# Patient Record
Sex: FEMALE | Race: OTHER | HISPANIC OR LATINO | Employment: STUDENT | URBAN - METROPOLITAN AREA
[De-identification: names, ages, dates, MRNs, and addresses within clinical notes are randomized per-mention and may not be internally consistent; named-entity substitution may affect disease eponyms.]

---
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
## 2020-01-02 ENCOUNTER — TELEPHONE (OUTPATIENT)
Dept: PSYCHIATRY | Facility: CLINIC | Age: 20
End: 2020-01-02

## 2020-01-02 NOTE — TELEPHONE ENCOUNTER
Behavorial Health Outpatient Intake Questions    Referred by: PCP    Please advised interviewee that they need to answer all questions truthfully to allow for best care and any misrepresentations of information may affect their ability to be seen at this clinic   => Was this discussed? Yes     Behavorial Health Outpatient Intake History -     Presenting Problem (in patient's words): Depression/anixety, med management    Has the patient ever seen or is currently seeing a psychiatrist? No   If yes who/when? If seen as outpatient, have they been seen here (and by whom)? If not seen here, which provider(s) did the patient see and for how long? Has the patient ever seen or currently see a therapist? Yes If yes who/when? At school  Has a member of the patient's family been in therapy here? No  If yes, with whom? Has the patient been hospitalized for mental health? No   If yes, how long ago was last hospitalization and where was it? Substance Abuse:No concerns of substance abuse are reported  Does the patient have ICM or CTT? No    Is the patient taking injectable psychiatric medications? No    => If yes, patient cannot be seen here  Communications  Are there any developmental disabilities? No    Does the patient have hearing impairment? No       History-    Has the patient served in the [place] [address]? No    If yes, have you had combat services? No    Was the patient activated into federal active duty as a member of the [employer] or reserve? No    Legal History-     Does the patient have any history of arrests, FPC/half-way time, or DUIs? No  If Yes-  1) What types of charges? 2) When were they last incarcerated? 3) Are they currently on parole or probation? Minor Child-    Who has custody of the child? Is there a custody agreement? If there is a custody agreement remind parent that they must bring a copy to the first appt or they will not be seen       Intake Team, please check with provider before scheduling if flags come up such as:  - complex case  - legal history (other than DUI)  - communication barrier concerns are present  - if, in your judgment, this needs further review    ACCEPTED as a patient Yes  => Appointment Date: Tuesday, 2/18/20 @ 3pm w/ Tim Sit    Referred Elsewhere? No    Name of Insurance Co: [address] [place] [address] Yu Rong ID#: [id number]  Insurance Phone #  If ins is primary or secondary  If patient is a minor, parents information such as Name, D  O B of guarantor

## 2020-01-07 ENCOUNTER — TELEPHONE (OUTPATIENT)
Dept: OBGYN CLINIC | Facility: CLINIC | Age: 20
End: 2020-01-07

## 2020-01-07 ENCOUNTER — OFFICE VISIT (OUTPATIENT)
Dept: OBGYN CLINIC | Facility: CLINIC | Age: 20
End: 2020-01-07
Payer: COMMERCIAL

## 2020-01-07 ENCOUNTER — APPOINTMENT (OUTPATIENT)
Dept: LAB | Facility: HOSPITAL | Age: 20
End: 2020-01-07
Payer: COMMERCIAL

## 2020-01-07 VITALS
DIASTOLIC BLOOD PRESSURE: 80 MMHG | SYSTOLIC BLOOD PRESSURE: 126 MMHG | BODY MASS INDEX: 51.91 KG/M2 | HEIGHT: 63 IN | WEIGHT: 293 LBS

## 2020-01-07 DIAGNOSIS — D50.0 IRON DEFICIENCY ANEMIA DUE TO CHRONIC BLOOD LOSS: ICD-10-CM

## 2020-01-07 DIAGNOSIS — N93.9 ABNORMAL UTERINE BLEEDING: Primary | ICD-10-CM

## 2020-01-07 DIAGNOSIS — N88.2 CERVICAL STENOSIS (UTERINE CERVIX): ICD-10-CM

## 2020-01-07 LAB
BASOPHILS # BLD AUTO: 0.02 THOUSANDS/ΜL (ref 0–0.1)
BASOPHILS NFR BLD AUTO: 0 % (ref 0–1)
EOSINOPHIL # BLD AUTO: 0.16 THOUSAND/ΜL (ref 0–0.61)
EOSINOPHIL NFR BLD AUTO: 2 % (ref 0–6)
ERYTHROCYTE [DISTWIDTH] IN BLOOD BY AUTOMATED COUNT: 17.7 % (ref 11.6–15.1)
HCT VFR BLD AUTO: 32.6 % (ref 34.8–46.1)
HGB BLD-MCNC: 10.2 G/DL (ref 11.5–15.4)
IMM GRANULOCYTES # BLD AUTO: 0.04 THOUSAND/UL (ref 0–0.2)
IMM GRANULOCYTES NFR BLD AUTO: 1 % (ref 0–2)
LYMPHOCYTES # BLD AUTO: 3.12 THOUSANDS/ΜL (ref 0.6–4.47)
LYMPHOCYTES NFR BLD AUTO: 37 % (ref 14–44)
MCH RBC QN AUTO: 25.8 PG (ref 26.8–34.3)
MCHC RBC AUTO-ENTMCNC: 31.3 G/DL (ref 31.4–37.4)
MCV RBC AUTO: 82 FL (ref 82–98)
MONOCYTES # BLD AUTO: 0.37 THOUSAND/ΜL (ref 0.17–1.22)
MONOCYTES NFR BLD AUTO: 4 % (ref 4–12)
NEUTROPHILS # BLD AUTO: 4.64 THOUSANDS/ΜL (ref 1.85–7.62)
NEUTS SEG NFR BLD AUTO: 56 % (ref 43–75)
NRBC BLD AUTO-RTO: 0 /100 WBCS
PLATELET # BLD AUTO: 293 THOUSANDS/UL (ref 149–390)
PMV BLD AUTO: 12 FL (ref 8.9–12.7)
RBC # BLD AUTO: 3.96 MILLION/UL (ref 3.81–5.12)
WBC # BLD AUTO: 8.35 THOUSAND/UL (ref 4.31–10.16)

## 2020-01-07 PROCEDURE — 85025 COMPLETE CBC W/AUTO DIFF WBC: CPT | Performed by: NURSE PRACTITIONER

## 2020-01-07 PROCEDURE — 99213 OFFICE O/P EST LOW 20 MIN: CPT | Performed by: NURSE PRACTITIONER

## 2020-01-07 PROCEDURE — 36415 COLL VENOUS BLD VENIPUNCTURE: CPT | Performed by: NURSE PRACTITIONER

## 2020-01-07 RX ORDER — MISOPROSTOL 200 UG/1
600 TABLET ORAL ONCE
Qty: 3 TABLET | Refills: 0 | Status: SHIPPED | OUTPATIENT
Start: 2020-01-07 | End: 2020-02-04 | Stop reason: ALTCHOICE

## 2020-01-07 RX ORDER — CHOLECALCIFEROL (VITAMIN D3) 50 MCG
4000 TABLET ORAL DAILY
COMMUNITY
Start: 2019-12-11 | End: 2020-02-18 | Stop reason: SDUPTHER

## 2020-01-07 NOTE — PROGRESS NOTES
Marcy Avitia 23year-old here for follow-up of her irregular bleeding  She was last seen in August 2019 at which time she was started on a progestin only pill  She was to follow up in 2 months and did not return  She has history of anemia due to blood loss  Last CBC was on 11/5/19 which was 12 5  She has been receiving iron transfusions, last infusion was in October  She discontinued the pill in September, states it was making her bleeding worse  Once she stopped the pill the bleeding stopped 2-3 days after  She started bleeding again on November 30th and has been ongoing since then  Heavy daily, changing every 2 hours  States for the past 4 days the bleeding has lightened, she is changing every 3 hours  She does complain of SOB  Denies dizziness  Patient has not been compliant with plan of care or follow-up visits  ROS:  As indicated in HPI  All other ROS negative  Physical Exam   Constitutional: She is oriented to person, place, and time  Vital signs are normal  She appears well-developed and well-nourished  HENT:   Head: Normocephalic and atraumatic  Neck: Neck supple  Cardiovascular: Normal rate and regular rhythm  Pulmonary/Chest: Effort normal and breath sounds normal    Neurological: She is alert and oriented to person, place, and time  Skin: Skin is warm  Nursing note and vitals reviewed  Iraida Villasenor was seen today for follow-up and menstrual problem  Diagnoses and all orders for this visit:    Abnormal uterine bleeding    Iron deficiency anemia due to chronic blood loss  -     CBC and differential    Cervical stenosis (uterine cervix)  -     misoprostol (CYTOTEC) 200 mcg tablet; Take 3 tablets (600 mcg total) by mouth once for 1 dose Take night before procedure      I once again reviewed options with patient for control of her menorrhagia to include Depo-provera or Mirena IUD  She has opted to try the Mirena IUD  Benefits and risk reviewed  Procedure reviewed   She will need Cytotec for cervical ripening  Cytotec prescribed for cervical ripening, to be take the night before insertion  She was also instructed to take 600 mg of Ibuprofen the night before and morning of insertion  As well as to ensure that she eats prior to her procedure  Will obtain a repeat CBC  She will return on Thursday for IUD insertion

## 2020-01-09 ENCOUNTER — PROCEDURE VISIT (OUTPATIENT)
Dept: OBGYN CLINIC | Facility: CLINIC | Age: 20
End: 2020-01-09
Payer: COMMERCIAL

## 2020-01-09 VITALS
WEIGHT: 293 LBS | HEIGHT: 63 IN | SYSTOLIC BLOOD PRESSURE: 126 MMHG | BODY MASS INDEX: 51.91 KG/M2 | DIASTOLIC BLOOD PRESSURE: 82 MMHG

## 2020-01-09 DIAGNOSIS — Z30.430 ENCOUNTER FOR INSERTION OF MIRENA IUD: Primary | ICD-10-CM

## 2020-01-09 DIAGNOSIS — N93.9 ABNORMAL UTERINE BLEEDING: ICD-10-CM

## 2020-01-09 PROCEDURE — 3008F BODY MASS INDEX DOCD: CPT | Performed by: OBSTETRICS & GYNECOLOGY

## 2020-01-09 PROCEDURE — 58300 INSERT INTRAUTERINE DEVICE: CPT | Performed by: NURSE PRACTITIONER

## 2020-01-09 NOTE — PROGRESS NOTES
Katelin Ocampo 23year-old here today for Mirena IUD insertion for abnormal uterine bleeding  Risk and benefits reviewed  Iud insertions  Date/Time: 1/9/2020 1:48 PM  Performed by: KELLY Lombardo  Authorized by: KELLY Lombardo     Consent:     Consent obtained:  Verbal and written    Consent given by:  Patient    Procedure risks and benefits discussed: yes      Patient questions answered: yes      Patient agrees, verbalizes understanding, and wants to proceed: yes      Educational handouts given: yes      Instructions and paperwork completed: yes    Procedure:     Pelvic exam performed: yes      Negative GC/chlamydia test: yes      Negative urine pregnancy test: currently on menses  Cervix cleaned and prepped: yes      Speculum placed in vagina: yes      Tenaculum applied to cervix: yes      Uterus sounded: yes      Uterus sound depth (cm):  7    IUD inserted with no complications: yes      IUD type:  Mirena    Strings trimmed: yes    Post-procedure:     Patient tolerated procedure well: yes      Patient will follow up after next period: yes    Comments:      IUD inserted without difficulty  Transabdominal US shows proper placement and no evidence of perforation  Patient given instruction booklet  She is to return in 5 weeks for follow up  Brenden Lopez was seen today for procedure      Diagnoses and all orders for this visit:    Encounter for insertion of mirena IUD  -     levonorgestrel (MIRENA) IUD 20 mcg/day  -     Iud insertions    Abnormal uterine bleeding  -     Iud insertions

## 2020-01-20 ENCOUNTER — TELEPHONE (OUTPATIENT)
Dept: HEMATOLOGY ONCOLOGY | Facility: CLINIC | Age: 20
End: 2020-01-20

## 2020-01-20 DIAGNOSIS — D50.0 IRON DEFICIENCY ANEMIA DUE TO CHRONIC BLOOD LOSS: Primary | ICD-10-CM

## 2020-01-20 NOTE — TELEPHONE ENCOUNTER
JONGM informing patient that her iron panel lab test needs to be completed prior to her appt  Instructed patient call the office if she has any questions

## 2020-01-21 ENCOUNTER — TELEPHONE (OUTPATIENT)
Dept: HEMATOLOGY ONCOLOGY | Facility: CLINIC | Age: 20
End: 2020-01-21

## 2020-01-27 ENCOUNTER — TELEPHONE (OUTPATIENT)
Dept: ENDOCRINOLOGY | Facility: CLINIC | Age: 20
End: 2020-01-27

## 2020-01-28 NOTE — TELEPHONE ENCOUNTER
Received fax from [phone] with referral for patients upcoming visit  Referral # [id number]  Referral dates: 2-6-2020 to 5-6-2020  Good for 3 visits    Scanned/attached to chart

## 2020-01-31 ENCOUNTER — TELEPHONE (OUTPATIENT)
Dept: HEMATOLOGY ONCOLOGY | Facility: CLINIC | Age: 20
End: 2020-01-31

## 2020-01-31 NOTE — TELEPHONE ENCOUNTER
Spoke to patient and informed her that her labs need to be completed prior to her appt  Patient voiced understanding

## 2020-02-02 ENCOUNTER — APPOINTMENT (OUTPATIENT)
Dept: LAB | Facility: HOSPITAL | Age: 20
End: 2020-02-02
Payer: COMMERCIAL

## 2020-02-02 DIAGNOSIS — R79.89 ELEVATED LFTS: ICD-10-CM

## 2020-02-02 DIAGNOSIS — E78.1 HYPERTRIGLYCERIDEMIA: ICD-10-CM

## 2020-02-02 DIAGNOSIS — E55.9 VITAMIN D DEFICIENCY: ICD-10-CM

## 2020-02-02 DIAGNOSIS — D50.0 IRON DEFICIENCY ANEMIA DUE TO CHRONIC BLOOD LOSS: ICD-10-CM

## 2020-02-02 DIAGNOSIS — R73.03 PRE-DIABETES: ICD-10-CM

## 2020-02-02 LAB
25(OH)D3 SERPL-MCNC: 26.5 NG/ML (ref 30–100)
ALBUMIN SERPL BCP-MCNC: 3.7 G/DL (ref 3.5–5)
ALP SERPL-CCNC: 84 U/L (ref 46–116)
ALT SERPL W P-5'-P-CCNC: 154 U/L (ref 12–78)
ANION GAP SERPL CALCULATED.3IONS-SCNC: 8 MMOL/L (ref 4–13)
AST SERPL W P-5'-P-CCNC: 101 U/L (ref 5–45)
BILIRUB SERPL-MCNC: 0.4 MG/DL (ref 0.2–1)
BUN SERPL-MCNC: 8 MG/DL (ref 5–25)
CALCIUM SERPL-MCNC: 9.1 MG/DL (ref 8.3–10.1)
CHLORIDE SERPL-SCNC: 109 MMOL/L (ref 100–108)
CHOLEST SERPL-MCNC: 174 MG/DL (ref 50–200)
CO2 SERPL-SCNC: 24 MMOL/L (ref 21–32)
CREAT SERPL-MCNC: 0.6 MG/DL (ref 0.6–1.3)
ERYTHROCYTE [DISTWIDTH] IN BLOOD BY AUTOMATED COUNT: 15.7 % (ref 11.6–15.1)
EST. AVERAGE GLUCOSE BLD GHB EST-MCNC: 148 MG/DL
FERRITIN SERPL-MCNC: 24 NG/ML (ref 8–388)
GFR SERPL CREATININE-BSD FRML MDRD: 132 ML/MIN/1.73SQ M
GLUCOSE P FAST SERPL-MCNC: 114 MG/DL (ref 65–99)
HAV IGM SER QL: NORMAL
HBA1C MFR BLD: 6.8 % (ref 4.2–6.3)
HBV CORE IGM SER QL: NORMAL
HBV SURFACE AG SER QL: NORMAL
HCT VFR BLD AUTO: 32.9 % (ref 34.8–46.1)
HCV AB SER QL: NORMAL
HDLC SERPL-MCNC: 28 MG/DL
HGB BLD-MCNC: 10 G/DL (ref 11.5–15.4)
IRON SATN MFR SERPL: 5 %
IRON SERPL-MCNC: 27 UG/DL (ref 50–170)
LDLC SERPL CALC-MCNC: 103 MG/DL (ref 0–100)
MCH RBC QN AUTO: 25.4 PG (ref 26.8–34.3)
MCHC RBC AUTO-ENTMCNC: 30.4 G/DL (ref 31.4–37.4)
MCV RBC AUTO: 84 FL (ref 82–98)
PLATELET # BLD AUTO: 349 THOUSANDS/UL (ref 149–390)
PMV BLD AUTO: 11.7 FL (ref 8.9–12.7)
POTASSIUM SERPL-SCNC: 3.7 MMOL/L (ref 3.5–5.3)
PROT SERPL-MCNC: 7.9 G/DL (ref 6.4–8.2)
RBC # BLD AUTO: 3.93 MILLION/UL (ref 3.81–5.12)
SODIUM SERPL-SCNC: 141 MMOL/L (ref 136–145)
TIBC SERPL-MCNC: 509 UG/DL (ref 250–450)
TRIGL SERPL-MCNC: 217 MG/DL
WBC # BLD AUTO: 7.71 THOUSAND/UL (ref 4.31–10.16)

## 2020-02-02 PROCEDURE — 3044F HG A1C LEVEL LT 7.0%: CPT | Performed by: OBSTETRICS & GYNECOLOGY

## 2020-02-02 PROCEDURE — 80074 ACUTE HEPATITIS PANEL: CPT

## 2020-02-02 PROCEDURE — 82306 VITAMIN D 25 HYDROXY: CPT

## 2020-02-02 PROCEDURE — 82728 ASSAY OF FERRITIN: CPT

## 2020-02-02 PROCEDURE — 80061 LIPID PANEL: CPT

## 2020-02-02 PROCEDURE — 83036 HEMOGLOBIN GLYCOSYLATED A1C: CPT

## 2020-02-02 PROCEDURE — 85027 COMPLETE CBC AUTOMATED: CPT

## 2020-02-02 PROCEDURE — 80053 COMPREHEN METABOLIC PANEL: CPT

## 2020-02-02 PROCEDURE — 83550 IRON BINDING TEST: CPT

## 2020-02-02 PROCEDURE — 36415 COLL VENOUS BLD VENIPUNCTURE: CPT

## 2020-02-02 PROCEDURE — 83540 ASSAY OF IRON: CPT

## 2020-02-03 ENCOUNTER — OFFICE VISIT (OUTPATIENT)
Dept: OBGYN CLINIC | Facility: CLINIC | Age: 20
End: 2020-02-03
Payer: COMMERCIAL

## 2020-02-03 ENCOUNTER — OFFICE VISIT (OUTPATIENT)
Dept: HEMATOLOGY ONCOLOGY | Facility: CLINIC | Age: 20
End: 2020-02-03
Payer: COMMERCIAL

## 2020-02-03 VITALS
DIASTOLIC BLOOD PRESSURE: 76 MMHG | WEIGHT: 293 LBS | BODY MASS INDEX: 51.91 KG/M2 | SYSTOLIC BLOOD PRESSURE: 124 MMHG | HEIGHT: 63 IN

## 2020-02-03 VITALS
HEIGHT: 63 IN | OXYGEN SATURATION: 99 % | SYSTOLIC BLOOD PRESSURE: 128 MMHG | RESPIRATION RATE: 16 BRPM | TEMPERATURE: 98.7 F | HEART RATE: 90 BPM | WEIGHT: 293 LBS | DIASTOLIC BLOOD PRESSURE: 72 MMHG | BODY MASS INDEX: 51.91 KG/M2

## 2020-02-03 DIAGNOSIS — D50.0 IRON DEFICIENCY ANEMIA DUE TO CHRONIC BLOOD LOSS: Primary | ICD-10-CM

## 2020-02-03 DIAGNOSIS — N93.9 ABNORMAL UTERINE BLEEDING: ICD-10-CM

## 2020-02-03 DIAGNOSIS — N92.1 MENORRHAGIA WITH IRREGULAR CYCLE: Primary | ICD-10-CM

## 2020-02-03 PROCEDURE — 1036F TOBACCO NON-USER: CPT | Performed by: NURSE PRACTITIONER

## 2020-02-03 PROCEDURE — 3008F BODY MASS INDEX DOCD: CPT | Performed by: NURSE PRACTITIONER

## 2020-02-03 PROCEDURE — 3008F BODY MASS INDEX DOCD: CPT | Performed by: PHYSICIAN ASSISTANT

## 2020-02-03 PROCEDURE — 1036F TOBACCO NON-USER: CPT | Performed by: PHYSICIAN ASSISTANT

## 2020-02-03 PROCEDURE — 99213 OFFICE O/P EST LOW 20 MIN: CPT | Performed by: NURSE PRACTITIONER

## 2020-02-03 PROCEDURE — 99213 OFFICE O/P EST LOW 20 MIN: CPT | Performed by: PHYSICIAN ASSISTANT

## 2020-02-03 RX ORDER — SODIUM CHLORIDE 9 MG/ML
20 INJECTION, SOLUTION INTRAVENOUS ONCE
Status: CANCELLED | OUTPATIENT
Start: 2020-02-13

## 2020-02-03 RX ORDER — MEDROXYPROGESTERONE ACETATE 10 MG/1
10 TABLET ORAL DAILY
Qty: 10 TABLET | Refills: 0 | Status: SHIPPED | OUTPATIENT
Start: 2020-02-03 | End: 2021-11-12 | Stop reason: ALTCHOICE

## 2020-02-03 NOTE — PROGRESS NOTES
Re Pierre 21year-old history of AUB  On 1/9/2020 a mirena IUD was inserted for bleeding control  She presents today with IUD in hand and states it fell out a few days ago  She is still bleeding, but states it is lighter than it has been  She has been bleeding since 11/30/19  She was seen by hematology today and will be having iron infusions starting next week  She denies SOB/Dizziness  We discussed replacement of the IUD after we stop the bleeding with provera  She will think about it  She was also offered to start on OCP or Depo-provera  She is working on loosing weight and is not interested in the Depo-provera and states the OCP does not help with her bleeding  We did speak about how weight loss will be beneficial in helping with her cycles  She will call the office when she decides which hormone she decides on  For now she will take the Provera daily for 10 days and update us on her bleeding  I spent approximately 20 minutes of face-to-face time with the  patient, greater than 50% of which was spent in counseling and the coordination of care for this patient  Maggie Hinkel was seen today for medication management  Diagnoses and all orders for this visit:    Menorrhagia with irregular cycle  -     medroxyPROGESTERone (PROVERA) 10 mg tablet; Take 1 tablet (10 mg total) by mouth daily for 10 days    Abnormal uterine bleeding  -     medroxyPROGESTERone (PROVERA) 10 mg tablet;  Take 1 tablet (10 mg total) by mouth daily for 10 days

## 2020-02-03 NOTE — LETTER
February 3, 2020     Jason Narvaez, [address] [place] [address]    Patient: Naya Blair   YOB: 2000   Date of Visit: 2/3/2020       Dear Dr Óscar Delcid: Thank you for referring Naya Blair to me for evaluation  Below are my notes for this consultation  If you have questions, please do not hesitate to call me  I look forward to following your patient along with you  Sincerely,        Sisto Siemens, PA-C        CC: No Recipients  Sisto Siemens, PA-C  2/3/2020 12:52 PM  Sign at close encounter  Hematology/Oncology Outpatient Follow-up  Naya Blair 21 y o  female 2000 [id number]    Date:  2/3/2020      Assessment and Plan:  1  Iron deficiency anemia due to chronic blood loss  55-year-old female presents for follow-up regarding iron-deficiency anemia related to chronic blood loss secondary to menorrhagia  She had an IUD placed by gyn recently and states that this fell out a few days ago  She is advised to call her gyn to discuss this  She states that she has continuous bleeding still  She is taking 2 oral iron tablets daily  Her hemoglobin and iron have decreased  She is now bleeding daily from menstrual period still  She would like to proceed with IV iron, Feraheme 510 IV weekly x 2  Follow up in 3 months  Call us if have progressive symptoms in the meantime  Advise to follow up with GYN asap due to IUD coming out  - Ambulatory referral to Gynecology; Future  - CBC and differential; Future  - Iron Panel (Includes Ferritin, Iron Sat%, Iron, and TIBC); Future    HPI:  55-year-old female presents for follow-up regarding iron deficiency related to menorrhagia  She did have workup for bleeding disorder which was negative  She received IV iron, Feraheme  Hemoglobin improved to 12 5  She has now been in observation  She continues to follow closely with GYN   She states she had IUD placed recently and this fell out a few days ago  Interval history:    ROS: Review of Systems   Constitutional: Positive for fatigue  Negative for activity change, appetite change, chills, fever and unexpected weight change  HENT: Negative for nosebleeds  Respiratory: Negative for cough and shortness of breath  Cardiovascular: Negative for chest pain, palpitations and leg swelling  Gastrointestinal: Negative for abdominal pain, constipation, diarrhea, nausea and vomiting  Genitourinary: Positive for menstrual problem (heavy periods)  Negative for difficulty urinating and dysuria  Musculoskeletal: Negative for arthralgias  Skin: Negative  Neurological: Positive for headaches (she had headache with IUD every day)  Negative for dizziness, weakness, light-headedness and numbness  Hematological: Negative  Psychiatric/Behavioral: Negative        Past Medical History:   Diagnosis Date    Anemia     Anxiety     Asthma     childhood    Chronic headache     Depression     High cholesterol     Hyperlipidemia     Pre-diabetes     Scoliosis     Sleep apnea        Past Surgical History:   Procedure Laterality Date    BREAST CYST INCISION AND DRAINAGE Right 7/25/2017    Procedure: INCISION AND DRAINAGE (I&D) BREAST;  Surgeon: Antony Aldana DO;  Location: [facility];  Service: General    BREAST SURGERY  2017    TONSILLECTOMY      TONSILLECTOMY  2016       Social History     Socioeconomic History    Marital status: Single     Spouse name: None    Number of children: None    Years of education: None    Highest education level: None   Occupational History    Occupation: unemployed   Social Needs    Financial resource strain: None    Food insecurity:     Worry: Never true     Inability: Never true    Transportation needs:     Medical: No     Non-medical: No   Tobacco Use    Smoking status: Never Smoker    Smokeless tobacco: Never Used   Substance and Sexual Activity    Alcohol use: No    Drug use: No    Sexual activity: Yes     Partners: Male   Lifestyle    Physical activity:     Days per week: 0 days     Minutes per session: 0 min    Stress: Not at all   Relationships    Social connections:     Talks on phone: Patient refused     Gets together: Patient refused     Attends [employer] service: Patient refused     Active member of club or organization: Patient refused     Attends meetings of clubs or organizations: Patient refused     Relationship status: Patient refused    Intimate partner violence:     Fear of current or ex partner: Patient refused     Emotionally abused: Patient refused     Physically abused: Patient refused     Forced sexual activity: Patient refused   Other Topics Concern    None   Social History Narrative    LIVES AT HOME WITH MOM AND DAD       Family History   Problem Relation Age of Onset    Hypertension Mother     Hyperlipidemia Mother     Diabetes Mother     Thyroid disease Sister     Anemia Sister     Obesity Family     Allergies Family     Diabetes Family     Appendicitis Maternal Grandmother     Aneurysm Paternal Grandfather     Anemia Sister     Anemia Sister     Prostate cancer Paternal Uncle     Substance Abuse Neg Hx     Mental illness Neg Hx     Stroke Neg Hx        No Known Allergies      Current Outpatient Medications:     albuterol (VENTOLIN HFA) 90 mcg/act inhaler, Inhale 2 puffs every 6 (six) hours as needed for wheezing, Disp: 18 g, Rfl: 0    Cholecalciferol (VITAMIN D) 50 MCG (2000 UT) tablet, Take 4,000 Units by mouth daily, Disp: , Rfl:     cyanocobalamin 1000 MCG tablet, Take 1 tablet (1,000 mcg total) by mouth daily, Disp: 90 tablet, Rfl: 0    Ergocalciferol (VITAMIN D2) 50 MCG (2000 UT) TABS, Take 2 tablets (4,000 Units total) by mouth daily, Disp: 90 tablet, Rfl: 1    ergocalciferol (VITAMIN D2) 50,000 units, Take 1 capsule (50,000 Units total) by mouth once a week, Disp: 12 capsule, Rfl: 0    fenofibrate (TRICOR) 145 mg tablet, Take 1 tablet (145 mg total) by mouth daily, Disp: 90 tablet, Rfl: 1    ferrous sulfate 325 (65 Fe) mg tablet, Take 1 tablet (325 mg total) by mouth 2 (two) times a day with meals, Disp: 180 tablet, Rfl: 1    guaiFENesin (MUCINEX) 600 mg 12 hr tablet, Take 1,200 mg by mouth every 12 (twelve) hours, Disp: , Rfl:     medroxyPROGESTERone (PROVERA) 10 mg tablet, Take 1 tablet (10 mg total) by mouth daily for 10 days, Disp: 10 tablet, Rfl: 0    metFORMIN (GLUCOPHAGE-XR) 500 mg 24 hr tablet, Take 1 tablet (500 mg total) by mouth 2 (two) times a day with meals, Disp: 180 tablet, Rfl: 1    misoprostol (CYTOTEC) 200 mcg tablet, Take 3 tablets (600 mcg total) by mouth once for 1 dose Take night before procedure, Disp: 3 tablet, Rfl: 0    norethindrone (MICRONOR) 0 35 MG tablet, Take 1 tablet (0 35 mg total) by mouth daily for 28 days, Disp: 28 tablet, Rfl: 1    nystatin (MYCOSTATIN) powder, Apply topically 3 (three) times a day, Disp: 60 g, Rfl: 1    omega-3-acid ethyl esters (LOVAZA) 1 g capsule, Take 2 capsules (2 g total) by mouth 2 (two) times a day, Disp: 360 capsule, Rfl: 1    pramipexole (MIRAPEX) 0 25 mg tablet, 1/2 to 3 tablets by mouth 1 hour before bedtime, Disp: 90 tablet, Rfl: 2      Physical Exam:  /72 (BP Location: Left arm, Patient Position: Sitting, Cuff Size: Adult)   Pulse 90   Temp 98 7 °F (37 1 °C)   Resp 16   Ht 5' 3" (1 6 m)   Wt (!) 155 kg (341 lb)   SpO2 99%   BMI 60 41 kg/m²      Physical Exam   Constitutional: She is oriented to person, place, and time  She appears well-developed and well-nourished  No distress  HENT:   Head: Normocephalic and atraumatic  Eyes: Conjunctivae are normal  No scleral icterus  Neck: Normal range of motion  Neck supple  Cardiovascular: Normal rate, regular rhythm and normal heart sounds  No murmur heard  Pulmonary/Chest: Effort normal and breath sounds normal  No respiratory distress  Abdominal: Soft  There is no tenderness     Musculoskeletal: Normal range of motion  She exhibits no edema or tenderness  Lymphadenopathy:     She has no cervical adenopathy  Neurological: She is alert and oriented to person, place, and time  No cranial nerve deficit  Skin: Skin is warm and dry  Psychiatric: She has a normal mood and affect  Vitals reviewed  Labs:  Lab Results   Component Value Date    WBC 7 71 02/02/2020    HGB 10 0 (L) 02/02/2020    HCT 32 9 (L) 02/02/2020    MCV 84 02/02/2020     02/02/2020     Lab Results   Component Value Date     05/02/2016    K 3 7 02/02/2020     (H) 02/02/2020    CO2 24 02/02/2020    ANIONGAP 17 09/18/2014    BUN 8 02/02/2020    CREATININE 0 60 02/02/2020    GLUF 114 (H) 02/02/2020    CALCIUM 9 1 02/02/2020     (H) 02/02/2020     (H) 02/02/2020    ALKPHOS 84 02/02/2020    PROT 7 1 05/02/2016    BILITOT 0 2 05/02/2016    EGFR 132 02/02/2020       Patient voiced understanding and agreement in the above discussion  Aware to contact our office with questions/symptoms in the interim  This note has been generated by voice recognition software system  Therefore, there may be spelling, grammar, and or syntax errors  Please contact if questions arise

## 2020-02-03 NOTE — PROGRESS NOTES
Hematology/Oncology Outpatient Follow-up  Katelin Ocampo 21 y o  female 2000    Date:  2/3/2020      Assessment and Plan:  1  Iron deficiency anemia due to chronic blood loss  28-year-old female presents for follow-up regarding iron-deficiency anemia related to chronic blood loss secondary to menorrhagia  She had an IUD placed by gyn recently and states that this fell out a few days ago  She is advised to call her gyn to discuss this  She states that she has continuous bleeding still  She is taking 2 oral iron tablets daily  Her hemoglobin and iron have decreased  She is now bleeding daily from menstrual period still  She would like to proceed with IV iron, Feraheme 510 IV weekly x 2  Follow up in 3 months  Call us if have progressive symptoms in the meantime  Advise to follow up with GYN asap due to IUD coming out  - Ambulatory referral to Gynecology; Future  - CBC and differential; Future  - Iron Panel (Includes Ferritin, Iron Sat%, Iron, and TIBC); Future    HPI:  28-year-old female presents for follow-up regarding iron deficiency related to menorrhagia  She did have workup for bleeding disorder which was negative  She received IV iron, Feraheme  Hemoglobin improved to 12 5  She has now been in observation  She continues to follow closely with GYN  She states she had IUD placed recently and this fell out a few days ago  Interval history:    ROS: Review of Systems   Constitutional: Positive for fatigue  Negative for activity change, appetite change, chills, fever and unexpected weight change  HENT: Negative for nosebleeds  Respiratory: Negative for cough and shortness of breath  Cardiovascular: Negative for chest pain, palpitations and leg swelling  Gastrointestinal: Negative for abdominal pain, constipation, diarrhea, nausea and vomiting  Genitourinary: Positive for menstrual problem (heavy periods)  Negative for difficulty urinating and dysuria  Musculoskeletal: Negative for arthralgias  Skin: Negative  Neurological: Positive for headaches (she had headache with IUD every day)  Negative for dizziness, weakness, light-headedness and numbness  Hematological: Negative  Psychiatric/Behavioral: Negative        Past Medical History:   Diagnosis Date    Anemia     Anxiety     Asthma     childhood    Chronic headache     Depression     High cholesterol     Hyperlipidemia     Pre-diabetes     Scoliosis     Sleep apnea        Past Surgical History:   Procedure Laterality Date    BREAST CYST INCISION AND DRAINAGE Right 7/25/2017    Procedure: INCISION AND DRAINAGE (I&D) BREAST;  Surgeon: Laila Quintana DO;  Location: [facility];  Service: General    BREAST SURGERY  2017    TONSILLECTOMY      TONSILLECTOMY  2016       Social History     Socioeconomic History    Marital status: Single     Spouse name: None    Number of children: None    Years of education: None    Highest education level: None   Occupational History    Occupation: unemployed   Social Needs    Financial resource strain: None    Food insecurity:     Worry: Never true     Inability: Never true    Transportation needs:     Medical: No     Non-medical: No   Tobacco Use    Smoking status: Never Smoker    Smokeless tobacco: Never Used   Substance and Sexual Activity    Alcohol use: No    Drug use: No    Sexual activity: Yes     Partners: Male   Lifestyle    Physical activity:     Days per week: 0 days     Minutes per session: 0 min    Stress: Not at all   Relationships    Social connections:     Talks on phone: Patient refused     Gets together: Patient refused     Attends Church service: Patient refused     Active member of club or organization: Patient refused     Attends meetings of clubs or organizations: Patient refused     Relationship status: Patient refused    Intimate partner violence:     Fear of current or ex partner: Patient refused     Emotionally abused: Patient refused     Physically abused: Patient refused     Forced sexual activity: Patient refused   Other Topics Concern    None   Social History Narrative    LIVES AT HOME WITH MOM AND DAD       Family History   Problem Relation Age of Onset    Hypertension Mother     Hyperlipidemia Mother     Diabetes Mother     Thyroid disease Sister     Anemia Sister     Obesity Family     Allergies Family     Diabetes Family     Appendicitis Maternal Grandmother     Aneurysm Paternal Grandfather     Anemia Sister     Anemia Sister     Prostate cancer Paternal Uncle     Substance Abuse Neg Hx     Mental illness Neg Hx     Stroke Neg Hx        No Known Allergies      Current Outpatient Medications:     albuterol (VENTOLIN HFA) 90 mcg/act inhaler, Inhale 2 puffs every 6 (six) hours as needed for wheezing, Disp: 18 g, Rfl: 0    Cholecalciferol (VITAMIN D) 50 MCG (2000 UT) tablet, Take 4,000 Units by mouth daily, Disp: , Rfl:     cyanocobalamin 1000 MCG tablet, Take 1 tablet (1,000 mcg total) by mouth daily, Disp: 90 tablet, Rfl: 0    Ergocalciferol (VITAMIN D2) 50 MCG (2000 UT) TABS, Take 2 tablets (4,000 Units total) by mouth daily, Disp: 90 tablet, Rfl: 1    ergocalciferol (VITAMIN D2) 50,000 units, Take 1 capsule (50,000 Units total) by mouth once a week, Disp: 12 capsule, Rfl: 0    fenofibrate (TRICOR) 145 mg tablet, Take 1 tablet (145 mg total) by mouth daily, Disp: 90 tablet, Rfl: 1    ferrous sulfate 325 (65 Fe) mg tablet, Take 1 tablet (325 mg total) by mouth 2 (two) times a day with meals, Disp: 180 tablet, Rfl: 1    guaiFENesin (MUCINEX) 600 mg 12 hr tablet, Take 1,200 mg by mouth every 12 (twelve) hours, Disp: , Rfl:     medroxyPROGESTERone (PROVERA) 10 mg tablet, Take 1 tablet (10 mg total) by mouth daily for 10 days, Disp: 10 tablet, Rfl: 0    metFORMIN (GLUCOPHAGE-XR) 500 mg 24 hr tablet, Take 1 tablet (500 mg total) by mouth 2 (two) times a day with meals, Disp: 180 tablet, Rfl: 1    misoprostol (CYTOTEC) 200 mcg tablet, Take 3 tablets (600 mcg total) by mouth once for 1 dose Take night before procedure, Disp: 3 tablet, Rfl: 0    norethindrone (MICRONOR) 0 35 MG tablet, Take 1 tablet (0 35 mg total) by mouth daily for 28 days, Disp: 28 tablet, Rfl: 1    nystatin (MYCOSTATIN) powder, Apply topically 3 (three) times a day, Disp: 60 g, Rfl: 1    omega-3-acid ethyl esters (LOVAZA) 1 g capsule, Take 2 capsules (2 g total) by mouth 2 (two) times a day, Disp: 360 capsule, Rfl: 1    pramipexole (MIRAPEX) 0 25 mg tablet, 1/2 to 3 tablets by mouth 1 hour before bedtime, Disp: 90 tablet, Rfl: 2      Physical Exam:  /72 (BP Location: Left arm, Patient Position: Sitting, Cuff Size: Adult)   Pulse 90   Temp 98 7 °F (37 1 °C)   Resp 16   Ht 5' 3" (1 6 m)   Wt (!) 155 kg (341 lb)   SpO2 99%   BMI 60 41 kg/m²     Physical Exam   Constitutional: She is oriented to person, place, and time  She appears well-developed and well-nourished  No distress  HENT:   Head: Normocephalic and atraumatic  Eyes: Conjunctivae are normal  No scleral icterus  Neck: Normal range of motion  Neck supple  Cardiovascular: Normal rate, regular rhythm and normal heart sounds  No murmur heard  Pulmonary/Chest: Effort normal and breath sounds normal  No respiratory distress  Abdominal: Soft  There is no tenderness  Musculoskeletal: Normal range of motion  She exhibits no edema or tenderness  Lymphadenopathy:     She has no cervical adenopathy  Neurological: She is alert and oriented to person, place, and time  No cranial nerve deficit  Skin: Skin is warm and dry  Psychiatric: She has a normal mood and affect  Vitals reviewed      Labs:  Lab Results   Component Value Date    WBC 7 71 02/02/2020    HGB 10 0 (L) 02/02/2020    HCT 32 9 (L) 02/02/2020    MCV 84 02/02/2020     02/02/2020     Lab Results   Component Value Date     05/02/2016    K 3 7 02/02/2020     (H) 02/02/2020 CO2 24 02/02/2020    ANIONGAP 17 09/18/2014    BUN 8 02/02/2020    CREATININE 0 60 02/02/2020    GLUF 114 (H) 02/02/2020    CALCIUM 9 1 02/02/2020     (H) 02/02/2020     (H) 02/02/2020    ALKPHOS 84 02/02/2020    PROT 7 1 05/02/2016    BILITOT 0 2 05/02/2016    EGFR 132 02/02/2020       Patient voiced understanding and agreement in the above discussion  Aware to contact our office with questions/symptoms in the interim  This note has been generated by voice recognition software system  Therefore, there may be spelling, grammar, and or syntax errors  Please contact if questions arise

## 2020-02-04 ENCOUNTER — OFFICE VISIT (OUTPATIENT)
Dept: FAMILY MEDICINE CLINIC | Facility: CLINIC | Age: 20
End: 2020-02-04
Payer: COMMERCIAL

## 2020-02-04 VITALS
HEIGHT: 63 IN | RESPIRATION RATE: 17 BRPM | WEIGHT: 293 LBS | SYSTOLIC BLOOD PRESSURE: 130 MMHG | DIASTOLIC BLOOD PRESSURE: 76 MMHG | TEMPERATURE: 97.5 F | HEART RATE: 87 BPM | BODY MASS INDEX: 51.91 KG/M2 | OXYGEN SATURATION: 99 %

## 2020-02-04 DIAGNOSIS — E11.9 TYPE 2 DIABETES MELLITUS WITHOUT COMPLICATION, WITHOUT LONG-TERM CURRENT USE OF INSULIN (HCC): ICD-10-CM

## 2020-02-04 DIAGNOSIS — Z23 NEED FOR STREPTOCOCCUS PNEUMONIAE VACCINATION: ICD-10-CM

## 2020-02-04 DIAGNOSIS — D50.0 IRON DEFICIENCY ANEMIA DUE TO CHRONIC BLOOD LOSS: ICD-10-CM

## 2020-02-04 DIAGNOSIS — E78.5 HYPERLIPIDEMIA LDL GOAL <70: ICD-10-CM

## 2020-02-04 DIAGNOSIS — F32.A DEPRESSION, UNSPECIFIED DEPRESSION TYPE: ICD-10-CM

## 2020-02-04 DIAGNOSIS — E78.1 HYPERTRIGLYCERIDEMIA: ICD-10-CM

## 2020-02-04 DIAGNOSIS — E53.8 LOW VITAMIN B12 LEVEL: ICD-10-CM

## 2020-02-04 DIAGNOSIS — E55.9 VITAMIN D DEFICIENCY: Primary | ICD-10-CM

## 2020-02-04 DIAGNOSIS — E66.01 MORBID OBESITY (HCC): ICD-10-CM

## 2020-02-04 PROCEDURE — 3008F BODY MASS INDEX DOCD: CPT | Performed by: FAMILY MEDICINE

## 2020-02-04 PROCEDURE — 90471 IMMUNIZATION ADMIN: CPT

## 2020-02-04 PROCEDURE — 3044F HG A1C LEVEL LT 7.0%: CPT | Performed by: FAMILY MEDICINE

## 2020-02-04 PROCEDURE — 90732 PPSV23 VACC 2 YRS+ SUBQ/IM: CPT

## 2020-02-04 PROCEDURE — 99214 OFFICE O/P EST MOD 30 MIN: CPT | Performed by: FAMILY MEDICINE

## 2020-02-04 PROCEDURE — 1036F TOBACCO NON-USER: CPT | Performed by: FAMILY MEDICINE

## 2020-02-04 RX ORDER — ERGOCALCIFEROL 1.25 MG/1
50000 CAPSULE ORAL WEEKLY
Qty: 12 CAPSULE | Refills: 0 | Status: SHIPPED | OUTPATIENT
Start: 2020-02-04 | End: 2020-06-18 | Stop reason: ALTCHOICE

## 2020-02-04 RX ORDER — ATORVASTATIN CALCIUM 20 MG/1
20 TABLET, FILM COATED ORAL DAILY
Qty: 90 TABLET | Refills: 1 | Status: SHIPPED | OUTPATIENT
Start: 2020-02-04 | End: 2020-02-05 | Stop reason: CLARIF

## 2020-02-04 RX ORDER — METFORMIN HYDROCHLORIDE EXTENDED-RELEASE TABLETS 1000 MG/1
1000 TABLET, FILM COATED, EXTENDED RELEASE ORAL
Qty: 90 TABLET | Refills: 1 | Status: SHIPPED | OUTPATIENT
Start: 2020-02-04 | End: 2020-06-18 | Stop reason: ALTCHOICE

## 2020-02-04 RX ORDER — FENOFIBRATE 145 MG/1
145 TABLET, COATED ORAL DAILY
Qty: 90 TABLET | Refills: 1 | Status: SHIPPED | OUTPATIENT
Start: 2020-02-04 | End: 2021-03-30 | Stop reason: SDUPTHER

## 2020-02-04 RX ORDER — FERROUS SULFATE 325(65) MG
325 TABLET ORAL 2 TIMES DAILY WITH MEALS
Qty: 180 TABLET | Refills: 1 | Status: SHIPPED | OUTPATIENT
Start: 2020-02-04 | End: 2021-12-01

## 2020-02-04 RX ORDER — ROSUVASTATIN CALCIUM 10 MG/1
TABLET, COATED ORAL
Refills: 0 | OUTPATIENT
Start: 2020-02-04

## 2020-02-04 RX ORDER — ATORVASTATIN CALCIUM 20 MG/1
20 TABLET, FILM COATED ORAL DAILY
Qty: 90 TABLET | Refills: 1 | Status: SHIPPED | OUTPATIENT
Start: 2020-02-04 | End: 2021-04-20

## 2020-02-04 RX ORDER — METFORMIN HYDROCHLORIDE 1000 MG/1
1000 TABLET, FILM COATED, EXTENDED RELEASE ORAL
Qty: 180 TABLET | Refills: 1 | Status: SHIPPED | OUTPATIENT
Start: 2020-02-04 | End: 2020-02-04

## 2020-02-04 RX ORDER — CHOLECALCIFEROL (VITAMIN D3) 125 MCG
4000 TABLET ORAL DAILY
Qty: 90 TABLET | Refills: 1 | Status: SHIPPED | OUTPATIENT
Start: 2020-02-04 | End: 2020-06-18 | Stop reason: ALTCHOICE

## 2020-02-04 RX ORDER — OMEGA-3-ACID ETHYL ESTERS 1 G/1
2 CAPSULE, LIQUID FILLED ORAL 2 TIMES DAILY
Qty: 360 CAPSULE | Refills: 1 | Status: SHIPPED | OUTPATIENT
Start: 2020-02-04 | End: 2021-03-30 | Stop reason: SDUPTHER

## 2020-02-04 RX ORDER — MULTIVIT WITH MINERALS/LUTEIN
250 TABLET ORAL DAILY
Qty: 180 TABLET | Refills: 1 | Status: SHIPPED | OUTPATIENT
Start: 2020-02-04 | End: 2021-11-12 | Stop reason: ALTCHOICE

## 2020-02-04 NOTE — ASSESSMENT & PLAN NOTE
Vitamin D improved from 16 5 to 26 5  Will continue ergocalciferol 50,000 IU weekly and ergocalciferol 4,000 IU daily  Will reassess with lab and follow-up in 3 months

## 2020-02-04 NOTE — ASSESSMENT & PLAN NOTE
With diagnosis of diabetes, patient's LDL goal is now < 70  Will start atorvastatin 20 mg daily  Will order labs at the next visit for follow-up

## 2020-02-04 NOTE — PROGRESS NOTES
Assessment/Plan:    Type 2 diabetes mellitus without complication, without long-term current use of insulin (HCC)    Lab Results   Component Value Date    HGBA1C 6 8 (H) 02/02/2020     Patient's HgbA1c increased from 6 5% to 6 8%  Now diagnosed with diabetes  Will treat with metformin ER 1,000 mg daily  Continue to work on diet modification  Will order follow-up lab at the next visit in 3 months  Vitamin D deficiency  Vitamin D improved from 16 5 to 26 5  Will continue ergocalciferol 50,000 IU weekly and ergocalciferol 4,000 IU daily  Will reassess with lab and follow-up in 3 months  Morbid obesity ([address] )  Referred to Weight Management for further evaluation and management of morbid obesity  Low vitamin B12 level  Continue cyanocobalamin 1,000 mcg daily  Iron deficiency anemia due to chronic blood loss  Continue to follow with Hematology for iron infusions  Continue ferrous sulfate 325 mg BID with vitamin C  Anemia stable on current labs  Hypertriglyceridemia  Improving  Continue Tricor 145 mg daily and Lovaza 2 gm BID  Hyperlipidemia LDL goal <70  With diagnosis of diabetes, patient's LDL goal is now < 70  Will start atorvastatin 20 mg daily  Will order labs at the next visit for follow-up  Depression  Having difficulty getting an appointment with psychologist referred previously  Will order a new referral to psychology for this issue  Diagnoses and all orders for this visit:    Vitamin D deficiency  -     ergocalciferol (VITAMIN D2) 50,000 units; Take 1 capsule (50,000 Units total) by mouth once a week  -     Ergocalciferol (VITAMIN D2) 50 MCG (2000 UT) TABS; Take 2 tablets (4,000 Units total) by mouth daily  -     Vitamin D 25 hydroxy; Future    Hypertriglyceridemia  -     fenofibrate (TRICOR) 145 mg tablet; Take 1 tablet (145 mg total) by mouth daily  -     omega-3-acid ethyl esters (LOVAZA) 1 g capsule;  Take 2 capsules (2 g total) by mouth 2 (two) times a day    Type 2 diabetes mellitus without complication, without long-term current use of insulin (HCC)  -     Discontinue: metFORMIN (GLUMETZA) 1000 MG (MOD) 24 hr tablet; Take 1 tablet (1,000 mg total) by mouth daily with breakfast  -     metFORMIN (FORTAMET) 1000 MG (OSM) 24 hr tablet; Take 1 tablet (1,000 mg total) by mouth daily with breakfast    Iron deficiency anemia due to chronic blood loss  -     ferrous sulfate 325 (65 Fe) mg tablet; Take 1 tablet (325 mg total) by mouth 2 (two) times a day with meals  -     ascorbic acid (VITAMIN C) 250 mg tablet; Take 1 tablet (250 mg total) by mouth daily    Low vitamin B12 level  -     cyanocobalamin (VITAMIN B-12) 1000 MCG tablet; Take 1 tablet (1,000 mcg total) by mouth daily    Morbid obesity ([address] )  -     Ambulatory referral to Weight Management; Future    Need for Streptococcus pneumoniae vaccination  -     PNEUMOCOCCAL POLYSACCHARIDE VACCINE 23-VALENT =>1YO SQ IM    Hyperlipidemia LDL goal <70  -     atorvastatin (LIPITOR) 20 mg tablet; Take 1 tablet (20 mg total) by mouth daily    Depression, unspecified depression type  -     Ambulatory referral to Psychology; Future          Subjective:      Patient ID: Mervin Fournier is a 21 y o  female  Patient presents to clinic today for a follow-up visit  She complete the labs that were ordered, and is here today to review results  All results are reviewed with her today, and all questions answered  She has no acute complaints today  The following portions of the patient's history were reviewed and updated as appropriate: allergies, current medications, past family history, past medical history, past social history, past surgical history and problem list     Review of Systems   Constitutional: Positive for fatigue  Cardiovascular: Negative for chest pain  Psychiatric/Behavioral: Positive for dysphoric mood (having trouble making an appointment to establish with psychology)           Objective:      /76 (BP Location: Left arm, Patient Position: Sitting, Cuff Size: Large)   Pulse 87   Temp 97 5 °F (36 4 °C) (Tympanic)   Resp 17   Ht 5' 3" (1 6 m)   Wt (!) 153 kg (337 lb)   SpO2 99%   BMI 59 70 kg/m²          Physical Exam   Constitutional: She is oriented to person, place, and time  She appears well-developed and well-nourished  She is cooperative  No distress  HENT:   Head: Normocephalic and atraumatic  Right Ear: Hearing and external ear normal    Left Ear: Hearing and external ear normal    Eyes: Conjunctivae and lids are normal    Cardiovascular: Normal rate  Pulmonary/Chest: Effort normal  No respiratory distress  Musculoskeletal: Normal range of motion  Neurological: She is alert and oriented to person, place, and time  She exhibits normal muscle tone  Gait normal    Skin: Skin is warm, dry and intact  Psychiatric: She has a normal mood and affect  Her speech is normal and behavior is normal  Thought content normal    Nursing note and vitals reviewed  Lab Results   Component Value Date    WBC 7 71 02/02/2020    HGB 10 0 (L) 02/02/2020    HCT 32 9 (L) 02/02/2020     02/02/2020    CHOL 141 10/31/2016    TRIG 217 (H) 02/02/2020    HDL 28 (L) 02/02/2020    LDLDIRECT 157 09/03/2019     (H) 02/02/2020     (H) 02/02/2020     05/02/2016    K 3 7 02/02/2020     (H) 02/02/2020    CREATININE 0 60 02/02/2020    BUN 8 02/02/2020    CO2 24 02/02/2020    INR 1 13 09/03/2019    GLUF 114 (H) 02/02/2020    HGBA1C 6 8 (H) 02/02/2020     BMI Counseling: Body mass index is 59 7 kg/m²  The BMI is above normal  Nutrition recommendations include encouraging healthy choices of fruits and vegetables  Exercise recommendations include exercising 3-5 times per week

## 2020-02-04 NOTE — ASSESSMENT & PLAN NOTE
Having difficulty getting an appointment with psychologist referred previously  Will order a new referral to psychology for this issue

## 2020-02-04 NOTE — ASSESSMENT & PLAN NOTE
Lab Results   Component Value Date    HGBA1C 6 8 (H) 02/02/2020     Patient's HgbA1c increased from 6 5% to 6 8%  Now diagnosed with diabetes  Will treat with metformin ER 1,000 mg daily  Continue to work on diet modification  Will order follow-up lab at the next visit in 3 months

## 2020-02-04 NOTE — ASSESSMENT & PLAN NOTE
Continue to follow with Hematology for iron infusions  Continue ferrous sulfate 325 mg BID with vitamin C  Anemia stable on current labs

## 2020-02-04 NOTE — PATIENT INSTRUCTIONS
Vitamin D Deficiency   AMBULATORY CARE:   Vitamin D deficiency  is a low level of vitamin D in your body  Vitamin D helps your body absorb calcium from foods  Your body makes vitamin D when your skin is exposed to sunlight  You can also get vitamin D from certain foods such as fish, eggs, and meat  Most of the vitamin D in your body comes from sunlight exposure  Common symptoms include the following:  Low levels of vitamin D can lead to weak and brittle bones that are more likely to fracture  You may not have any signs and symptoms, or you may have any of the following:  · Bone pain or discomfort in your lower back, pelvis, or legs    · Muscle aches and weakness    · Low back pain in women    · Poor growth, irritability, and frequent respiratory tract infections in infants    · Deformed bones and slow growth in children  Contact your healthcare provider for the following symptoms:   · Continued or worsening symptoms    · Nausea, vomiting, or a headache after possibly taking too much of a vitamin D supplement    · Questions or concerns about your condition or care  Treatment for vitamin D deficiency  includes high doses of vitamin D for 8 to 12 weeks to increase your levels  Your levels will then be rechecked  If your levels are still low, you will need to take vitamin D supplements for another 8 weeks  After your levels have gone back to normal, you may need to continue to take a vitamin D supplement  Amount of vitamin D do you need each day:  The amount of vitamin D you need depends on your age  You may need more than the recommended amounts below if you take certain medicines or you are obese  Ask your healthcare provider how much vitamin D you need  · Infants up to 1 year of age: 0 international units (IU)    · Children 1 year and older: 600 IU    · Adults aged 23to 79years old: 600 IU    · Adults older than 70 years: 800 IU  Prevent vitamin D deficiency:   · Eat foods that are high in vitamin D    Fatty fish such as mackerel, canned tuna and sardines, and salmon are good sources of vitamin D  Certain foods such as milk, juice, and cereal are fortified with vitamin D      · Give your  infant a vitamin D supplement  of 400 IU each day  · Take vitamin D supplements as directed  High doses of vitamin D can be toxic  Your healthcare provider will tell you how much vitamin D you should take each day  Vitamin D is best absorbed when taken with food  · Expose your skin to sunlight as directed  Ask your healthcare provider how you can safely expose your skin to sunlight and for how long  Too much exposure to sunlight can cause skin cancer  Follow up with your healthcare provider as directed:  Write down your questions so you remember to ask them during your visits  © 2017 [employer] Information is for End User's use only and may not be sold, redistributed or otherwise used for commercial purposes  All illustrations and images included in CareNotes® are the copyrighted property of A D A M , Inc  or [employer]  The above information is an  only  It is not intended as medical advice for individual conditions or treatments  Talk to your doctor, nurse or pharmacist before following any medical regimen to see if it is safe and effective for you

## 2020-02-05 DIAGNOSIS — E78.5 HYPERLIPIDEMIA LDL GOAL <70: ICD-10-CM

## 2020-02-05 RX ORDER — ROSUVASTATIN CALCIUM 10 MG/1
TABLET, COATED ORAL
Refills: 0 | OUTPATIENT
Start: 2020-02-05

## 2020-02-05 NOTE — TELEPHONE ENCOUNTER
Received notification from the pharmacy that rosuvastatin and atorvastatin are not covered by patient's insurance plan  Will prescribe lovastatin 40 mg daily  Please advise patient that the rosuvastatin discussed was changed to lovastatin due to patient's insurance plan

## 2020-02-07 ENCOUNTER — OFFICE VISIT (OUTPATIENT)
Dept: BARIATRICS | Facility: CLINIC | Age: 20
End: 2020-02-07
Payer: COMMERCIAL

## 2020-02-07 VITALS
WEIGHT: 293 LBS | HEIGHT: 63 IN | SYSTOLIC BLOOD PRESSURE: 122 MMHG | BODY MASS INDEX: 51.91 KG/M2 | HEART RATE: 98 BPM | DIASTOLIC BLOOD PRESSURE: 82 MMHG | TEMPERATURE: 99.1 F

## 2020-02-07 DIAGNOSIS — E78.5 HYPERLIPIDEMIA LDL GOAL <70: ICD-10-CM

## 2020-02-07 DIAGNOSIS — E11.9 TYPE 2 DIABETES MELLITUS WITHOUT COMPLICATION, WITHOUT LONG-TERM CURRENT USE OF INSULIN (HCC): ICD-10-CM

## 2020-02-07 DIAGNOSIS — E66.01 MORBID OBESITY WITH BMI OF 50.0-59.9, ADULT (HCC): Primary | ICD-10-CM

## 2020-02-07 PROCEDURE — 99214 OFFICE O/P EST MOD 30 MIN: CPT | Performed by: PHYSICIAN ASSISTANT

## 2020-02-07 PROCEDURE — 1036F TOBACCO NON-USER: CPT | Performed by: PHYSICIAN ASSISTANT

## 2020-02-07 PROCEDURE — 3008F BODY MASS INDEX DOCD: CPT | Performed by: PHYSICIAN ASSISTANT

## 2020-02-07 PROCEDURE — 3044F HG A1C LEVEL LT 7.0%: CPT | Performed by: PHYSICIAN ASSISTANT

## 2020-02-07 NOTE — ASSESSMENT & PLAN NOTE
Lab Results   Component Value Date    HGBA1C 6 8 (H) 02/02/2020     -on metformin  -Can consider victoza

## 2020-02-07 NOTE — ASSESSMENT & PLAN NOTE
-Patient is pursuing Conservative Program  -Initial weight loss goal of 5-10% weight loss for improved health  -Screening labs- reviewed  -not interested in bariatric surgery  -hx of migraines- can consider Topamax  -Can consider Victoza  -Calorie goals, sample menu, portion size guidelines, and food logging reviewed with the patient  Consider protein shake + fruit instead of skipping meals  Protein bar handout provided  Kitchen scale and portion control plate provided   Reviewed importance of food logging   -Referred for nutritional services and DM edu    Initial: 325 3  Current: 333 8  Change: +8 5 lbs since 8/16/19  Goal:

## 2020-02-07 NOTE — PATIENT INSTRUCTIONS
Goals: Food log (ie ) www myfitnesspal com,sparkpeople  com,loseit com,calorieking  com,etc  baritastic  No sugary beverages  At least 64oz of water daily  Pick zero calorie vitamin water  Increase physical activity by 10 minutes daily   Gradually increase physical activity to a goal of 5 days per week for 30 minutes of MODERATE intensity PLUS 2 days per week of FULL BODY resistance training  5-10 servings of fruits and vegetables per day and 25-35 grams of dietary fiber per day, gradually increasing  0166-8347 calories per day   Switch to brown rice

## 2020-02-07 NOTE — PROGRESS NOTES
Assessment/Plan: Morbid obesity with BMI of 50 0-59 9, adult [facility])  -Patient is pursuing Conservative Program  -Initial weight loss goal of 5-10% weight loss for improved health  -Screening labs- reviewed  -not interested in bariatric surgery  -hx of migraines- can consider Topamax  -Can consider Victoza  -Calorie goals, sample menu, portion size guidelines, and food logging reviewed with the patient  Consider protein shake + fruit instead of skipping meals  Protein bar handout provided  Kitchen scale and portion control plate provided  Reviewed importance of food logging   -Referred for nutritional services and DM edu    Initial: 325 3  Current: 333 8  Change: +8 5 lbs since 8/16/19  Goal:    Type 2 diabetes mellitus without complication, without long-term current use of insulin ([facility])    Lab Results   Component Value Date    HGBA1C 6 8 (H) 02/02/2020     -on metformin  -Can consider victoza    Hyperlipidemia LDL goal <70  -on statin, fenofibrate, and lovaza    Follow up in approximately 6-8 weeks with Non-Surgical Physician/Advanced Practitioner  Continue to monitor for fever or s/sx of infection  Recommend evaluation if either occur  Goals:  Food log (ie ) www myfitnesspal com,sparkpeople  com,loseit com,calorieking  com,etc  baritastic  No sugary beverages  At least 64oz of water daily  Pick zero calorie vitamin water  Increase physical activity by 10 minutes daily  Gradually increase physical activity to a goal of 5 days per week for 30 minutes of MODERATE intensity PLUS 2 days per week of FULL BODY resistance training  5-10 servings of fruits and vegetables per day and 25-35 grams of dietary fiber per day, gradually increasing  2215-2395 calories per day   Switch to brown rice     Diagnoses and all orders for this visit:    Morbid obesity with BMI of 50 0-59 9, adult ([facility] [address] )  -     Ambulatory referral to Weight Management  -     Ambulatory referral to Nutrition Services;  Future  -     Ambulatory referral to Diabetic Education; Future    Type 2 diabetes mellitus without complication, without long-term current use of insulin ([facility] [address] )  -     Ambulatory referral to Nutrition Services; Future  -     Ambulatory referral to Diabetic Education; Future    Hyperlipidemia LDL goal <70  -     Ambulatory referral to Nutrition Services; Future  -     Ambulatory referral to Diabetic Education; Future    Subjective:   Chief Complaint   Patient presents with    Follow-up     mwm follow up     Patient ID: Yumiko Sommers  is a 21 y o  female with excess weight/obesity here to pursue weight management  Patient is pursuing Conservative Program      HPI  The patient presents for MWM follow up  Has not f/u since her consult in 08/2019  Has since been trying to measure her food and had been adding exercise  She exercises on the elliptical for 45 minutes and treadmill for 30 minutes + exercise videos 3-4x/week    Food logging: no  Increased appetite/cravings: feels hungry, but trying to make healthy snack options  Hydration: fills her water bottle 3-4x/week  Has been trying to cut back on vitamin water  Encouraged her to choose the calorie free version  B: chicken sandwich on wheat bread  S: none  L: skips  S: recently trying to take fruit, but sometimes granola bar  D: protein + rice +/- vegetable  S: denies    The following portions of the patient's history were reviewed and updated as appropriate: allergies, current medications, past family history, past medical history, past social history, past surgical history and problem list     Review of Systems   HENT: Negative for congestion  Respiratory: Negative for cough and shortness of breath  Cardiovascular: Negative for chest pain and palpitations  Gastrointestinal: Negative for constipation, diarrhea and vomiting  Genitourinary: Negative for dysuria     Psychiatric/Behavioral:        States she gets "rangel" and sees a psychologist  Denies SI/HI     Objective:    /82 (BP Location: Right arm, Patient Position: Sitting, Cuff Size: Large)   Pulse 98   Temp 99 1 °F (37 3 °C)   Ht 5' 3" (1 6 m)   Wt (!) 151 kg (333 lb 12 8 oz)   BMI 59 13 kg/m²      Physical Exam   Nursing note and vitals reviewed  Constitutional   General appearance: Abnormal   well developed and morbidly obese  Eyes No conjunctival pallor  Ears, Nose, Mouth, and Throat Oral mucosa moist    Pulmonary   Respiratory effort: No increased work of breathing or signs of respiratory distress  Auscultation of lungs: Clear to auscultation, equal breath sounds bilaterally, no wheezes, no rales, no rhonci  Cardiovascular   Auscultation of heart: Normal rate and rhythm, normal S1 and S2, without murmurs  Examination of extremities for edema and/or varicosities: Normal   no edema  Abdomen   Abdomen: Abnormal   The abdomen was obese  Bowel sounds were normal  The abdomen was soft and nontender     Musculoskeletal   Gait and station: Normal     Psychiatric   Orientation to person, place and time: Normal     Affect: appropriate    Jared Closs, PA-S also present for the visit

## 2020-02-11 RX ORDER — SODIUM CHLORIDE 9 MG/ML
20 INJECTION, SOLUTION INTRAVENOUS ONCE
Status: CANCELLED | OUTPATIENT
Start: 2020-02-13

## 2020-02-11 NOTE — PROGRESS NOTES
Per [employer], patient's insurance company prefers Venofer over Jaffrey  Reviewed with MAN Kapadia and she is okay with Venofer 200 mg IV weekly x 3  Discontinued feraheme, and placed venofer orders  Called [facility] infusion and reviewed changed with Carol for scheduling adjustments  Terrilyn Gosselin will call patient to review change in product

## 2020-02-13 ENCOUNTER — HOSPITAL ENCOUNTER (OUTPATIENT)
Dept: INFUSION CENTER | Facility: HOSPITAL | Age: 20
Discharge: HOME | End: 2020-02-13
Attending: INTERNAL MEDICINE
Payer: COMMERCIAL

## 2020-02-13 DIAGNOSIS — D50.0 IRON DEFICIENCY ANEMIA DUE TO CHRONIC BLOOD LOSS: Primary | ICD-10-CM

## 2020-02-13 PROCEDURE — 96365 THER/PROPH/DIAG IV INF INIT: CPT

## 2020-02-13 RX ORDER — SODIUM CHLORIDE 9 MG/ML
20 INJECTION, SOLUTION INTRAVENOUS ONCE
Status: CANCELLED | OUTPATIENT
Start: 2020-02-20

## 2020-02-13 RX ORDER — SODIUM CHLORIDE 9 MG/ML
20 INJECTION, SOLUTION INTRAVENOUS ONCE
Status: COMPLETED | OUTPATIENT
Start: 2020-02-13 | End: 2020-02-13

## 2020-02-13 RX ADMIN — IRON SUCROSE 200 MG: 20 INJECTION, SOLUTION INTRAVENOUS at 10:40

## 2020-02-13 RX ADMIN — SODIUM CHLORIDE 20 ML/HR: 9 INJECTION, SOLUTION INTRAVENOUS at 10:39

## 2020-02-18 ENCOUNTER — OFFICE VISIT (OUTPATIENT)
Dept: PSYCHIATRY | Facility: CLINIC | Age: 20
End: 2020-02-18
Payer: COMMERCIAL

## 2020-02-18 DIAGNOSIS — F32.1 CURRENT MODERATE EPISODE OF MAJOR DEPRESSIVE DISORDER, UNSPECIFIED WHETHER RECURRENT (HCC): Primary | ICD-10-CM

## 2020-02-18 DIAGNOSIS — F41.9 ANXIETY: ICD-10-CM

## 2020-02-18 DIAGNOSIS — R79.89 ELEVATED LFTS: ICD-10-CM

## 2020-02-18 PROCEDURE — 90791 PSYCH DIAGNOSTIC EVALUATION: CPT | Performed by: PHYSICIAN ASSISTANT

## 2020-02-18 RX ORDER — ESCITALOPRAM OXALATE 5 MG/1
5 TABLET ORAL DAILY
Qty: 30 TABLET | Refills: 2 | Status: SHIPPED | OUTPATIENT
Start: 2020-02-18 | End: 2021-11-09

## 2020-02-18 NOTE — PSYCH
Outpatient Psychiatry Intake Exam       PCP: Gagan Mckeon MD    Referral source: Self Referred    Identifying information:  Candy Lin is a 21 y o  female with a history of depression here for evaluation and determination of mental health management needs  Sources of information:   Information for this evaluation was gathered through direct interview with the patient  Additionally Mood d/o Questionnaire, PHQ-9 and JOSE-7 scales were performed and some information was provided by initial intake packet  Confidentiality discussion: Limits of confidentiality in cases of safety concerns involving self and others as well as this physician's role as a mandatory  of abuse  They voiced understanding and a desire to continue with the evaluation  SUBJECTIVE     Chief Complaint / reason for visit: " I am having a hard time focusing and I have mood swings and cry more  "    History of Present Illness:    Patient presents today with PMH of depression complaining of mood swings with irritability and crying more, feeling more depressed, anxiety, decreased focus since 2018  When asked what brings her in after about 2 years she reports I finally have medical insurance and can address this    She reports triggers for her depression anxiety symptoms include her obesity as well as health issues associated with that  She also admits that a trigger in 2018 was not graduating high school on time  She also admits that she does not have a great relationship with both her parents, even though, she does currently still speak with both of them  She denies ever being on any psychiatric medications in the past reports my mom would never let me try any of them "    In terms of depression patient admits to most days with anhedonia and depressed mood, trouble falling asleep and having broken sleep, reports 5-6 hours each night    Reports some days having low energy, some days with change in appetite and it varies with the day if she has an increased appetite or decreased appetite  Admits to guilt most days, decreased concentration  Denies any psychomotor changes or SI/HI  She denies any history of suicide attempts but does admit when she was 15years old she did cut herself for self-harm for few weeks  Patient does admit to problem being decreased focus and concentration and feels her grades are slipping this semester  However, she reports this is new and she reports she did very well her 1st semester of college but feels with the increase in depression her focus is not there as much  She denies any history of hyperactivity, feeling fidgety or restless, not being able to stay in her seat for long time for such as meetings  She does admit to procrastination and waiting to last minute to finish tasks  Does admit having poor grades in her last year of high school but admits she had high depression at that time as well  In terms of anxiety patient admits to most days feeling anxious or on edge, some days with not being able to control worry about multiple things especially her school work and getting through school and living with multiple people in her boyfriend's home  She reports they are great to her and she does like them, but she reports she feels overwhelmed with a lot of people in the house  Also admits to feeling more irritable  Denies any trouble relaxing, restlessness, feeling afraid something awful might happen  Mood Disorder questionnaire was performed today as patient reports she has mood swings    During mood disorder questionnaire she admitted to more irritability, racing thoughts, distractibility  She denies any feeling of grandiosity or feeling hyper, poor sleep, changes in speech, high energy, thoughtless notice or indiscretions  Denies any history of bipolar disorder in the family  She also reports the distractibility and racing thoughts are when she is feeling more anxious or on edge  Denies any irritability lasting 4 more days  Stressors: weight issues, health issues, did not graduate HS on time    HPI ROS:  Medication Side Effects: denies  Depression: moderate /10 (10 worst)  Anxiety: moderate /10 (10 worst)  Safety (SI, HI, other): denies  Sleep: decreased, trouble falling asleep, 5-6 hours  Energy: low-normal  Appetite: varies, does admit she is eating healthier to lose weight and is working on healthier portions  Weight Change: denies    In terms of depression, the patient endorses change in appetite or weight, change in sleep, depressed mood, loss of energy, loss of interests/pleasure, thoughts of worthlessness or guilt, trouble concentrating     In terms of carlos, the patient endorses no, past manic episodes  Symptoms include Irritability, flight of ideas/racing thoughts  and distractibility nothing lasting longer than 4 or more days    JOSE symptoms: excessive worry more days than not for longer than 3 months, difficulty concentrating, insomnia and irritable  Panic Disorder symptoms: no symptoms suggestive of panic disorder  Social Anxiety symptoms: no symptoms suggestive of social anxiety  OCD Symptoms: No significant symptoms supportive of OCD  Eating Disorder symptoms: no historical or current eating disorder  In terms of PTSD, the patient endorses exposure to trauma involving: denies; In terms of psychotic symptoms, the patient reports no psychotic symptoms now or in the past     Past Psychiatric History  Previous diagnoses include depression  Prior outpatient psychiatric treatment: denies  Prior therapy: at [facility] with school counselor for 4 yrs    Prior inpatient psychiatric treatment: denies  Prior suicide attempts: denies  Prior self harm: cut self when 15 y/o   Prior violence or aggression: denies    Previous psychotropic medication trials:     Denies any hx of psychiatric medications as reports "my mom would never let me take anything when I was younger "    Social History:    The patient grew up in [place], [place]  Childhood was described as "pretty good  "  During childhood, parents were supportive, but patient reports they had a nam relationship she reports they have a better relationship now that they are not living together    They have 1 sister(s)  Abuse/neglect: none    As far as the patient (or present family member) is aware, overall childhood development: Patient does ascribe to normal developmental milestones such as walking, talking, potty training and making childhood friends  Education level: 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   Current occupation: student  Marital status: In long-term relationship with boyfriend Mine Moss  Children: denies   Current Living Situation: the patient currently lives with  and his family   Social support:  Great from boyfriend and his family, mom as well now that they are not living together, sister     Gnosticist Affiliation: denies   experience: denies  Legal history: denies  Access to Guns: denies    Substance use and treatment:  Tobacco use: denies  Caffeine Use: denies  ETOH use: denies  Other substance use:  Smokes marijuana once per week      Endorses previous experimentation with: denies    Traumatic History:     Abuse: none  Other Traumatic Events: none     Family Psychiatric History:     Family History   Problem Relation Age of Onset    Hypertension Mother     Hyperlipidemia Mother     Diabetes Mother     Depression Mother     Thyroid disease Sister     Anemia Sister     Depression Sister     Anxiety disorder Sister     Obesity Family     Allergies Family     Diabetes Family     Appendicitis Maternal Grandmother     Aneurysm Paternal Grandfather     Anemia Sister     Anemia Sister     Prostate cancer Paternal Uncle     Substance Abuse Neg Hx     Mental illness Neg Hx     Stroke Neg Hx             Past Medical / Surgical History:    Current PCP: Demetri Coker MD   Other providers include: OB/GYN, bariatrics, Hemotology    Patient Active Problem List   Diagnosis    Abscess of right breast    Current moderate episode of major depressive disorder ([facility] [address] )    Hypertriglyceridemia    Morbid obesity with BMI of 50 0-59 9, adult ([facility] [address] )    Obstructive sleep apnea    Vitamin D deficiency    Dextroscoliosis    Chronic headaches    Abnormal uterine bleeding    Iron deficiency anemia due to chronic blood loss    Low vitamin B12 level    Migraine without aura and without status migrainosus, not intractable    Elevated LFTs    Yeast dermatitis    Type 2 diabetes mellitus without complication, without long-term current use of insulin (HCC)    Hyperlipidemia LDL goal <70    Anxiety       Past Medical History-  Past Medical History:   Diagnosis Date    Anemia     Anxiety     Asthma     childhood    Chronic headache     Depression     Elevated LFTs     High cholesterol     Hyperlipidemia     Pre-diabetes     Scoliosis     Self-injurious behavior     when 15 y/o    Sleep apnea           History of Seizures: no  History of Head injury-LOC-Concussion: no    Past Surgical History-  Past Surgical History:   Procedure Laterality Date    BREAST CYST INCISION AND DRAINAGE Right 7/25/2017    Procedure: INCISION AND DRAINAGE (I&D) BREAST;  Surgeon: Lo Wood DO;  Location: [facility];  Service: General    BREAST SURGERY  2017    TONSILLECTOMY      TONSILLECTOMY  2016          Allergies: reviewed  No Known Allergies    Recent labs:  Appointment on 02/02/2020   Component Date Value    Cholesterol 02/02/2020 174     Triglycerides 02/02/2020 217*    HDL, Direct 02/02/2020 28*    LDL Calculated 02/02/2020 103*    Hemoglobin A1C 02/02/2020 6 8*    EAG 02/02/2020 148     Sodium 02/02/2020 141     Potassium 02/02/2020 3 7     Chloride 02/02/2020 109*    CO2 02/02/2020 24     ANION GAP 02/02/2020 8     BUN 02/02/2020 8     Creatinine 02/02/2020 0 60     Glucose, Fasting 02/02/2020 114*    Calcium 02/02/2020 9 1     AST 02/02/2020 101*    ALT 02/02/2020 154*    Alkaline Phosphatase 02/02/2020 84     Total Protein 02/02/2020 7 9     Albumin 02/02/2020 3 7     Total Bilirubin 02/02/2020 0 40     eGFR 02/02/2020 132     Hepatitis B Surface Ag 02/02/2020 Non-reactive     Hep A IgM 02/02/2020 Non-reactive     Hepatitis C Ab 02/02/2020 Non-reactive     Hep B C IgM 02/02/2020 Non-reactive     Vit D, 25-Hydroxy 02/02/2020 26 5*    WBC 02/02/2020 7 71     RBC 02/02/2020 3 93     Hemoglobin 02/02/2020 10 0*    Hematocrit 02/02/2020 32 9*    MCV 02/02/2020 84     MCH 02/02/2020 25 4*    MCHC 02/02/2020 30 4*    RDW 02/02/2020 15 7*    Platelets 74/49/0235 349     MPV 02/02/2020 11 7     Iron Saturation 02/02/2020 5     TIBC 02/02/2020 509*    Iron 02/02/2020 27*    Ferritin 02/02/2020 24      Labs were reviewed and discussed with patient    Medical Review Of Systems:     Pertinent items are noted in HPI        Medications:  Current Outpatient Medications on File Prior to Visit   Medication Sig Dispense Refill    albuterol (VENTOLIN HFA) 90 mcg/act inhaler Inhale 2 puffs every 6 (six) hours as needed for wheezing 18 g 0    ascorbic acid (VITAMIN C) 250 mg tablet Take 1 tablet (250 mg total) by mouth daily 180 tablet 1    atorvastatin (LIPITOR) 20 mg tablet Take 1 tablet (20 mg total) by mouth daily 90 tablet 1    cyanocobalamin (VITAMIN B-12) 1000 MCG tablet Take 1 tablet (1,000 mcg total) by mouth daily 90 tablet 0    Ergocalciferol (VITAMIN D2) 50 MCG (2000 UT) TABS Take 2 tablets (4,000 Units total) by mouth daily 90 tablet 1    ergocalciferol (VITAMIN D2) 50,000 units Take 1 capsule (50,000 Units total) by mouth once a week 12 capsule 0    fenofibrate (TRICOR) 145 mg tablet Take 1 tablet (145 mg total) by mouth daily 90 tablet 1    ferrous sulfate 325 (65 Fe) mg tablet Take 1 tablet (325 mg total) by mouth 2 (two) times a day with meals 180 tablet 1    lovastatin (ALTOPREV) 40 MG 24 hr tablet Take 1 tablet (40 mg total) by mouth daily at bedtime 90 tablet 1    metFORMIN (FORTAMET) 1000 MG (OSM) 24 hr tablet Take 1 tablet (1,000 mg total) by mouth daily with breakfast 90 tablet 1    omega-3-acid ethyl esters (LOVAZA) 1 g capsule Take 2 capsules (2 g total) by mouth 2 (two) times a day 360 capsule 1    medroxyPROGESTERone (PROVERA) 10 mg tablet Take 1 tablet (10 mg total) by mouth daily for 10 days (Patient not taking: Reported on 2/18/2020) 10 tablet 0    [DISCONTINUED] Cholecalciferol (VITAMIN D) 50 MCG (2000 UT) tablet Take 4,000 Units by mouth daily       No current facility-administered medications on file prior to visit  Medication Compliant? yes    All current active medications have been reviewed  Objective     OBJECTIVE     There were no vitals taken for this visit      MENTAL STATUS EXAM  Appearance:  age appropriate, dressed casually, overweight   Behavior:  pleasant, cooperative, with good eye contact   Speech:  Normal volume, regular rate and rhythm   Mood:  depressed and anxious   Affect:  mood congruent   Language: intact and appropriate for age and naming objects   Thought Process:  Linear and goal directed   Associations: intact associations   Thought Content:  normal and appropriate   Perceptual Disturbances: no auditory or visual hallcunations, does not appear responding to internal stimuli   Risk Potential / Abnormal Thoughts: Suicidal ideation - None  Homicidal ideation - None  Potential for aggression - No       Consciousness:  Alert & Awake   Sensorium:  Fully oriented to person, place, time/date   Attention: attention span and concentration are age appropriate   Intellect: within normal limits   Fund of Knowledge:  Memory: awareness of current events: yes  past history: yes  vocabulary: yes  recent and remote memory grossly intact   Insight:  good   Judgment: fair   Gait/Station: normal gait/station with good balance   Motor Activity: no abnormal movements     Pain none   Pain Scale 0     IMPRESSIONS/FORMULATION          Diagnoses and all orders for this visit:    Current moderate episode of major depressive disorder, unspecified whether recurrent (HCC)  -     escitalopram (LEXAPRO) 5 mg tablet; Take 1 tablet (5 mg total) by mouth daily    Anxiety  -     escitalopram (LEXAPRO) 5 mg tablet; Take 1 tablet (5 mg total) by mouth daily    Elevated LFTs        1  Current moderate episode of major depressive disorder, unspecified whether recurrent ([address] )    2  Anxiety    3  Elevated LFTs        Confidential Assessment: At this time I believe patient endorses MDD and anxiety  I do not believe patient endorses bipolar disorder as distractibility racing thoughts are related to anxiety and irritability can related to anxiety and depression as well  At this time I do not believe patient endorses ADD or ADHD even though she does admit to poor focus and concentration  However, poor focus and concentration can be related to depression as well and discussed with patient about monitoring further for rule out ADHD or ADD after focusing on reducing depression and anxiety symptoms  Scales:  PHQ-9:14  JOSE-7: 7    Damian Urena is a 21 y o  female who presents with symptoms supporting the aforementioned  Differential includes MDD, anxiety  Suicide / Homicide / Safety risk assessment: Safety risk low overall upon consideration of protective and risk factors  Plan:       Education about diagnosis and treatment modalities, patient voiced understanding and agreement with the following plan:    Discussed medication risks, beneftis, alternatives including suicidal thinking, increased depression or anxiety, serotonin syndrome, sedation, GI intolerance, hyponatremia, dizziness, headaches, sexual dysfunction, SIADH, possible increased risk of bleeding with antidepressants  Patient was informed and had time to ask questions   They agreed to treatment below, Risks, benefits, and possible side effects of medications explained to patient and patient verbalizes understanding, Risks of medications explained if female patient  Patient verbalizes understanding and agrees to notify her doctor if she becomes pregnant  Patient currently denies being pregnant or breast-feeding  She reports she has her menstrual cycle recently and Patient understands risks related to pregnancy and breastfeeding  PARQ regarding medications and treatment discussed and patient agreed to treatment below  Controlled Medication Discussion:     No recent records found for controlled prescriptions according to [facility] Prescription Drug Monitoring Program      Initial treatment plan:   1) MEDS:    - start Lexapro 5 mg p o  q d  for depression and anxiety symptoms as this medication may be safer with patient's elevated cholesterol and being on 2 different statins  Also discussed with patient about monitoring dose and remaining at lower dose at this time due to elevated LFTs which she is following with PCP for  2) Labs: aware to f/u with PCP and Hematology for recheck of labs  Labs from 2/2/20 reviewed in chart today  Elevated LFTs noted, kidney function WNL    3) Therapy:    - discussed with patient today about starting therapy for coping skills for depression anxiety and she is in agreement with this  Message sent to intake to set up new therapy appointment with patient    4) Medical:    - Pt will f/u with other providers as needed, including Hematology for low hemoglobin, bariatrics for morbid obesity, PCP for elevated LFTs      5) Follow up:   - Follow up in 6 wks    - Patient will call if issues or concerns     6) Treatment Plan:    - Enacted today    Discussed self monitoring of symptoms, and symptom monitoring tools       Patient has been informed of 24 hours and weekend coverage for urgent situations accessed by calling the main clinic phone number or calling 911 or getting to ED or calling the national suicide hotline for immediate medical attention or suicidality

## 2020-02-20 ENCOUNTER — HOSPITAL ENCOUNTER (OUTPATIENT)
Dept: INFUSION CENTER | Facility: HOSPITAL | Age: 20
Discharge: HOME | End: 2020-02-20
Attending: INTERNAL MEDICINE
Payer: COMMERCIAL

## 2020-02-20 DIAGNOSIS — D50.0 IRON DEFICIENCY ANEMIA DUE TO CHRONIC BLOOD LOSS: Primary | ICD-10-CM

## 2020-02-20 PROCEDURE — 96365 THER/PROPH/DIAG IV INF INIT: CPT

## 2020-02-20 RX ORDER — SODIUM CHLORIDE 9 MG/ML
20 INJECTION, SOLUTION INTRAVENOUS ONCE
Status: CANCELLED | OUTPATIENT
Start: 2020-02-27

## 2020-02-20 RX ORDER — SODIUM CHLORIDE 9 MG/ML
20 INJECTION, SOLUTION INTRAVENOUS ONCE
Status: COMPLETED | OUTPATIENT
Start: 2020-02-20 | End: 2020-02-20

## 2020-02-20 RX ADMIN — IRON SUCROSE 200 MG: 20 INJECTION, SOLUTION INTRAVENOUS at 13:02

## 2020-02-20 RX ADMIN — SODIUM CHLORIDE 20 ML/HR: 0.9 INJECTION, SOLUTION INTRAVENOUS at 13:01

## 2020-02-27 ENCOUNTER — HOSPITAL ENCOUNTER (OUTPATIENT)
Dept: INFUSION CENTER | Facility: HOSPITAL | Age: 20
Discharge: HOME | End: 2020-02-27
Attending: INTERNAL MEDICINE
Payer: COMMERCIAL

## 2020-02-27 VITALS
SYSTOLIC BLOOD PRESSURE: 136 MMHG | HEART RATE: 60 BPM | DIASTOLIC BLOOD PRESSURE: 96 MMHG | TEMPERATURE: 96.9 F | RESPIRATION RATE: 18 BRPM

## 2020-02-27 DIAGNOSIS — D50.0 IRON DEFICIENCY ANEMIA DUE TO CHRONIC BLOOD LOSS: Primary | ICD-10-CM

## 2020-02-27 PROCEDURE — 96365 THER/PROPH/DIAG IV INF INIT: CPT

## 2020-02-27 RX ORDER — SODIUM CHLORIDE 9 MG/ML
20 INJECTION, SOLUTION INTRAVENOUS ONCE
Status: CANCELLED | OUTPATIENT
Start: 2020-02-27

## 2020-02-27 RX ORDER — SODIUM CHLORIDE 9 MG/ML
20 INJECTION, SOLUTION INTRAVENOUS ONCE
Status: COMPLETED | OUTPATIENT
Start: 2020-02-27 | End: 2020-02-27

## 2020-02-27 RX ADMIN — IRON SUCROSE 200 MG: 20 INJECTION, SOLUTION INTRAVENOUS at 11:15

## 2020-02-27 RX ADMIN — SODIUM CHLORIDE 20 ML/HR: 0.9 INJECTION, SOLUTION INTRAVENOUS at 11:17

## 2020-02-27 NOTE — PLAN OF CARE
Problem: Potential for Falls  Goal: Patient will remain free of falls  Description  INTERVENTIONS:  - Assess patient frequently for physical needs  -  Identify cognitive and physical deficits and behaviors that affect risk of falls    -  [place] fall precautions as indicated by assessment   - Educate patient/family on patient safety including physical limitations  - Instruct patient to call for assistance with activity based on assessment  - Modify environment to reduce risk of injury  - Consider OT/PT consult to assist with strengthening/mobility  2/27/2020 1129 by Ana Bell RN  Outcome: Progressing  2/27/2020 1129 by Ana Bell, RN  Outcome: Progressing

## 2020-02-27 NOTE — PLAN OF CARE
Problem: Potential for Falls  Goal: Patient will remain free of falls  Description  INTERVENTIONS:  - Assess patient frequently for physical needs  -  Identify cognitive and physical deficits and behaviors that affect risk of falls    -  Forest fall precautions as indicated by assessment   - Educate patient/family on patient safety including physical limitations  - Instruct patient to call for assistance with activity based on assessment  - Modify environment to reduce risk of injury  - Consider OT/PT consult to assist with strengthening/mobility  Outcome: Progressing

## 2020-02-27 NOTE — PROGRESS NOTES
Patient tolerated treatment today without incident  Declined AVS  Ambulated off unit in no signs of distress

## 2020-04-16 ENCOUNTER — TELEPHONE (OUTPATIENT)
Dept: BEHAVIORAL/MENTAL HEALTH CLINIC | Facility: CLINIC | Age: 20
End: 2020-04-16

## 2020-06-02 ENCOUNTER — TELEPHONE (OUTPATIENT)
Dept: HEMATOLOGY ONCOLOGY | Facility: CLINIC | Age: 20
End: 2020-06-02

## 2020-06-08 DIAGNOSIS — D50.0 IRON DEFICIENCY ANEMIA DUE TO CHRONIC BLOOD LOSS: Primary | ICD-10-CM

## 2020-06-15 ENCOUNTER — APPOINTMENT (OUTPATIENT)
Dept: LAB | Facility: HOSPITAL | Age: 20
End: 2020-06-15
Payer: COMMERCIAL

## 2020-06-15 ENCOUNTER — TELEPHONE (OUTPATIENT)
Dept: HEMATOLOGY ONCOLOGY | Facility: CLINIC | Age: 20
End: 2020-06-15

## 2020-06-15 DIAGNOSIS — D50.0 IRON DEFICIENCY ANEMIA DUE TO CHRONIC BLOOD LOSS: ICD-10-CM

## 2020-06-15 DIAGNOSIS — E55.9 VITAMIN D DEFICIENCY: ICD-10-CM

## 2020-06-15 LAB
25(OH)D3 SERPL-MCNC: 15.4 NG/ML (ref 30–100)
BASOPHILS # BLD AUTO: 0.03 THOUSANDS/ΜL (ref 0–0.1)
BASOPHILS NFR BLD AUTO: 0 % (ref 0–1)
EOSINOPHIL # BLD AUTO: 0.14 THOUSAND/ΜL (ref 0–0.61)
EOSINOPHIL NFR BLD AUTO: 2 % (ref 0–6)
ERYTHROCYTE [DISTWIDTH] IN BLOOD BY AUTOMATED COUNT: 18 % (ref 11.6–15.1)
FERRITIN SERPL-MCNC: 4 NG/ML (ref 8–388)
HCT VFR BLD AUTO: 24 % (ref 34.8–46.1)
HGB BLD-MCNC: 6.8 G/DL (ref 11.5–15.4)
IMM GRANULOCYTES # BLD AUTO: 0.06 THOUSAND/UL (ref 0–0.2)
IMM GRANULOCYTES NFR BLD AUTO: 1 % (ref 0–2)
IRON SATN MFR SERPL: 3 %
IRON SERPL-MCNC: 16 UG/DL (ref 50–170)
LYMPHOCYTES # BLD AUTO: 3.17 THOUSANDS/ΜL (ref 0.6–4.47)
LYMPHOCYTES NFR BLD AUTO: 34 % (ref 14–44)
MCH RBC QN AUTO: 21.4 PG (ref 26.8–34.3)
MCHC RBC AUTO-ENTMCNC: 28.3 G/DL (ref 31.4–37.4)
MCV RBC AUTO: 76 FL (ref 82–98)
MONOCYTES # BLD AUTO: 0.62 THOUSAND/ΜL (ref 0.17–1.22)
MONOCYTES NFR BLD AUTO: 7 % (ref 4–12)
NEUTROPHILS # BLD AUTO: 5.19 THOUSANDS/ΜL (ref 1.85–7.62)
NEUTS SEG NFR BLD AUTO: 56 % (ref 43–75)
NRBC BLD AUTO-RTO: 0 /100 WBCS
PLATELET # BLD AUTO: 269 THOUSANDS/UL (ref 149–390)
PMV BLD AUTO: 11.6 FL (ref 8.9–12.7)
RBC # BLD AUTO: 3.18 MILLION/UL (ref 3.81–5.12)
TIBC SERPL-MCNC: 476 UG/DL (ref 250–450)
WBC # BLD AUTO: 9.21 THOUSAND/UL (ref 4.31–10.16)

## 2020-06-15 PROCEDURE — 85025 COMPLETE CBC W/AUTO DIFF WBC: CPT

## 2020-06-15 PROCEDURE — 36415 COLL VENOUS BLD VENIPUNCTURE: CPT

## 2020-06-15 PROCEDURE — 82728 ASSAY OF FERRITIN: CPT

## 2020-06-15 PROCEDURE — 83550 IRON BINDING TEST: CPT

## 2020-06-15 PROCEDURE — 83540 ASSAY OF IRON: CPT

## 2020-06-15 PROCEDURE — 82306 VITAMIN D 25 HYDROXY: CPT

## 2020-06-16 ENCOUNTER — TELEPHONE (OUTPATIENT)
Dept: HEMATOLOGY ONCOLOGY | Facility: CLINIC | Age: 20
End: 2020-06-16

## 2020-06-16 RX ORDER — SODIUM CHLORIDE 9 MG/ML
20 INJECTION, SOLUTION INTRAVENOUS ONCE
Status: CANCELLED | OUTPATIENT
Start: 2020-06-17

## 2020-06-16 RX ORDER — ACETAMINOPHEN 325 MG/1
650 TABLET ORAL ONCE
Status: CANCELLED | OUTPATIENT
Start: 2020-06-17

## 2020-06-17 ENCOUNTER — APPOINTMENT (OUTPATIENT)
Dept: LAB | Facility: CLINIC | Age: 20
End: 2020-06-17
Payer: COMMERCIAL

## 2020-06-17 ENCOUNTER — TELEPHONE (OUTPATIENT)
Dept: HEMATOLOGY ONCOLOGY | Facility: CLINIC | Age: 20
End: 2020-06-17

## 2020-06-17 ENCOUNTER — HOSPITAL ENCOUNTER (OUTPATIENT)
Dept: INFUSION CENTER | Facility: CLINIC | Age: 20
Discharge: HOME | End: 2020-06-17
Payer: COMMERCIAL

## 2020-06-17 ENCOUNTER — OFFICE VISIT (OUTPATIENT)
Dept: HEMATOLOGY ONCOLOGY | Facility: CLINIC | Age: 20
End: 2020-06-17
Payer: COMMERCIAL

## 2020-06-17 VITALS
TEMPERATURE: 98 F | RESPIRATION RATE: 16 BRPM | HEART RATE: 91 BPM | SYSTOLIC BLOOD PRESSURE: 141 MMHG | DIASTOLIC BLOOD PRESSURE: 80 MMHG

## 2020-06-17 VITALS
WEIGHT: 293 LBS | HEART RATE: 89 BPM | SYSTOLIC BLOOD PRESSURE: 138 MMHG | BODY MASS INDEX: 51.91 KG/M2 | OXYGEN SATURATION: 98 % | TEMPERATURE: 98.2 F | RESPIRATION RATE: 18 BRPM | HEIGHT: 63 IN | DIASTOLIC BLOOD PRESSURE: 88 MMHG

## 2020-06-17 DIAGNOSIS — D50.0 IRON DEFICIENCY ANEMIA DUE TO CHRONIC BLOOD LOSS: Primary | ICD-10-CM

## 2020-06-17 DIAGNOSIS — N93.9 ABNORMAL UTERINE BLEEDING: ICD-10-CM

## 2020-06-17 DIAGNOSIS — D50.0 IRON DEFICIENCY ANEMIA DUE TO CHRONIC BLOOD LOSS: ICD-10-CM

## 2020-06-17 LAB
ABO GROUP BLD: NORMAL
BLD GP AB SCN SERPL QL: NEGATIVE
RH BLD: NEGATIVE
SPECIMEN EXPIRATION DATE: NORMAL

## 2020-06-17 PROCEDURE — 36430 TRANSFUSION BLD/BLD COMPNT: CPT

## 2020-06-17 PROCEDURE — 86901 BLOOD TYPING SEROLOGIC RH(D): CPT

## 2020-06-17 PROCEDURE — 3008F BODY MASS INDEX DOCD: CPT | Performed by: PHYSICIAN ASSISTANT

## 2020-06-17 PROCEDURE — 86850 RBC ANTIBODY SCREEN: CPT

## 2020-06-17 PROCEDURE — P9016 RBC LEUKOCYTES REDUCED: HCPCS

## 2020-06-17 PROCEDURE — 99214 OFFICE O/P EST MOD 30 MIN: CPT | Performed by: PHYSICIAN ASSISTANT

## 2020-06-17 PROCEDURE — 86900 BLOOD TYPING SEROLOGIC ABO: CPT

## 2020-06-17 PROCEDURE — 86920 COMPATIBILITY TEST SPIN: CPT

## 2020-06-17 PROCEDURE — 3044F HG A1C LEVEL LT 7.0%: CPT | Performed by: PHYSICIAN ASSISTANT

## 2020-06-17 PROCEDURE — 1036F TOBACCO NON-USER: CPT | Performed by: PHYSICIAN ASSISTANT

## 2020-06-17 PROCEDURE — 96365 THER/PROPH/DIAG IV INF INIT: CPT

## 2020-06-17 PROCEDURE — 36415 COLL VENOUS BLD VENIPUNCTURE: CPT

## 2020-06-17 RX ORDER — SODIUM CHLORIDE 9 MG/ML
20 INJECTION, SOLUTION INTRAVENOUS ONCE
Status: CANCELLED | OUTPATIENT
Start: 2020-06-18

## 2020-06-17 RX ORDER — SODIUM CHLORIDE 9 MG/ML
20 INJECTION, SOLUTION INTRAVENOUS ONCE
Status: COMPLETED | OUTPATIENT
Start: 2020-06-17 | End: 2020-06-17

## 2020-06-17 RX ORDER — ACETAMINOPHEN 325 MG/1
650 TABLET ORAL ONCE
Status: COMPLETED | OUTPATIENT
Start: 2020-06-17 | End: 2020-06-17

## 2020-06-17 RX ORDER — ACETAMINOPHEN 325 MG/1
650 TABLET ORAL ONCE
Status: CANCELLED | OUTPATIENT
Start: 2020-06-18

## 2020-06-17 RX ADMIN — SODIUM CHLORIDE 20 ML/HR: 0.9 INJECTION, SOLUTION INTRAVENOUS at 13:11

## 2020-06-17 RX ADMIN — ACETAMINOPHEN 650 MG: 325 TABLET ORAL at 13:11

## 2020-06-17 RX ADMIN — DIPHENHYDRAMINE HYDROCHLORIDE 25 MG: 50 INJECTION, SOLUTION INTRAMUSCULAR; INTRAVENOUS at 13:20

## 2020-06-18 ENCOUNTER — OFFICE VISIT (OUTPATIENT)
Dept: FAMILY MEDICINE CLINIC | Facility: CLINIC | Age: 20
End: 2020-06-18
Payer: COMMERCIAL

## 2020-06-18 ENCOUNTER — HOSPITAL ENCOUNTER (OUTPATIENT)
Dept: INFUSION CENTER | Facility: HOSPITAL | Age: 20
Discharge: HOME | End: 2020-06-18

## 2020-06-18 VITALS
TEMPERATURE: 98.6 F | WEIGHT: 293 LBS | HEART RATE: 87 BPM | SYSTOLIC BLOOD PRESSURE: 140 MMHG | HEIGHT: 63 IN | DIASTOLIC BLOOD PRESSURE: 80 MMHG | RESPIRATION RATE: 18 BRPM | BODY MASS INDEX: 51.91 KG/M2 | OXYGEN SATURATION: 99 %

## 2020-06-18 DIAGNOSIS — E11.9 TYPE 2 DIABETES MELLITUS WITHOUT COMPLICATION, WITHOUT LONG-TERM CURRENT USE OF INSULIN (HCC): Primary | ICD-10-CM

## 2020-06-18 DIAGNOSIS — M72.2 PLANTAR FASCIITIS: ICD-10-CM

## 2020-06-18 DIAGNOSIS — E55.9 VITAMIN D DEFICIENCY: ICD-10-CM

## 2020-06-18 DIAGNOSIS — E78.5 HYPERLIPIDEMIA LDL GOAL <70: ICD-10-CM

## 2020-06-18 LAB
ABO GROUP BLD BPU: NORMAL
BPU ID: NORMAL
CREAT UR-MCNC: 35.5 MG/DL
CROSSMATCH: NORMAL
MICROALBUMIN UR-MCNC: 9 MG/L (ref 0–20)
MICROALBUMIN/CREAT 24H UR: 25 MG/G CREATININE (ref 0–30)
UNIT DISPENSE STATUS: NORMAL
UNIT PRODUCT CODE: NORMAL
UNIT RH: NORMAL

## 2020-06-18 PROCEDURE — 82043 UR ALBUMIN QUANTITATIVE: CPT | Performed by: FAMILY MEDICINE

## 2020-06-18 PROCEDURE — 1036F TOBACCO NON-USER: CPT | Performed by: FAMILY MEDICINE

## 2020-06-18 PROCEDURE — 99214 OFFICE O/P EST MOD 30 MIN: CPT | Performed by: FAMILY MEDICINE

## 2020-06-18 PROCEDURE — 82570 ASSAY OF URINE CREATININE: CPT | Performed by: FAMILY MEDICINE

## 2020-06-18 PROCEDURE — 3044F HG A1C LEVEL LT 7.0%: CPT | Performed by: FAMILY MEDICINE

## 2020-06-18 PROCEDURE — 4010F ACE/ARB THERAPY RXD/TAKEN: CPT | Performed by: FAMILY MEDICINE

## 2020-06-18 PROCEDURE — 3008F BODY MASS INDEX DOCD: CPT | Performed by: FAMILY MEDICINE

## 2020-06-18 PROCEDURE — 4010F ACE/ARB THERAPY RXD/TAKEN: CPT | Performed by: OBSTETRICS & GYNECOLOGY

## 2020-06-18 PROCEDURE — 3061F NEG MICROALBUMINURIA REV: CPT | Performed by: OBSTETRICS & GYNECOLOGY

## 2020-06-18 RX ORDER — LISINOPRIL 2.5 MG/1
2.5 TABLET ORAL DAILY
Qty: 90 TABLET | Refills: 1 | Status: SHIPPED | OUTPATIENT
Start: 2020-06-18 | End: 2020-10-06

## 2020-06-18 RX ORDER — SODIUM CHLORIDE 9 MG/ML
20 INJECTION, SOLUTION INTRAVENOUS ONCE
Status: CANCELLED | OUTPATIENT
Start: 2020-06-22

## 2020-06-19 ENCOUNTER — TELEPHONE (OUTPATIENT)
Dept: HEMATOLOGY ONCOLOGY | Facility: CLINIC | Age: 20
End: 2020-06-19

## 2020-06-19 ENCOUNTER — CONSULT (OUTPATIENT)
Dept: GYNECOLOGY | Facility: CLINIC | Age: 20
End: 2020-06-19
Payer: COMMERCIAL

## 2020-06-19 VITALS
DIASTOLIC BLOOD PRESSURE: 84 MMHG | WEIGHT: 293 LBS | HEIGHT: 64 IN | SYSTOLIC BLOOD PRESSURE: 124 MMHG | HEART RATE: 90 BPM | BODY MASS INDEX: 50.02 KG/M2

## 2020-06-19 DIAGNOSIS — D50.0 IRON DEFICIENCY ANEMIA DUE TO CHRONIC BLOOD LOSS: ICD-10-CM

## 2020-06-19 DIAGNOSIS — N92.1 MENORRHAGIA WITH IRREGULAR CYCLE: Primary | ICD-10-CM

## 2020-06-19 PROCEDURE — 99214 OFFICE O/P EST MOD 30 MIN: CPT | Performed by: OBSTETRICS & GYNECOLOGY

## 2020-06-19 PROCEDURE — 3008F BODY MASS INDEX DOCD: CPT | Performed by: OBSTETRICS & GYNECOLOGY

## 2020-06-19 PROCEDURE — 3044F HG A1C LEVEL LT 7.0%: CPT | Performed by: OBSTETRICS & GYNECOLOGY

## 2020-06-19 RX ORDER — ETONOGESTREL AND ETHINYL ESTRADIOL 11.7; 2.7 MG/1; MG/1
INSERT, EXTENDED RELEASE VAGINAL
Qty: 3 EACH | Refills: 3 | Status: SHIPPED | OUTPATIENT
Start: 2020-06-19 | End: 2021-04-07 | Stop reason: SDUPTHER

## 2020-06-20 ENCOUNTER — TELEPHONE (OUTPATIENT)
Dept: OTHER | Facility: OTHER | Age: 20
End: 2020-06-20

## 2020-06-22 ENCOUNTER — HOSPITAL ENCOUNTER (OUTPATIENT)
Dept: INFUSION CENTER | Facility: HOSPITAL | Age: 20
Discharge: HOME | End: 2020-06-22
Attending: INTERNAL MEDICINE
Payer: COMMERCIAL

## 2020-06-22 VITALS
SYSTOLIC BLOOD PRESSURE: 123 MMHG | DIASTOLIC BLOOD PRESSURE: 59 MMHG | HEART RATE: 93 BPM | TEMPERATURE: 97.9 F | RESPIRATION RATE: 18 BRPM

## 2020-06-22 DIAGNOSIS — D50.0 IRON DEFICIENCY ANEMIA DUE TO CHRONIC BLOOD LOSS: Primary | ICD-10-CM

## 2020-06-22 PROCEDURE — 96365 THER/PROPH/DIAG IV INF INIT: CPT

## 2020-06-22 PROCEDURE — 96366 THER/PROPH/DIAG IV INF ADDON: CPT

## 2020-06-22 RX ORDER — SODIUM CHLORIDE 9 MG/ML
20 INJECTION, SOLUTION INTRAVENOUS ONCE
Status: COMPLETED | OUTPATIENT
Start: 2020-06-22 | End: 2020-06-22

## 2020-06-22 RX ORDER — SODIUM CHLORIDE 9 MG/ML
20 INJECTION, SOLUTION INTRAVENOUS ONCE
Status: CANCELLED | OUTPATIENT
Start: 2020-06-29

## 2020-06-22 RX ADMIN — IRON SUCROSE 300 MG: 20 INJECTION, SOLUTION INTRAVENOUS at 09:45

## 2020-06-22 RX ADMIN — SODIUM CHLORIDE 20 ML/HR: 0.9 INJECTION, SOLUTION INTRAVENOUS at 09:45

## 2020-06-29 ENCOUNTER — HOSPITAL ENCOUNTER (OUTPATIENT)
Dept: INFUSION CENTER | Facility: HOSPITAL | Age: 20
Discharge: HOME | End: 2020-06-29
Attending: INTERNAL MEDICINE
Payer: COMMERCIAL

## 2020-06-29 VITALS
TEMPERATURE: 97.1 F | RESPIRATION RATE: 18 BRPM | SYSTOLIC BLOOD PRESSURE: 100 MMHG | DIASTOLIC BLOOD PRESSURE: 51 MMHG | HEART RATE: 101 BPM

## 2020-06-29 DIAGNOSIS — D50.0 IRON DEFICIENCY ANEMIA DUE TO CHRONIC BLOOD LOSS: Primary | ICD-10-CM

## 2020-06-29 PROCEDURE — 96365 THER/PROPH/DIAG IV INF INIT: CPT

## 2020-06-29 PROCEDURE — 96366 THER/PROPH/DIAG IV INF ADDON: CPT

## 2020-06-29 RX ORDER — SODIUM CHLORIDE 9 MG/ML
20 INJECTION, SOLUTION INTRAVENOUS ONCE
Status: CANCELLED | OUTPATIENT
Start: 2020-07-07

## 2020-06-29 RX ORDER — SODIUM CHLORIDE 9 MG/ML
20 INJECTION, SOLUTION INTRAVENOUS ONCE
Status: COMPLETED | OUTPATIENT
Start: 2020-06-29 | End: 2020-06-29

## 2020-06-29 RX ADMIN — IRON SUCROSE 300 MG: 20 INJECTION, SOLUTION INTRAVENOUS at 09:53

## 2020-06-29 RX ADMIN — SODIUM CHLORIDE 20 ML/HR: 0.9 INJECTION, SOLUTION INTRAVENOUS at 09:53

## 2020-07-09 RX ORDER — SODIUM CHLORIDE 9 MG/ML
20 INJECTION, SOLUTION INTRAVENOUS ONCE
Status: CANCELLED | OUTPATIENT
Start: 2020-07-13

## 2020-10-05 DIAGNOSIS — E11.9 TYPE 2 DIABETES MELLITUS WITHOUT COMPLICATION, WITHOUT LONG-TERM CURRENT USE OF INSULIN (HCC): ICD-10-CM

## 2020-10-05 DIAGNOSIS — E55.9 VITAMIN D DEFICIENCY: ICD-10-CM

## 2020-10-06 RX ORDER — LISINOPRIL 2.5 MG/1
TABLET ORAL
Qty: 90 TABLET | Refills: 1 | Status: SHIPPED | OUTPATIENT
Start: 2020-10-06 | End: 2021-03-30 | Stop reason: SDUPTHER

## 2020-10-06 RX ORDER — CHOLECALCIFEROL (VITAMIN D3) 125 MCG
CAPSULE ORAL
Qty: 90 CAPSULE | Refills: 1 | Status: SHIPPED | OUTPATIENT
Start: 2020-10-06 | End: 2021-11-12 | Stop reason: ALTCHOICE

## 2020-10-16 ENCOUNTER — TELEPHONE (OUTPATIENT)
Dept: FAMILY MEDICINE CLINIC | Facility: CLINIC | Age: 20
End: 2020-10-16

## 2020-12-01 DIAGNOSIS — D50.0 IRON DEFICIENCY ANEMIA DUE TO CHRONIC BLOOD LOSS: Primary | ICD-10-CM

## 2020-12-04 ENCOUNTER — TELEPHONE (OUTPATIENT)
Dept: HEMATOLOGY ONCOLOGY | Facility: CLINIC | Age: 20
End: 2020-12-04

## 2020-12-04 DIAGNOSIS — D50.0 IRON DEFICIENCY ANEMIA DUE TO CHRONIC BLOOD LOSS: Primary | ICD-10-CM

## 2020-12-15 DIAGNOSIS — E11.9 TYPE 2 DIABETES MELLITUS WITHOUT COMPLICATION, WITHOUT LONG-TERM CURRENT USE OF INSULIN (HCC): ICD-10-CM

## 2021-01-02 ENCOUNTER — HOSPITAL ENCOUNTER (EMERGENCY)
Facility: HOSPITAL | Age: 21
Discharge: HOME | End: 2021-01-02
Attending: EMERGENCY MEDICINE
Payer: COMMERCIAL

## 2021-01-02 VITALS — HEART RATE: 101 BPM | TEMPERATURE: 98.1 F | OXYGEN SATURATION: 98 % | HEIGHT: 63 IN | BODY MASS INDEX: 56.76 KG/M2

## 2021-01-02 DIAGNOSIS — M54.9 MUSCULOSKELETAL BACK PAIN: ICD-10-CM

## 2021-01-02 DIAGNOSIS — D50.0 IRON DEFICIENCY ANEMIA DUE TO CHRONIC BLOOD LOSS: Primary | ICD-10-CM

## 2021-01-02 DIAGNOSIS — M54.50 LOW BACK PAIN: Primary | ICD-10-CM

## 2021-01-02 PROCEDURE — 99284 EMERGENCY DEPT VISIT MOD MDM: CPT | Performed by: EMERGENCY MEDICINE

## 2021-01-02 PROCEDURE — 99283 EMERGENCY DEPT VISIT LOW MDM: CPT

## 2021-01-02 RX ORDER — CYCLOBENZAPRINE HCL 10 MG
10 TABLET ORAL 2 TIMES DAILY PRN
Qty: 20 TABLET | Refills: 0 | Status: SHIPPED | OUTPATIENT
Start: 2021-01-02 | End: 2021-11-12 | Stop reason: ALTCHOICE

## 2021-01-02 RX ORDER — LIDOCAINE 50 MG/G
1 PATCH TOPICAL ONCE
Status: DISCONTINUED | OUTPATIENT
Start: 2021-01-02 | End: 2021-01-02 | Stop reason: HOSPADM

## 2021-01-02 RX ORDER — CYCLOBENZAPRINE HCL 10 MG
10 TABLET ORAL ONCE
Status: COMPLETED | OUTPATIENT
Start: 2021-01-02 | End: 2021-01-02

## 2021-01-02 RX ADMIN — CYCLOBENZAPRINE HYDROCHLORIDE 10 MG: 10 TABLET, FILM COATED ORAL at 20:20

## 2021-01-02 RX ADMIN — LIDOCAINE 1 PATCH: 50 PATCH TOPICAL at 20:20

## 2021-01-02 NOTE — Clinical Note
Mervin Fournier was seen and treated in our emergency department on 1/2/2021  Diagnosis:     Jerome Starr  may return to work on return date  She may return on this date: 01/06/2021         If you have any questions or concerns, please don't hesitate to call        Venus Laurent MD    ______________________________           _______________          _______________  Hospital Representative                              Date                                Time

## 2021-01-03 NOTE — ED ATTENDING ATTESTATION
1/2/2021  IShanae DO, saw and evaluated the patient  I have discussed the patient with the resident/non-physician practitioner and agree with the resident's/non-physician practitioner's findings, Plan of Care, and MDM as documented in the resident's/non-physician practitioner's note, except where noted  All available labs and Radiology studies were reviewed  I was present for key portions of any procedure(s) performed by the resident/non-physician practitioner and I was immediately available to provide assistance  At this point I agree with the current assessment done in the Emergency Department  I have conducted an independent evaluation of this patient a history and physical is as follows:    26yo female presents with back pain  Pt has h/o back pain and gets back pain from time to time  Pt states pain in low back, radiates to right leg, no fevers, no bowel or bladder complaints  On exam - nad, heart reg, no resp distress, diffuse lumbar paraspinal tenderness, muscle strength 5/5 b/l LE with sensation intact  Plan - lidoderm patch, NSAIDS, muscle relaxer, comprehensive spine      ED Course         Critical Care Time  Procedures

## 2021-01-03 NOTE — ED PROVIDER NOTES
Final Diagnosis:  1  Low back pain    2  Musculoskeletal back pain        Chief Complaint   Patient presents with    Back Pain     pt c/o back pain for 2 days  pt has past hx of disc issues back in 2015  pt denies any recent rauma to back at this time  HPI  Patient presents for evaluation of low back pain  Patient states she has a history of a slipped disc secondary to a fall  Previously she went to physical therapy for resolution of her back pain  She states that 2 days ago she started to have similar back pain when she awoke from sleep  No known trauma to the area, heavy lifting, new movements  This pain is located midline in her low back and worsened with specific twisting of the torso  She states that it is intermittently worse with certain movements, feels like a spasming or clenching sensation  She denies any drug use, history of cancer, new trauma, bowel or bladder incontinence, saddle anesthesia, difficulty with ambulation  Patient has not followed up with a another provider for further evaluation of her slipped disc  Patient offers me no other medical complaints  Patient denies any dysuria, hematuria, fever chills, IV drug use  Patient denies the following concerning findings on history:  IV Drug Use  HIV/Immuncompromised  Fever or Chills  History of Cancer  Night Sweats  Unexplained Weight Loss  Sudden tearing chest pain  Weakness/Numbness  Saddle anesthesia  Bowel/bladder incontinence or retention  Recent fall or trauma  Chronic steroid use  Osteoporosis    Patient does not have the following concerning findings on exam:  Fever  Patient writhing in pain   Anal sphincter laxity   Perianal/perineal sensory loss (Saddle Anesthesia)   Palpable bladder post voiding or abnormal post-void residual  Point vertebral tenderness  Neurological deficits   Positive straight leg raise      - No language barrier    - History obtained from patient     - There are no limitations to the history obtained  - Previous charting was reviewed    PMH:   has a past medical history of Anemia, Anxiety, Asthma, Chronic headache, Depression, Elevated LFTs, High cholesterol, Hyperlipidemia, Pre-diabetes, Scoliosis, Self-injurious behavior, and Sleep apnea  PSH:   has a past surgical history that includes Tonsillectomy; Breast incision and drainage (Right, 7/25/2017); Tonsillectomy (2016); and Breast surgery (2017)  ROS:  Review of Systems   Constitutional: Negative for chills, diaphoresis, fatigue and fever  Respiratory: Negative for cough and shortness of breath  Cardiovascular: Negative for chest pain and palpitations  Gastrointestinal: Negative for abdominal distention, abdominal pain, constipation, diarrhea, nausea and vomiting  Genitourinary: Negative for dysuria, frequency and hematuria  Musculoskeletal: Positive for back pain  Negative for arthralgias, myalgias and neck pain  Neurological: Negative for dizziness, syncope, light-headedness and headaches  All other systems reviewed and are negative  PE:   Vitals:    01/02/21 1927   Pulse: 101   Temp: 98 1 °F (36 7 °C)   TempSrc: Oral   SpO2: 98%   Height: 5' 3" (1 6 m)     Vitals reviewed by me  Physical Exam  Vitals signs reviewed  Constitutional:       General: She is not in acute distress  Appearance: She is not diaphoretic  HENT:      Head: Normocephalic and atraumatic  Right Ear: External ear normal       Left Ear: External ear normal    Eyes:      General: No scleral icterus  Right eye: No discharge  Left eye: No discharge  Conjunctiva/sclera: Conjunctivae normal       Pupils: Pupils are equal, round, and reactive to light  Neck:      Musculoskeletal: Normal range of motion and neck supple  Vascular: No JVD  Cardiovascular:      Rate and Rhythm: Normal rate and regular rhythm  Heart sounds: Normal heart sounds  No murmur  No friction rub  No gallop      Pulmonary:      Effort: Pulmonary effort is normal  No respiratory distress  Breath sounds: Normal breath sounds  No wheezing or rales  Abdominal:      General: Bowel sounds are normal  There is no distension  Palpations: Abdomen is soft  There is no mass  Tenderness: There is no abdominal tenderness  There is no guarding  Musculoskeletal: Normal range of motion  General: Tenderness present  No deformity  Comments: Patient with generalized paraspinal tenderness in lumbar region  No step-offs or deformities, no point tenderness  Skin:     General: Skin is warm  Neurological:      Mental Status: She is alert and oriented to person, place, and time  Cranial Nerves: No cranial nerve deficit  Sensory: No sensory deficit  Motor: No abnormal muscle tone  Coordination: Coordination normal       Comments: GCS 15  Sensation grossly intact  No cranial nerve deficits noted  5/5 strength bilateral upper and lower extremities  Finger-to-nose good  Heel to shin good  No pronator drift noted  Was able to ambulate without difficulty  Psychiatric:         Behavior: Behavior normal          Thought Content: Thought content normal          Judgment: Judgment normal           A:  - Nursing note reviewed  -                      No orders to display     Orders Placed This Encounter   Procedures    Ambulatory Referral to Comprehensive Spine Program     Labs Reviewed - No data to display      Final Diagnosis:  1  Low back pain    2   Musculoskeletal back pain        P:  - Lidoderm patch, Flexeril  -provided with information for follow-up with comprehensive Spine program  -return precautions    Medications   lidocaine (LIDODERM) 5 % patch 1 patch (1 patch Topical Medication Applied 1/2/21 2020)   cyclobenzaprine (FLEXERIL) tablet 10 mg (10 mg Oral Given 1/2/21 2020)     Time reflects when diagnosis was documented in both MDM as applicable and the Disposition within this note     Time User Action Codes Description Comment    1/2/2021  7:53 PM Ruchi Byrnes Add [M54 5] Low back pain     1/2/2021  7:53 PM Ruchi Byrnes Add [M54 9] Musculoskeletal back pain       ED Disposition     ED Disposition Condition Date/Time Comment    Discharge Stable Sat Jan 2, 2021  7:53 PM Damian Urena discharge to home/self care  Follow-up Information     Follow up With Specialties Details Why Contact Info Additional Information    Any Naqvi MD Family Medicine   [address] Emergency Department Emergency Medicine  If symptoms worsen [address] [id number]  [phone] [facility] ED, [address] [place] [address] 108    [address] [facility] Physical Therapy   [phone]        Discharge Medication List as of 1/2/2021  7:54 PM      START taking these medications    Details   cyclobenzaprine (FLEXERIL) 10 mg tablet Take 1 tablet (10 mg total) by mouth 2 (two) times a day as needed for muscle spasms, Starting Sat 1/2/2021, Print         CONTINUE these medications which have NOT CHANGED    Details   albuterol (VENTOLIN HFA) 90 mcg/act inhaler Inhale 2 puffs every 6 (six) hours as needed for wheezing, Starting Thu 1/3/2019, Normal      ascorbic acid (VITAMIN C) 250 mg tablet Take 1 tablet (250 mg total) by mouth daily, Starting Tue 2/4/2020, Normal      atorvastatin (LIPITOR) 20 mg tablet Take 1 tablet (20 mg total) by mouth daily, Starting Tue 2/4/2020, Normal      [employer] D3 125 MCG (5000 UT) capsule TAKE 1 CAPSULE (5,000 UNITS TOTAL) BY MOUTH DAILY, Normal      cyanocobalamin (VITAMIN B-12) 1000 MCG tablet Take 1 tablet (1,000 mcg total) by mouth daily, Starting Tue 2/4/2020, Normal      escitalopram (LEXAPRO) 5 mg tablet Take 1 tablet (5 mg total) by mouth daily, Starting Tue 2/18/2020, Until Thu 3/19/2020, Normal      etonogestrel-ethinyl estradiol (NUVARING) 0 12-0 015 MG/24HR vaginal ring Insert vaginally and leave in place for 3 consecutive weeks, then remove for 1 week , Normal      fenofibrate (TRICOR) 145 mg tablet Take 1 tablet (145 mg total) by mouth daily, Starting Tue 2/4/2020, Normal      ferrous sulfate 325 (65 Fe) mg tablet Take 1 tablet (325 mg total) by mouth 2 (two) times a day with meals, Starting Tue 2/4/2020, Normal      lisinopril (ZESTRIL) 2 5 mg tablet TAKE 1 TABLET BY MOUTH EVERY DAY, Normal      lovastatin (ALTOPREV) 40 MG 24 hr tablet Take 1 tablet (40 mg total) by mouth daily at bedtime, Starting Wed 2/5/2020, Normal      medroxyPROGESTERone (PROVERA) 10 mg tablet Take 1 tablet (10 mg total) by mouth daily for 10 days, Starting Mon 2/3/2020, Until Thu 2/13/2020, Normal      metFORMIN (GLUCOPHAGE) 1000 MG tablet TAKE 1 TABLET BY MOUTH TWICE A DAY WITH MEALS, Normal      omega-3-acid ethyl esters (LOVAZA) 1 g capsule Take 2 capsules (2 g total) by mouth 2 (two) times a day, Starting Tue 2/4/2020, Normal             Prior to Admission Medications   Prescriptions Last Dose Informant Patient Reported? Taking? [facility]S D3 125 MCG (5000 UT) capsule   No No   Sig: TAKE 1 CAPSULE (5,000 UNITS TOTAL) BY MOUTH DAILY   albuterol (VENTOLIN HFA) 90 mcg/act inhaler  Self No No   Sig: Inhale 2 puffs every 6 (six) hours as needed for wheezing   ascorbic acid (VITAMIN C) 250 mg tablet  Self No No   Sig: Take 1 tablet (250 mg total) by mouth daily   atorvastatin (LIPITOR) 20 mg tablet  Self No No   Sig: Take 1 tablet (20 mg total) by mouth daily   cyanocobalamin (VITAMIN B-12) 1000 MCG tablet  Self No No   Sig: Take 1 tablet (1,000 mcg total) by mouth daily   escitalopram (LEXAPRO) 5 mg tablet   No No   Sig: Take 1 tablet (5 mg total) by mouth daily   etonogestrel-ethinyl estradiol (NUVARING) 0 12-0 015 MG/24HR vaginal ring   No No   Sig: Insert vaginally and leave in place for 3 consecutive weeks, then remove for 1 week     fenofibrate (TRICOR) 145 mg tablet  Self No No   Sig: Take 1 tablet (145 mg total) by mouth daily   ferrous sulfate 325 (65 Fe) mg tablet  Self No No   Sig: Take 1 tablet (325 mg total) by mouth 2 (two) times a day with meals   lisinopril (ZESTRIL) 2 5 mg tablet   No No   Sig: TAKE 1 TABLET BY MOUTH EVERY DAY   lovastatin (ALTOPREV) 40 MG 24 hr tablet  Self No No   Sig: Take 1 tablet (40 mg total) by mouth daily at bedtime   medroxyPROGESTERone (PROVERA) 10 mg tablet  Self No No   Sig: Take 1 tablet (10 mg total) by mouth daily for 10 days   Patient not taking: Reported on 2/18/2020   metFORMIN (GLUCOPHAGE) 1000 MG tablet   No No   Sig: TAKE 1 TABLET BY MOUTH TWICE A DAY WITH MEALS   omega-3-acid ethyl esters (LOVAZA) 1 g capsule  Self No No   Sig: Take 2 capsules (2 g total) by mouth 2 (two) times a day      Facility-Administered Medications: None       Portions of the record may have been created with voice recognition software  Occasional wrong word or "sound a like" substitutions may have occurred due to the inherent limitations of voice recognition software  Read the chart carefully and recognize, using context, where substitutions have occurred      Electronically signed by:  Kristeen Riedel, PGY 3, MD Monica De La Rosa MD  01/02/21 2127

## 2021-01-14 ENCOUNTER — NURSE TRIAGE (OUTPATIENT)
Dept: PHYSICAL THERAPY | Facility: OTHER | Age: 21
End: 2021-01-14

## 2021-01-14 DIAGNOSIS — G89.29 ACUTE EXACERBATION OF CHRONIC LOW BACK PAIN: Primary | ICD-10-CM

## 2021-01-14 DIAGNOSIS — M54.50 ACUTE EXACERBATION OF CHRONIC LOW BACK PAIN: Primary | ICD-10-CM

## 2021-01-14 NOTE — TELEPHONE ENCOUNTER
Additional Information   Negative: Is this related to a work injury?  Negative: Is this related to an MVA?  Negative: Are you currently recieving homecare services?  Negative: Has the patient had unexplained weight loss?  Negative: Does the patient have a fever?  Negative: Is the patient experiencing blood in sputum?  Negative: Is the patient experiencing urine retention?  Negative: Is the patient experiencing acute drop foot or paralysis?  Negative: Has the patient experienced major trauma? (fall from height, high speed collision, direct blow to spine) and is also experiencing nausea, light-headedness, or loss of consciousness?  Negative: Is this a chronic condition? Background - Initial Assessment  Clinical complaint: bilateral lower back pain  Non radiaiting "except for the day I went to the ED and then the pain went down the right leg " No numbness or tingling noted  States she "frequently has off and on back pain " Has done PT in 2016 for the back  No injury or incident noted  Date of onset: 12/31/2020  Frequency of pain: constant  Quality of pain: shooting, cramping, stabbing, and throbbing    Protocols used: [facility] COMPREHENSIVE SPINE PROGRAM PROTOCOL    This RN did review in detail the Comprehensive Spine Program and what we can provide for their back pain  Patient is agreeable to being triaged by this RN and would like to proceed with Physical Therapy  Referral was placed for Physical Therapy at the [facility] site  Patients information was sent to the  to make evaluation appointment  Patient made aware that the PT office  will be calling to schedule the appointment  Patient was provided with the phone number to the PT office  No further questions and/or concerns were voiced by the patient at this time  Patient states understanding of the referral that was placed  Referral Closed

## 2021-01-22 ENCOUNTER — EVALUATION (OUTPATIENT)
Dept: PHYSICAL THERAPY | Facility: REHABILITATION | Age: 21
End: 2021-01-22
Payer: COMMERCIAL

## 2021-01-22 VITALS — TEMPERATURE: 97.4 F | HEART RATE: 74 BPM | SYSTOLIC BLOOD PRESSURE: 120 MMHG | DIASTOLIC BLOOD PRESSURE: 88 MMHG

## 2021-01-22 DIAGNOSIS — G89.29 ACUTE EXACERBATION OF CHRONIC LOW BACK PAIN: Primary | ICD-10-CM

## 2021-01-22 DIAGNOSIS — M54.50 ACUTE EXACERBATION OF CHRONIC LOW BACK PAIN: Primary | ICD-10-CM

## 2021-01-22 PROCEDURE — 97162 PT EVAL MOD COMPLEX 30 MIN: CPT | Performed by: PHYSICAL THERAPIST

## 2021-01-22 NOTE — PROGRESS NOTES
PT Evaluation     Today's date: 2021  Patient name: Fani Ross  : 2000  MRN: [id number]  Referring provider: Ana Winslow DO  Dx:   Encounter Diagnosis     ICD-10-CM    1  Acute exacerbation of chronic low back pain  M54 5 Ambulatory referral to PT spine    G89 29                   Assessment  Assessment details: Patient presents with decreased lumbar ROM, decreased hip/core strength, postural deficits and decreased function secondary to acute on chronic lumbar pain  Patient would benefit from skilled PT intervention to address these issues and to maximize function  Thank you for the referral   Impairments: abnormal or restricted ROM, activity intolerance, impaired physical strength, lacks appropriate home exercise program, pain with function, poor posture  and poor body mechanics  Understanding of Dx/Px/POC: good   Prognosis: good    Goals  Short Term:  Pt will report decreased levels of pain by at least 2 subjective ratings in 4 weeks  Pt will demonstrate improved ROM by at least 25% in 4 weeks  Pt will demonstrate improved strength by 1/2 grade MMT in 4 weeks  Long Term:   Pt will be independent in their HEP in 8 weeks  Pt will demonstrate improved FOTO, > predicted level  Pt will be independent with all ADL's  Pt will be able to ambulate community distances without pain    Plan  Plan details: Patient was educated in [address] [place] [address] and Plan of Care  All questions were answered to pt's satisfaction  As per pt's Start Back Assessment score, pt is in the high risk category (12-18 visits)      Patient would benefit from: skilled physical therapy  Planned therapy interventions: joint mobilization, manual therapy, neuromuscular re-education, patient education, postural training, abdominal trunk stabilization, body mechanics training, flexibility, therapeutic exercise, therapeutic activities, graded exercise and home exercise program  Frequency: 2x week  Duration in weeks: 6  Plan of Care beginning date: 2021  Plan of Care expiration date: 3/5/2021  Treatment plan discussed with: patient        Subjective Evaluation    History of Present Illness  Mechanism of injury: Pt is a 24 y o female with a history of chronic LBP since falling down the stairs at age 13  Pt notes being diagnosed with scoliosis also at that time  Pt had PT in the past around that time with some relief  Pt complained of increased LBP upon waking the end of 2020 of insidious onset  Pt notes she had pain that radiated to her R knee  Pt had extreme difficulty moving around  Pt went to the ED 21, denies imaging  Pt was given muscle relaxers and a pain patch  Pt was referred to PT via Comprehensive spine program   Pt reports pain/dififuclty with bending down, lifting things, donning pants/socks, laundry, standing (limited to 15 mins of static standing), sleep, ambulation (limited tolerance)  Pain  At best pain ratin  At worst pain ratin  Location: lumbar spine, primarily R side      Diagnostic Tests  No diagnostic tests performed  Treatments  Previous treatment: physical therapy  Current treatment: medication  Patient Goals  Patient goals for therapy: decreased pain, increased motion, independence with ADLs/IADLs, return to sport/leisure activities and increased strength          Objective     Concurrent Complaints  Positive for disturbed sleep  Additional Special Questions  Patient denies loss of bowel/bladder control, saddle anesthesia, unexplained weight loss, history of cancer  Postural Observations  Seated posture: fair        Neurological Testing     Reflexes   Left   Patellar (L4): normal (2+)  Achilles (S1): normal (2+)  Clonus sign: negative    Right   Patellar (L4): normal (2+)  Achilles (S1): normal (2+)  Clonus sign: negative    Additional Neurological Details  Pt reports intermittent numbness R lumbar spine       Active Range of Motion     Additional Active Range of Motion Details  L/S AROM (% of normal):  Flex=50% with lumbar stiffness;  Repeated=sharp central pain  Ext=50% without pain; repeated=no change, no pain  RSB=wfl  LSB=wfl with some stretching  R rot=wfl  L rot=50% with stretching    Strength/Myotome Testing     Left Hip   Planes of Motion   Flexion: 5  Extension: 4-  Abduction: 4-  External rotation: 4-    Right Hip   Planes of Motion   Flexion: 5  Extension: 4-  Abduction: 4-  External rotation: 4-    Left Knee   Flexion: 5  Extension: 5    Right Knee   Flexion: 5  Extension: 5    Left Ankle/Foot   Dorsiflexion: 5  Plantar flexion: 5 (seated)  Great toe extension: 5    Right Ankle/Foot   Dorsiflexion: 5  Plantar flexion: 5 (seated)  Great toe extension: 5    Tests     Lumbar   Positive prone instability   Negative SIJ compression, sacroiliac distraction and sacral thrust       Left   Negative crossed SLR, passive SLR and slump test      Right   Negative crossed SLR, passive SLR and slump test      Left Pelvic Girdle/Sacrum   Positive: active SLR test      Right Pelvic Girdle/Sacrum   Negative: thigh thrust and active SLR test      Left Hip   Negative KIERRA  Right Hip   Positive KIERRA  Additional Tests Details  (+)active SLR w/stabilization on left  Flowsheet Rows      Most Recent Value   PT/OT G-Codes   Current Score  67   Projected Score  67             Precautions: hx of chronic LBP, scoliosis, sleep apnea, depressions, anxiety, asthma, anemia        Manuals 1/22            S/L R lumbar gapping gr 4-5 TP 3 mins            L3-5 CPA's gr 3-4                                       Neuro Re-Ed             TA w/TB LPD             TA w/TB rows             TA w/TB multifidus press                                                                 Ther Ex             Tm amb             Prone press ups             TA w/prone hip ext             TA w/clamshells             TA w/march             TA w/kicks             TA w/bridges             LTR             Ther Activity TA w/mini squats                          Gait Training                                       Modalities

## 2021-01-25 ENCOUNTER — APPOINTMENT (OUTPATIENT)
Dept: PHYSICAL THERAPY | Facility: REHABILITATION | Age: 21
End: 2021-01-25
Payer: COMMERCIAL

## 2021-01-27 ENCOUNTER — OFFICE VISIT (OUTPATIENT)
Dept: PHYSICAL THERAPY | Facility: REHABILITATION | Age: 21
End: 2021-01-27
Payer: COMMERCIAL

## 2021-01-27 DIAGNOSIS — G89.29 ACUTE EXACERBATION OF CHRONIC LOW BACK PAIN: Primary | ICD-10-CM

## 2021-01-27 DIAGNOSIS — M54.50 ACUTE EXACERBATION OF CHRONIC LOW BACK PAIN: Primary | ICD-10-CM

## 2021-01-27 PROCEDURE — 97140 MANUAL THERAPY 1/> REGIONS: CPT | Performed by: PHYSICAL THERAPIST

## 2021-01-27 PROCEDURE — 97110 THERAPEUTIC EXERCISES: CPT | Performed by: PHYSICAL THERAPIST

## 2021-01-27 PROCEDURE — 97112 NEUROMUSCULAR REEDUCATION: CPT | Performed by: PHYSICAL THERAPIST

## 2021-01-27 NOTE — PROGRESS NOTES
Daily Note     Today's date: 2021  Patient name: Denilson Ivy  : 2000  MRN: [id number]  Referring provider: Ted Sebastian DO  Dx:   Encounter Diagnosis     ICD-10-CM    1  Acute exacerbation of chronic low back pain  M54 5     G89 29                   Subjective: Pt denies any adverse response to IE  Pt denies questions with HEP, although is not performing as often as instructed  Objective: See treatment diary below      Assessment: Tolerated treatment well  Mild lumbar soreness post TE's  Monitor response NV  Patient would benefit from continued PT      Plan: Continue per plan of care  Progress treatment as tolerated  Precautions: hx of chronic LBP, scoliosis, sleep apnea, depressions, anxiety, asthma, anemia        Manuals            S/L R lumbar gapping gr 4-5 TP 3 mins TP 3 mins gr 4-5           L3-5 CPA's gr 3-4  TP 5 mins                                     Neuro Re-Ed             TA w/TB LPD  otb 3"x15           TA w/TB rows  otb 3"x15           TA w/TB multifidus press  otb 3"x15ea                                                               Ther Ex             Tm amb  10'           Prone press ups  2x10           TA w/prone hip ext  3"x10           TA w/clamshells  3"x15           TA w/march  x10           TA w/kicks  x10           TA w/bridges  3"x10           LTR  10"x5ea           Ther Activity             TA w/mini squats                          Gait Training                                       Modalities

## 2021-02-10 NOTE — PROGRESS NOTES
Pt has no showed for her last 3 consecutive appointments and will therefore be d/c at this time due to the Attendance policy

## 2021-02-24 ENCOUNTER — CLINICAL SUPPORT (OUTPATIENT)
Dept: FAMILY MEDICINE CLINIC | Facility: CLINIC | Age: 21
End: 2021-02-24
Payer: COMMERCIAL

## 2021-02-24 DIAGNOSIS — Z11.1 ENCOUNTER FOR PPD TEST: Primary | ICD-10-CM

## 2021-02-24 PROCEDURE — 86580 TB INTRADERMAL TEST: CPT

## 2021-02-26 ENCOUNTER — CLINICAL SUPPORT (OUTPATIENT)
Dept: FAMILY MEDICINE CLINIC | Facility: CLINIC | Age: 21
End: 2021-02-26

## 2021-02-26 DIAGNOSIS — Z11.1 ENCOUNTER FOR PPD SKIN TEST READING: Primary | ICD-10-CM

## 2021-02-26 LAB
INDURATION: 0 MM
TB SKIN TEST: NEGATIVE

## 2021-03-30 ENCOUNTER — OFFICE VISIT (OUTPATIENT)
Dept: FAMILY MEDICINE CLINIC | Facility: CLINIC | Age: 21
End: 2021-03-30
Payer: COMMERCIAL

## 2021-03-30 VITALS
DIASTOLIC BLOOD PRESSURE: 78 MMHG | HEIGHT: 63 IN | RESPIRATION RATE: 18 BRPM | TEMPERATURE: 98 F | OXYGEN SATURATION: 98 % | BODY MASS INDEX: 51.91 KG/M2 | HEART RATE: 97 BPM | SYSTOLIC BLOOD PRESSURE: 136 MMHG | WEIGHT: 293 LBS

## 2021-03-30 DIAGNOSIS — E78.5 HYPERLIPIDEMIA LDL GOAL <70: ICD-10-CM

## 2021-03-30 DIAGNOSIS — G47.33 OBSTRUCTIVE SLEEP APNEA: ICD-10-CM

## 2021-03-30 DIAGNOSIS — D50.0 IRON DEFICIENCY ANEMIA DUE TO CHRONIC BLOOD LOSS: ICD-10-CM

## 2021-03-30 DIAGNOSIS — E11.9 TYPE 2 DIABETES MELLITUS WITHOUT COMPLICATION, WITHOUT LONG-TERM CURRENT USE OF INSULIN (HCC): Primary | ICD-10-CM

## 2021-03-30 DIAGNOSIS — E66.01 MORBID OBESITY WITH BMI OF 50.0-59.9, ADULT (HCC): ICD-10-CM

## 2021-03-30 DIAGNOSIS — E78.1 HYPERTRIGLYCERIDEMIA: ICD-10-CM

## 2021-03-30 LAB — SL AMB POCT HEMOGLOBIN AIC: 8.8 (ref ?–6.5)

## 2021-03-30 PROCEDURE — 4010F ACE/ARB THERAPY RXD/TAKEN: CPT | Performed by: NURSE PRACTITIONER

## 2021-03-30 PROCEDURE — 1036F TOBACCO NON-USER: CPT | Performed by: NURSE PRACTITIONER

## 2021-03-30 PROCEDURE — 3052F HG A1C>EQUAL 8.0%<EQUAL 9.0%: CPT | Performed by: NURSE PRACTITIONER

## 2021-03-30 PROCEDURE — 99214 OFFICE O/P EST MOD 30 MIN: CPT | Performed by: NURSE PRACTITIONER

## 2021-03-30 PROCEDURE — 3008F BODY MASS INDEX DOCD: CPT | Performed by: NURSE PRACTITIONER

## 2021-03-30 PROCEDURE — 83036 HEMOGLOBIN GLYCOSYLATED A1C: CPT | Performed by: NURSE PRACTITIONER

## 2021-03-30 PROCEDURE — 3725F SCREEN DEPRESSION PERFORMED: CPT | Performed by: NURSE PRACTITIONER

## 2021-03-30 RX ORDER — OMEGA-3-ACID ETHYL ESTERS 1 G/1
2 CAPSULE, LIQUID FILLED ORAL 2 TIMES DAILY
Qty: 360 CAPSULE | Refills: 1 | Status: SHIPPED | OUTPATIENT
Start: 2021-03-30 | End: 2021-11-12 | Stop reason: ALTCHOICE

## 2021-03-30 RX ORDER — FENOFIBRATE 145 MG/1
145 TABLET, COATED ORAL DAILY
Qty: 90 TABLET | Refills: 1 | Status: SHIPPED | OUTPATIENT
Start: 2021-03-30 | End: 2021-11-12 | Stop reason: ALTCHOICE

## 2021-03-30 RX ORDER — LISINOPRIL 2.5 MG/1
2.5 TABLET ORAL DAILY
Qty: 90 TABLET | Refills: 1 | Status: SHIPPED | OUTPATIENT
Start: 2021-03-30 | End: 2021-11-12 | Stop reason: SDUPTHER

## 2021-03-30 NOTE — PROGRESS NOTES
Assessment/Plan:    Type 2 diabetes mellitus without complication, without long-term current use of insulin ([facility])    Lab Results   Component Value Date    HGBA1C 8 8 (A) 03/30/2021     Uncontrolled with A1C 6 8--> 8 8  Stressed the importance of taking Metformin 1000mg BID (stop skipping meds on the weekends! ! )  Start Januvia 50 mg one tab daily- samples provided today and script sent to pharmacy   I feel she would benefit from establishing care with Endocrinology given her current medication conditions at such a young age  The patient is very resistant to this and prefers to hold off on this option for now   Reminded pt she is overdue for a diabetic eye exam (she does have an ophthalmologist)     Obstructive sleep apnea  Strongly advised pt to reschedule her evaluation with Sleep Medicine that she missed back in September of 2020  Reviewed the long term complications due to untreated HARJIT  Encouraged weight loss    Hyperlipidemia LDL goal <70  Due for updated lipid panel which I reordered today   Continue Lipitor, Fenofibrate and Lovaza   Needs to work on dietary modifications     Iron deficiency anemia due to chronic blood loss  Pt no-showed for her last iron infusion with Hematology this past summer  Starting to have cravings of raw rice again  CBC and iron panel ordered- likely needs to see Hematology again   Continue oral ferrous sulfate for now    Morbid obesity with BMI of 50 0-59 9, adult ([facility] )  Pt has seen Bariatrics (last February of 2020) but has not followed up with that office    I strongly encouraged her to reconsider this, current BMI at 58 10  Declined a referral to a Dietician/ Nutritionist today- states she has "done this before and knows what she has to do"  Her boyfriend who she lives with has similar eating habits        Diagnoses and all orders for this visit:    Type 2 diabetes mellitus without complication, without long-term current use of insulin ([facility] )  -     POCT hemoglobin A1c  - metFORMIN (GLUCOPHAGE) 1000 MG tablet; Take 1 tablet (1,000 mg total) by mouth 2 (two) times a day with meals  -     lisinopril (ZESTRIL) 2 5 mg tablet; Take 1 tablet (2 5 mg total) by mouth daily  -     CBC and differential; Future  -     Comprehensive metabolic panel; Future  -     sitaGLIPtin (JANUVIA) 50 mg tablet; Take 1 tablet (50 mg total) by mouth daily    Obstructive sleep apnea  -     CBC and differential; Future  -     Comprehensive metabolic panel; Future    Hyperlipidemia LDL goal <70  -     lovastatin (ALTOPREV) 40 MG 24 hr tablet; Take 1 tablet (40 mg total) by mouth daily at bedtime  -     CBC and differential; Future  -     Comprehensive metabolic panel; Future  -     TSH, 3rd generation with Free T4 reflex; Future  -     Lipid panel; Future    Morbid obesity with BMI of 50 0-59 9, adult (HCC)  -     CBC and differential; Future  -     Comprehensive metabolic panel; Future  -     TSH, 3rd generation with Free T4 reflex; Future  -     Iron Panel (Includes Ferritin, Iron Sat%, Iron, and TIBC); Future    Iron deficiency anemia due to chronic blood loss  -     CBC and differential; Future  -     Comprehensive metabolic panel; Future    Hypertriglyceridemia  -     omega-3-acid ethyl esters (LOVAZA) 1 g capsule; Take 2 capsules (2 g total) by mouth 2 (two) times a day  -     fenofibrate (TRICOR) 145 mg tablet; Take 1 tablet (145 mg total) by mouth daily  -     CBC and differential; Future  -     Comprehensive metabolic panel; Future          Subjective:      Patient ID: Caren Maria is a 24 y o  female      HPI     Pt presents by herself today for a routine follow up  Last seen here by Dr Alyson Morgan, 6/18/2020    Pt with DMT2- A1C 6 8--> 8 8   She estimates taking Metformin 1000mg BID 5 times weekly, often skips all medications on weekends due to having a more lax schedule   She is overdue for a diabetic eye exam (last over a year ago) but does have an ophthalmologist   Pt admits to snacking frequently rather than eating 3 meals per day, often craves junk food, eats fast food regularly     HTN- on Lisinopril 2 5 mg daily- it appears this was started more for renal protection with her DM rather than elevated BP   Pt does not check BP at home, diet is high in sodium     HLD- taking Atorvastatin 20 mg daily, Fenofibrate 145 mg daily and Lovaza 2 g BID (also skips these meds on weekends)   She unfortunately, did not have blood work completed for today's office visit  Lipid panel from     HARJIT- does not use a CPAP machine  She missed her Sleep Medicine evaluation in September  Pt comments she "is bad with keeping appointments"     BMI at 58 10- no exercise, diet consists mostly of junk food and fast food  She is considering seeing Dr Joseph Zapata for weight management     DUSTIN- was previously following with Hematology for iron infusions  Pt no-showed for her last scheduled infusion 7/13/2020  Pt notes her DUSTIN is secondary to menorrhagia and she is seeing a GYN specialists for this but continues with menorrhagia   She has been craving raw rice which she previously craved when deficient   Denies SOB, dizziness, fatigue, weakness     The following portions of the patient's history were reviewed and updated as appropriate: allergies, current medications, past family history, past medical history, past social history, past surgical history and problem list     Review of Systems   Constitutional: Negative for chills and fever  HENT: Negative for ear pain and sore throat  Eyes: Negative for pain and visual disturbance  Respiratory: Negative for cough and shortness of breath  Cardiovascular: Negative for chest pain, palpitations and leg swelling  Gastrointestinal: Negative for abdominal pain and vomiting  Genitourinary: Negative for dysuria and hematuria  Musculoskeletal: Positive for arthralgias  Negative for back pain  Skin: Negative for color change and rash     Neurological: Negative for seizures and syncope  Hematological: Negative for adenopathy  Does not bruise/bleed easily  Psychiatric/Behavioral: Negative for sleep disturbance and suicidal ideas  All other systems reviewed and are negative  Objective:      /78 (BP Location: Left arm, Patient Position: Sitting, Cuff Size: Adult)   Pulse 97   Temp 98 °F (36 7 °C) (Tympanic)   Resp 18   Ht 5' 3" (1 6 m)   Wt (!) 149 kg (328 lb)   SpO2 98%   BMI 58 10 kg/m²          Physical Exam  Constitutional:       General: She is not in acute distress  Appearance: She is well-developed  She is obese  She is not ill-appearing, toxic-appearing or diaphoretic  HENT:      Head: Normocephalic and atraumatic  Eyes:      Extraocular Movements: Extraocular movements intact  Conjunctiva/sclera: Conjunctivae normal       Pupils: Pupils are equal, round, and reactive to light  Neck:      Musculoskeletal: Normal range of motion and neck supple  No neck rigidity or muscular tenderness  Thyroid: No thyromegaly  Vascular: No carotid bruit  Cardiovascular:      Rate and Rhythm: Normal rate and regular rhythm  Pulses: no weak pulses          Dorsalis pedis pulses are 2+ on the right side and 2+ on the left side  Heart sounds: Normal heart sounds  No murmur  Pulmonary:      Effort: Pulmonary effort is normal  No respiratory distress  Breath sounds: Normal breath sounds  No wheezing  Abdominal:      General: Bowel sounds are normal  There is no distension  Palpations: Abdomen is soft  Tenderness: There is no abdominal tenderness  Musculoskeletal: Normal range of motion  Feet:      Right foot:      Skin integrity: No ulcer, skin breakdown, erythema, warmth, callus or dry skin  Left foot:      Skin integrity: No ulcer, skin breakdown, erythema, warmth, callus or dry skin  Lymphadenopathy:      Cervical: No cervical adenopathy  Skin:     General: Skin is warm and dry     Neurological:      General: No focal deficit present  Mental Status: She is alert and oriented to person, place, and time  Psychiatric:         Mood and Affect: Mood normal          Behavior: Behavior normal          Thought Content: Thought content normal          Judgment: Judgment normal        Patient's shoes and socks removed  Right Foot/Ankle   Right Foot Inspection  Skin Exam: skin normal and skin intact no dry skin, no warmth, no callus, no erythema, no maceration, no abnormal color, no pre-ulcer, no ulcer and no callus                          Toe Exam: ROM and strength within normal limits  Sensory       Monofilament testing: intact  Vascular  Capillary refills: < 3 seconds  The right DP pulse is 2+  Left Foot/Ankle  Left Foot Inspection  Skin Exam: skin normal and skin intactno dry skin, no warmth, no erythema, no maceration, normal color, no pre-ulcer, no ulcer and no callus                         Toe Exam: ROM and strength within normal limits                   Sensory       Monofilament: intact  Vascular  Capillary refills: < 3 seconds  The left DP pulse is 2+  Assign Risk Category:  No deformity present; No loss of protective sensation; No weak pulses       Risk: 0    BMI Counseling: Body mass index is 58 1 kg/m²  The BMI is above normal  Nutrition recommendations include decreasing portion sizes, encouraging healthy choices of fruits and vegetables, decreasing fast food intake, consuming healthier snacks, limiting drinks that contain sugar, moderation in carbohydrate intake, increasing intake of lean protein, reducing intake of saturated and trans fat and reducing intake of cholesterol  Exercise recommendations include moderate physical activity 150 minutes/week   Pt declined a referral to a Dietician/ Nutritionist  Also declined a referral to our  Weight Management team

## 2021-03-30 NOTE — ASSESSMENT & PLAN NOTE
Pt no-showed for her last iron infusion with Hematology this past summer  Starting to have cravings of raw rice again  CBC and iron panel ordered- likely needs to see Hematology again   Continue oral ferrous sulfate for now

## 2021-03-30 NOTE — ASSESSMENT & PLAN NOTE
Due for updated lipid panel which I reordered today   Continue Lipitor, Fenofibrate and Lovaza   Needs to work on dietary modifications

## 2021-03-30 NOTE — ASSESSMENT & PLAN NOTE
Pt has seen Bariatrics (last February of 2020) but has not followed up with that office    I strongly encouraged her to reconsider this, current BMI at 58 10  Declined a referral to a Dietician/ Nutritionist today- states she has "done this before and knows what she has to do"  Her boyfriend who she lives with has similar eating habits

## 2021-03-30 NOTE — ASSESSMENT & PLAN NOTE
Strongly advised pt to reschedule her evaluation with Sleep Medicine that she missed back in September of 2020  Reviewed the long term complications due to untreated HARJIT  Encouraged weight loss

## 2021-03-30 NOTE — ASSESSMENT & PLAN NOTE
Lab Results   Component Value Date    HGBA1C 8 8 (A) 03/30/2021     Uncontrolled with A1C 6 8--> 8 8  Stressed the importance of taking Metformin 1000mg BID (stop skipping meds on the weekends! ! )  Start Januvia 50 mg one tab daily- samples provided today and script sent to pharmacy   I feel she would benefit from establishing care with Endocrinology given her current medication conditions at such a young age    The patient is very resistant to this and prefers to hold off on this option for now   Reminded pt she is overdue for a diabetic eye exam (she does have an ophthalmologist)

## 2021-03-31 ENCOUNTER — LAB (OUTPATIENT)
Dept: LAB | Facility: HOSPITAL | Age: 21
End: 2021-03-31
Attending: INTERNAL MEDICINE
Payer: COMMERCIAL

## 2021-03-31 DIAGNOSIS — E11.9 TYPE 2 DIABETES MELLITUS WITHOUT COMPLICATION, WITHOUT LONG-TERM CURRENT USE OF INSULIN (HCC): ICD-10-CM

## 2021-03-31 DIAGNOSIS — E66.01 MORBID OBESITY WITH BMI OF 50.0-59.9, ADULT (HCC): ICD-10-CM

## 2021-03-31 DIAGNOSIS — E78.5 HYPERLIPIDEMIA LDL GOAL <70: ICD-10-CM

## 2021-03-31 DIAGNOSIS — G47.33 OBSTRUCTIVE SLEEP APNEA: ICD-10-CM

## 2021-03-31 DIAGNOSIS — E78.1 HYPERTRIGLYCERIDEMIA: ICD-10-CM

## 2021-03-31 DIAGNOSIS — D50.0 IRON DEFICIENCY ANEMIA DUE TO CHRONIC BLOOD LOSS: ICD-10-CM

## 2021-03-31 LAB
ALBUMIN SERPL BCP-MCNC: 3.7 G/DL (ref 3.5–5)
ALP SERPL-CCNC: 104 U/L (ref 46–116)
ALT SERPL W P-5'-P-CCNC: 194 U/L (ref 12–78)
ANION GAP SERPL CALCULATED.3IONS-SCNC: 4 MMOL/L (ref 4–13)
AST SERPL W P-5'-P-CCNC: 176 U/L (ref 5–45)
BASOPHILS # BLD AUTO: 0.04 THOUSANDS/ΜL (ref 0–0.1)
BASOPHILS NFR BLD AUTO: 0 % (ref 0–1)
BILIRUB SERPL-MCNC: 0.42 MG/DL (ref 0.2–1)
BUN SERPL-MCNC: 6 MG/DL (ref 5–25)
CALCIUM SERPL-MCNC: 9.3 MG/DL (ref 8.3–10.1)
CHLORIDE SERPL-SCNC: 106 MMOL/L (ref 100–108)
CHOLEST SERPL-MCNC: 202 MG/DL (ref 50–200)
CO2 SERPL-SCNC: 26 MMOL/L (ref 21–32)
CREAT SERPL-MCNC: 0.63 MG/DL (ref 0.6–1.3)
EOSINOPHIL # BLD AUTO: 0.39 THOUSAND/ΜL (ref 0–0.61)
EOSINOPHIL NFR BLD AUTO: 4 % (ref 0–6)
ERYTHROCYTE [DISTWIDTH] IN BLOOD BY AUTOMATED COUNT: 15.6 % (ref 11.6–15.1)
FERRITIN SERPL-MCNC: 48 NG/ML (ref 8–388)
GFR SERPL CREATININE-BSD FRML MDRD: 129 ML/MIN/1.73SQ M
GLUCOSE P FAST SERPL-MCNC: 177 MG/DL (ref 65–99)
HCT VFR BLD AUTO: 36.1 % (ref 34.8–46.1)
HDLC SERPL-MCNC: 29 MG/DL
HGB BLD-MCNC: 11.3 G/DL (ref 11.5–15.4)
IMM GRANULOCYTES # BLD AUTO: 0.04 THOUSAND/UL (ref 0–0.2)
IMM GRANULOCYTES NFR BLD AUTO: 0 % (ref 0–2)
IRON SATN MFR SERPL: 8 %
IRON SERPL-MCNC: 40 UG/DL (ref 50–170)
LDLC SERPL CALC-MCNC: 126 MG/DL (ref 0–100)
LYMPHOCYTES # BLD AUTO: 3.21 THOUSANDS/ΜL (ref 0.6–4.47)
LYMPHOCYTES NFR BLD AUTO: 34 % (ref 14–44)
MCH RBC QN AUTO: 24.5 PG (ref 26.8–34.3)
MCHC RBC AUTO-ENTMCNC: 31.3 G/DL (ref 31.4–37.4)
MCV RBC AUTO: 78 FL (ref 82–98)
MONOCYTES # BLD AUTO: 0.37 THOUSAND/ΜL (ref 0.17–1.22)
MONOCYTES NFR BLD AUTO: 4 % (ref 4–12)
NEUTROPHILS # BLD AUTO: 5.5 THOUSANDS/ΜL (ref 1.85–7.62)
NEUTS SEG NFR BLD AUTO: 58 % (ref 43–75)
NONHDLC SERPL-MCNC: 173 MG/DL
NRBC BLD AUTO-RTO: 0 /100 WBCS
PLATELET # BLD AUTO: 290 THOUSANDS/UL (ref 149–390)
PMV BLD AUTO: 12.4 FL (ref 8.9–12.7)
POTASSIUM SERPL-SCNC: 3.6 MMOL/L (ref 3.5–5.3)
PROT SERPL-MCNC: 8.4 G/DL (ref 6.4–8.2)
RBC # BLD AUTO: 4.61 MILLION/UL (ref 3.81–5.12)
SODIUM SERPL-SCNC: 136 MMOL/L (ref 136–145)
T4 FREE SERPL-MCNC: 1.36 NG/DL (ref 0.76–1.46)
TIBC SERPL-MCNC: 514 UG/DL (ref 250–450)
TRIGL SERPL-MCNC: 235 MG/DL
TSH SERPL DL<=0.05 MIU/L-ACNC: 3.77 UIU/ML (ref 0.36–3.74)
WBC # BLD AUTO: 9.55 THOUSAND/UL (ref 4.31–10.16)

## 2021-03-31 PROCEDURE — 84439 ASSAY OF FREE THYROXINE: CPT

## 2021-03-31 PROCEDURE — 83540 ASSAY OF IRON: CPT

## 2021-03-31 PROCEDURE — 80053 COMPREHEN METABOLIC PANEL: CPT

## 2021-03-31 PROCEDURE — 80061 LIPID PANEL: CPT

## 2021-03-31 PROCEDURE — 84443 ASSAY THYROID STIM HORMONE: CPT

## 2021-03-31 PROCEDURE — 36415 COLL VENOUS BLD VENIPUNCTURE: CPT

## 2021-03-31 PROCEDURE — 82728 ASSAY OF FERRITIN: CPT

## 2021-03-31 PROCEDURE — 83550 IRON BINDING TEST: CPT

## 2021-03-31 PROCEDURE — 85025 COMPLETE CBC W/AUTO DIFF WBC: CPT

## 2021-04-06 ENCOUNTER — TELEPHONE (OUTPATIENT)
Dept: HEMATOLOGY ONCOLOGY | Facility: CLINIC | Age: 21
End: 2021-04-06

## 2021-04-07 ENCOUNTER — TELEPHONE (OUTPATIENT)
Dept: GYNECOLOGY | Facility: CLINIC | Age: 21
End: 2021-04-07

## 2021-04-07 ENCOUNTER — TELEPHONE (OUTPATIENT)
Dept: HEMATOLOGY ONCOLOGY | Facility: CLINIC | Age: 21
End: 2021-04-07

## 2021-04-07 ENCOUNTER — ANNUAL EXAM (OUTPATIENT)
Dept: GYNECOLOGY | Facility: CLINIC | Age: 21
End: 2021-04-07
Payer: COMMERCIAL

## 2021-04-07 VITALS
HEIGHT: 63 IN | DIASTOLIC BLOOD PRESSURE: 74 MMHG | WEIGHT: 293 LBS | HEART RATE: 95 BPM | SYSTOLIC BLOOD PRESSURE: 118 MMHG | BODY MASS INDEX: 51.91 KG/M2

## 2021-04-07 DIAGNOSIS — N92.1 MENORRHAGIA WITH IRREGULAR CYCLE: ICD-10-CM

## 2021-04-07 DIAGNOSIS — B37.9 CANDIDIASIS: ICD-10-CM

## 2021-04-07 DIAGNOSIS — Z01.411 ENCOUNTER FOR GYNECOLOGICAL EXAMINATION WITH ABNORMAL FINDING: ICD-10-CM

## 2021-04-07 DIAGNOSIS — E66.01 MORBID OBESITY WITH BMI OF 50.0-59.9, ADULT (HCC): ICD-10-CM

## 2021-04-07 DIAGNOSIS — Z11.3 SCREENING FOR STDS (SEXUALLY TRANSMITTED DISEASES): ICD-10-CM

## 2021-04-07 DIAGNOSIS — N93.9 ABNORMAL UTERINE BLEEDING (AUB): ICD-10-CM

## 2021-04-07 DIAGNOSIS — Z01.419 ENCOUNTER FOR GYNECOLOGICAL EXAMINATION WITH PAPANICOLAOU SMEAR OF CERVIX: Primary | ICD-10-CM

## 2021-04-07 PROCEDURE — 99395 PREV VISIT EST AGE 18-39: CPT | Performed by: OBSTETRICS & GYNECOLOGY

## 2021-04-07 PROCEDURE — G0145 SCR C/V CYTO,THINLAYER,RESCR: HCPCS | Performed by: PATHOLOGY

## 2021-04-07 PROCEDURE — 87591 N.GONORRHOEAE DNA AMP PROB: CPT | Performed by: OBSTETRICS & GYNECOLOGY

## 2021-04-07 PROCEDURE — 87491 CHLMYD TRACH DNA AMP PROBE: CPT | Performed by: OBSTETRICS & GYNECOLOGY

## 2021-04-07 PROCEDURE — 87624 HPV HI-RISK TYP POOLED RSLT: CPT | Performed by: OBSTETRICS & GYNECOLOGY

## 2021-04-07 PROCEDURE — 99212 OFFICE O/P EST SF 10 MIN: CPT | Performed by: OBSTETRICS & GYNECOLOGY

## 2021-04-07 PROCEDURE — G0124 SCREEN C/V THIN LAYER BY MD: HCPCS | Performed by: PATHOLOGY

## 2021-04-07 RX ORDER — ETONOGESTREL AND ETHINYL ESTRADIOL 11.7; 2.7 MG/1; MG/1
INSERT, EXTENDED RELEASE VAGINAL
Qty: 3 EACH | Refills: 3 | Status: SHIPPED | OUTPATIENT
Start: 2021-04-07

## 2021-04-07 NOTE — PROGRESS NOTES
Assessment/Plan:    No problem-specific Assessment & Plan notes found for this encounter  Diagnoses and all orders for this visit:    Encounter for gynecological examination with Papanicolaou smear of cervix  -     Liquid-based pap, screening    Screening for STDs (sexually transmitted diseases)  -     Chlamydia/GC amplified DNA by PCR    Abnormal uterine bleeding (AUB)    Encounter for gynecological examination with abnormal finding    Morbid obesity with BMI of 50 0-59 9, adult (HCC)    Candidiasis  -     terconazole (TERAZOL 7) 0 4 % vaginal cream; Insert 1 applicator into the vagina daily at bedtime    Menorrhagia with irregular cycle: RTO TVS-SIS  -     etonogestrel-ethinyl estradiol (NUVARING) 0 12-0 015 MG/24HR vaginal ring; Insert vaginally and leave in place for 3 consecutive weeks, then remove for 1 week  Ten additional minutes addressing AUB and candidiasis    Subjective:      Patient ID: Alex Romero is a 24 y o  female  HPI  G 0 presents for annual examination  In June of 2020 she was seen in the office as a referral Dr Dali Valdez secondary to iron deficiency anemia due to chronic blood loss  Patient stated that for the year or 2 prior to that visit she has been experiencing heavy and irregular menses  Hemoglobin at that time was 6 8  She had tried oral contraceptives in the past but that did not control her bleeding she also had a progesterone IUD inserted which was spontaneously expelled  Patient declined Depo-Provera  She wanted to retry the NuvaRing which did will menorrhagia in the past   NuvaRing was restarted  At that time she was recommended she return to the office for T VS-SIS possible endometrial biopsy  She never followed through with this  However, since then  her menses have been regular up until this past month  This past month she had nearly daily spotting  Her last hemoglobin was March 31st and was 11 3      Patient also presents complaining of vaginal irritation especially post coital   She denies any vaginal discharge fever abdominal or pelvic pain denies any dysuria, hematuria urgency or urinary incontinence  The following portions of the patient's history were reviewed and updated as appropriate:   She  has a past medical history of Anemia, Anxiety, Asthma, Chronic headache, Depression, Elevated LFTs, High cholesterol, Hyperlipidemia, Pre-diabetes, Scoliosis, Self-injurious behavior, and Sleep apnea  She   Patient Active Problem List    Diagnosis Date Noted    Plantar fasciitis 06/18/2020    Anxiety 02/18/2020    Type 2 diabetes mellitus without complication, without long-term current use of insulin ([facility] ) 02/04/2020    Hyperlipidemia LDL goal <70 02/04/2020    Elevated LFTs 11/07/2019    Yeast dermatitis 11/07/2019    Migraine without aura and without status migrainosus, not intractable 08/16/2019    Iron deficiency anemia due to chronic blood loss 08/08/2019    Low vitamin B12 level 08/08/2019    Abnormal uterine bleeding 02/26/2018    Abscess of right breast 07/25/2017    Hypertriglyceridemia 11/21/2016    Dextroscoliosis 04/05/2016    Current moderate episode of major depressive disorder ([facility] ) 09/29/2015    Chronic headaches 09/29/2015    Morbid obesity with BMI of 50 0-59 9, adult ([place] [id number] ) 05/04/2015    Obstructive sleep apnea 04/07/2014    Vitamin D deficiency 08/26/2013     She  has a past surgical history that includes Tonsillectomy; Breast incision and drainage (Right, 7/25/2017); Tonsillectomy (2016); and Breast surgery (2017)  Her family history includes Allergies in her family; Anemia in her sister, sister, and sister; Aneurysm in her paternal grandfather; Anxiety disorder in her sister; Appendicitis in her maternal grandmother; Depression in her mother and sister; Diabetes in her family and mother; Hyperlipidemia in her mother; Hypertension in her mother; Obesity in her family; Prostate cancer in her paternal uncle;  Thyroid disease in her sister  She  reports that she has never smoked  She has never used smokeless tobacco  She reports current drug use  She reports that she does not drink alcohol  Current Outpatient Medications   Medication Sig Dispense Refill    albuterol (VENTOLIN HFA) 90 mcg/act inhaler Inhale 2 puffs every 6 (six) hours as needed for wheezing 18 g 0    ascorbic acid (VITAMIN C) 250 mg tablet Take 1 tablet (250 mg total) by mouth daily (Patient not taking: Reported on 3/30/2021) 180 tablet 1    atorvastatin (LIPITOR) 20 mg tablet Take 1 tablet (20 mg total) by mouth daily 90 tablet 1    [employer] D3 125 MCG (5000 UT) capsule TAKE 1 CAPSULE (5,000 UNITS TOTAL) BY MOUTH DAILY (Patient not taking: Reported on 3/30/2021) 90 capsule 1    cyanocobalamin (VITAMIN B-12) 1000 MCG tablet Take 1 tablet (1,000 mcg total) by mouth daily (Patient not taking: Reported on 3/30/2021) 90 tablet 0    cyclobenzaprine (FLEXERIL) 10 mg tablet Take 1 tablet (10 mg total) by mouth 2 (two) times a day as needed for muscle spasms (Patient not taking: Reported on 3/30/2021) 20 tablet 0    escitalopram (LEXAPRO) 5 mg tablet Take 1 tablet (5 mg total) by mouth daily 30 tablet 2    etonogestrel-ethinyl estradiol (NUVARING) 0 12-0 015 MG/24HR vaginal ring Insert vaginally and leave in place for 3 consecutive weeks, then remove for 1 week   3 each 3    fenofibrate (TRICOR) 145 mg tablet Take 1 tablet (145 mg total) by mouth daily 90 tablet 1    ferrous sulfate 325 (65 Fe) mg tablet Take 1 tablet (325 mg total) by mouth 2 (two) times a day with meals 180 tablet 1    lisinopril (ZESTRIL) 2 5 mg tablet Take 1 tablet (2 5 mg total) by mouth daily 90 tablet 1    lovastatin (ALTOPREV) 40 MG 24 hr tablet Take 1 tablet (40 mg total) by mouth daily at bedtime 90 tablet 1    medroxyPROGESTERone (PROVERA) 10 mg tablet Take 1 tablet (10 mg total) by mouth daily for 10 days (Patient not taking: Reported on 2/18/2020) 10 tablet 0    metFORMIN (GLUCOPHAGE) 1000 MG tablet Take 1 tablet (1,000 mg total) by mouth 2 (two) times a day with meals 180 tablet 1    omega-3-acid ethyl esters (LOVAZA) 1 g capsule Take 2 capsules (2 g total) by mouth 2 (two) times a day 360 capsule 1    sitaGLIPtin (JANUVIA) 50 mg tablet Take 1 tablet (50 mg total) by mouth daily 30 tablet 3    terconazole (TERAZOL 7) 0 4 % vaginal cream Insert 1 applicator into the vagina daily at bedtime 45 g 0     No current facility-administered medications for this visit        Current Outpatient Medications on File Prior to Visit   Medication Sig    albuterol (VENTOLIN HFA) 90 mcg/act inhaler Inhale 2 puffs every 6 (six) hours as needed for wheezing    ascorbic acid (VITAMIN C) 250 mg tablet Take 1 tablet (250 mg total) by mouth daily (Patient not taking: Reported on 3/30/2021)    atorvastatin (LIPITOR) 20 mg tablet Take 1 tablet (20 mg total) by mouth daily    [employer] D3 125 MCG (5000 UT) capsule TAKE 1 CAPSULE (5,000 UNITS TOTAL) BY MOUTH DAILY (Patient not taking: Reported on 3/30/2021)    cyanocobalamin (VITAMIN B-12) 1000 MCG tablet Take 1 tablet (1,000 mcg total) by mouth daily (Patient not taking: Reported on 3/30/2021)    cyclobenzaprine (FLEXERIL) 10 mg tablet Take 1 tablet (10 mg total) by mouth 2 (two) times a day as needed for muscle spasms (Patient not taking: Reported on 3/30/2021)    escitalopram (LEXAPRO) 5 mg tablet Take 1 tablet (5 mg total) by mouth daily    fenofibrate (TRICOR) 145 mg tablet Take 1 tablet (145 mg total) by mouth daily    ferrous sulfate 325 (65 Fe) mg tablet Take 1 tablet (325 mg total) by mouth 2 (two) times a day with meals    lisinopril (ZESTRIL) 2 5 mg tablet Take 1 tablet (2 5 mg total) by mouth daily    lovastatin (ALTOPREV) 40 MG 24 hr tablet Take 1 tablet (40 mg total) by mouth daily at bedtime    medroxyPROGESTERone (PROVERA) 10 mg tablet Take 1 tablet (10 mg total) by mouth daily for 10 days (Patient not taking: Reported on 2/18/2020)    metFORMIN (GLUCOPHAGE) 1000 MG tablet Take 1 tablet (1,000 mg total) by mouth 2 (two) times a day with meals    omega-3-acid ethyl esters (LOVAZA) 1 g capsule Take 2 capsules (2 g total) by mouth 2 (two) times a day    sitaGLIPtin (JANUVIA) 50 mg tablet Take 1 tablet (50 mg total) by mouth daily    [DISCONTINUED] etonogestrel-ethinyl estradiol (NUVARING) 0 12-0 015 MG/24HR vaginal ring Insert vaginally and leave in place for 3 consecutive weeks, then remove for 1 week  No current facility-administered medications on file prior to visit  She has No Known Allergies       Review of Systems   Constitutional: Negative  HENT: Negative for sore throat and trouble swallowing  Gastrointestinal: Negative  Genitourinary: Positive for menstrual problem  Negative for difficulty urinating, dyspareunia, dysuria, enuresis, flank pain, frequency, hematuria, pelvic pain and vaginal discharge  Objective:      /74 (BP Location: Left arm, Patient Position: Sitting, Cuff Size: Large)   Pulse 95   Ht 5' 3" (1 6 m)   Wt (!) 150 kg (330 lb)   LMP 02/07/2021   BMI 58 46 kg/m²          Physical Exam  Constitutional:       Appearance: She is obese  Neck:      Musculoskeletal: Normal range of motion and neck supple  No muscular tenderness  Cardiovascular:      Rate and Rhythm: Normal rate and regular rhythm  Pulses: Normal pulses  Heart sounds: Normal heart sounds  No murmur  Pulmonary:      Effort: Pulmonary effort is normal  No respiratory distress  Breath sounds: Normal breath sounds  Chest:      Breasts:         Right: No swelling, bleeding, inverted nipple, mass, nipple discharge, skin change or tenderness  Left: No swelling, bleeding, inverted nipple, mass, nipple discharge, skin change or tenderness  Abdominal:      General: There is no distension  Palpations: Abdomen is soft  There is no mass  Tenderness: There is no abdominal tenderness   There is no guarding or rebound  Hernia: No hernia is present  There is no hernia in the left inguinal area or right inguinal area  Genitourinary:     General: Normal vulva  Labia:         Right: No rash, tenderness or lesion  Left: No rash, tenderness or lesion  Vagina: Vaginal discharge present  Cervix: Normal       Uterus: Normal        Adnexa:         Right: No mass, tenderness or fullness  Left: No mass, tenderness or fullness  Lymphadenopathy:      Cervical: No cervical adenopathy  Upper Body:      Right upper body: No supraclavicular, axillary or pectoral adenopathy  Left upper body: No supraclavicular, axillary or pectoral adenopathy  Lower Body: No right inguinal adenopathy  No left inguinal adenopathy  Neurological:      Mental Status: She is alert

## 2021-04-07 NOTE — TELEPHONE ENCOUNTER
Christina Edmondson called and states She contacted her insurance company about tvs/sis procedure  Pt states insurance company told her they do not take those codes  Spoke to Meg Roach  To call back and talk to someone else

## 2021-04-09 LAB
C TRACH DNA SPEC QL NAA+PROBE: NEGATIVE
N GONORRHOEA DNA SPEC QL NAA+PROBE: NEGATIVE

## 2021-04-13 LAB
LAB AP GYN PRIMARY INTERPRETATION: ABNORMAL
Lab: ABNORMAL
PATH INTERP SPEC-IMP: ABNORMAL

## 2021-04-14 ENCOUNTER — ULTRASOUND (OUTPATIENT)
Dept: GYNECOLOGY | Facility: CLINIC | Age: 21
End: 2021-04-14
Payer: COMMERCIAL

## 2021-04-14 ENCOUNTER — OFFICE VISIT (OUTPATIENT)
Dept: GYNECOLOGY | Facility: CLINIC | Age: 21
End: 2021-04-14
Payer: COMMERCIAL

## 2021-04-14 DIAGNOSIS — Z01.419 ENCOUNTER FOR GYNECOLOGICAL EXAMINATION WITH PAPANICOLAOU SMEAR OF CERVIX: ICD-10-CM

## 2021-04-14 DIAGNOSIS — E28.2 PCOS (POLYCYSTIC OVARIAN SYNDROME): ICD-10-CM

## 2021-04-14 DIAGNOSIS — N93.9 ABNORMAL UTERINE BLEEDING (AUB): Primary | ICD-10-CM

## 2021-04-14 LAB — SL AMB POCT URINE HCG: NORMAL

## 2021-04-14 PROCEDURE — 76830 TRANSVAGINAL US NON-OB: CPT | Performed by: OBSTETRICS & GYNECOLOGY

## 2021-04-14 PROCEDURE — 58100 BIOPSY OF UTERUS LINING: CPT | Performed by: OBSTETRICS & GYNECOLOGY

## 2021-04-14 PROCEDURE — 88305 TISSUE EXAM BY PATHOLOGIST: CPT | Performed by: PATHOLOGY

## 2021-04-14 PROCEDURE — 76831 ECHO EXAM UTERUS: CPT | Performed by: OBSTETRICS & GYNECOLOGY

## 2021-04-14 PROCEDURE — 81025 URINE PREGNANCY TEST: CPT | Performed by: OBSTETRICS & GYNECOLOGY

## 2021-04-14 PROCEDURE — 58340 CATHETER FOR HYSTEROGRAPHY: CPT | Performed by: OBSTETRICS & GYNECOLOGY

## 2021-04-14 NOTE — PROGRESS NOTES
G 1 presented for annual examination 4/7/21  In June of 2020 she was seen in the office as a referral Dr Sheldon Sam secondary to iron deficiency anemia due to chronic blood loss  Patient stated that for the year or 2 prior to that visit she has been experiencing heavy and irregular menses  Hemoglobin at that time was 6 8  She had tried oral contraceptives in the past but that did not control her bleeding she also had a progesterone IUD inserted which was spontaneously expelled  Patient declined Depo-Provera  She wanted to retry the NuvaRing which did will menorrhagia in the past   NuvaRing was restarted  At that time she was recommended she return to the office for T VS-SIS possible endometrial biopsy  She never followed through with this  However, since then  her menses have been regular up until this past month  This past month she had nearly daily spotting  Her last hemoglobin was March 31st and was 11 3  The bleeding has now stopped  Advised to RTO TVS-SIS poss biopsy  AMB US Pelvic Non OB   Date/Time: 4/14/2021 6:56 AM  Performed by: Sandoval Pederson  Authorized by: Rosendo Tsang DO   [place] Protocol:  Patient identity confirmed: verbally with patient        Procedure details:     Indications: non-obstetric vaginal bleeding      Technique:  Transvaginal US, Non-OB    Position: lithotomy exam    Uterine findings:     Length (cm): 7 64    Height (cm):  3 86    Width (cm):  5 48    Endometrial stripe: identified      Endometrium thickness (mm):  8 5  Left ovary findings:     Left ovary:  Visualized    Length (cm): 5 66    Height (cm): 2 72    Width (cm): 3 44  Right ovary findings:     Right ovary:  Visualized    Length (cm): 5 4    Height (cm): 2 87    Width (cm): 2 87  Other findings:     Free pelvic fluid: not identified      Free peritoneal fluid: not identified    Post-Procedure Details:     Impression:  Anteverted uterus appears within normal limits   The bilateral ovaries meet the sonographic criteria for PCOS without additional cysts or masses  No free fluid  Tolerance: Tolerated well, no immediate complications    Complications: no complications    Additional Procedure Comments:      GE Logiq P5 transvaginal transducer E8C with Serial Number [id number] was used during procedure and subsequently cleaned with high level disinfection utilizing the [employer]       Ultrasound performed at:   [place] [address] for [facility] 126  [address]  [address] [facility], [address]   Phone: [phone]  Fax:  [phone]     Sonohysterogram   Date/Time: 4/14/2021 6:57 AM  Performed by: Alan Nelson DO  Authorized by: Alan Nelson DO   Universal Protocol:  Patient identity confirmed: verbally with patient        Pre-procedure:     Prepped with: Betadine    Procedure:     Cervix cleaned and prepped: yes      Catheter inserted: yes      Uterine cavity distended with saline: yes    Post-procedure:     Patient observed: yes      Post procedure instructions given to patient: yes      Patient tolerated procedure well with no complications: yes    Comments:      Sonohysterogram demonstrates a smooth appearing endometrium  Endometrial biopsy   Date/Time: 4/14/2021 6:57 AM  Performed by: Alan Nelson DO  Authorized by: Alan Nelson DO   Universal Protocol:  Patient identity confirmed: verbally with patient        Indication:     Indications: Other disorder of menstruation and other abnormal bleeding from female genital tract    Procedure:     Procedure: endometrial biopsy with Pipelle      A bivalve speculum was placed in the vagina: yes      Cervix cleaned and prepped: yes      Specimen collected: specimen collected and sent to pathology      Patient tolerated procedure well with no complications: yes      Impression: Abnormal uterine bleeding/PCOS  Plan:  T VS results reviewed with patient along with finding PCOS    Patient states that she was diagnosed with PCOS when she was not approved  Endometrial biopsy results pending saline infusion revealed no submucosal polyps or fibroids  Since the bleeding has stopped plan is to continue to follow  If AUB reoccurs we did discuss other options including trial of oral contraceptives versus Depo-Provera

## 2021-04-14 NOTE — PROGRESS NOTES
AMB US Pelvic Non OB    Date/Time: 4/14/2021 6:56 AM  Performed by: Army Lord  Authorized by: Maria Victoria Marina DO   Universal Protocol:  Patient identity confirmed: verbally with patient      Procedure details:     Indications: non-obstetric vaginal bleeding      Technique:  Transvaginal US, Non-OB    Position: lithotomy exam    Uterine findings:     Length (cm): 7 64    Height (cm):  3 86    Width (cm):  5 48    Endometrial stripe: identified      Endometrium thickness (mm):  8 5  Left ovary findings:     Left ovary:  Visualized    Length (cm): 5 66    Height (cm): 2 72    Width (cm): 3 44  Right ovary findings:     Right ovary:  Visualized    Length (cm): 5 4    Height (cm): 2 87    Width (cm): 2 87  Other findings:     Free pelvic fluid: not identified      Free peritoneal fluid: not identified    Post-Procedure Details:     Impression:  Anteverted uterus appears within normal limits  The bilateral ovaries meet the sonographic criteria for PCOS without additional cysts or masses  No free fluid  Tolerance: Tolerated well, no immediate complications    Complications: no complications    Additional Procedure Comments:      MomentCam P5 transvaginal transducer E8C with Serial Number [id number] was used during procedure and subsequently cleaned with high level disinfection utilizing the [employer]       Ultrasound performed at:     [facility] for [facility] 126  [address]  [address], [address] [facility]  Phone: [phone]  Fax:  [phone]    Sonohysterogram    Date/Time: 4/14/2021 6:57 AM  Performed by: Maria Victoria Marina DO  Authorized by: Maria Victoria Marina DO   Universal Protocol:  Patient identity confirmed: verbally with patient      Pre-procedure:     Prepped with: Betadine    Procedure:     Cervix cleaned and prepped: yes      Catheter inserted: yes      Uterine cavity distended with saline: yes    Post-procedure:     Patient observed: yes      Post procedure instructions given to patient: yes      Patient tolerated procedure well with no complications: yes    Comments:      Sonohysterogram demonstrates a smooth appearing endometrium  Endometrial biopsy    Date/Time: 4/14/2021 6:57 AM  Performed by: Samantha Crum DO  Authorized by: Samantha Crum DO   Universal Protocol:  Patient identity confirmed: verbally with patient      Indication:     Indications:  Other disorder of menstruation and other abnormal bleeding from female genital tract    Procedure:     Procedure: endometrial biopsy with Pipelle      A bivalve speculum was placed in the vagina: yes      Cervix cleaned and prepped: yes      Specimen collected: specimen collected and sent to pathology      Patient tolerated procedure well with no complications: yes

## 2021-04-19 ENCOUNTER — OFFICE VISIT (OUTPATIENT)
Dept: HEMATOLOGY ONCOLOGY | Facility: CLINIC | Age: 21
End: 2021-04-19
Payer: COMMERCIAL

## 2021-04-19 VITALS
DIASTOLIC BLOOD PRESSURE: 78 MMHG | TEMPERATURE: 98.1 F | RESPIRATION RATE: 18 BRPM | WEIGHT: 293 LBS | HEART RATE: 88 BPM | HEIGHT: 64 IN | SYSTOLIC BLOOD PRESSURE: 142 MMHG | BODY MASS INDEX: 50.02 KG/M2 | OXYGEN SATURATION: 99 %

## 2021-04-19 DIAGNOSIS — D50.0 IRON DEFICIENCY ANEMIA DUE TO CHRONIC BLOOD LOSS: Primary | ICD-10-CM

## 2021-04-19 DIAGNOSIS — R79.89 ELEVATED LFTS: ICD-10-CM

## 2021-04-19 DIAGNOSIS — R19.7 DIARRHEA, UNSPECIFIED TYPE: ICD-10-CM

## 2021-04-19 PROCEDURE — 99214 OFFICE O/P EST MOD 30 MIN: CPT | Performed by: PHYSICIAN ASSISTANT

## 2021-04-19 RX ORDER — SODIUM CHLORIDE 9 MG/ML
20 INJECTION, SOLUTION INTRAVENOUS ONCE
Status: CANCELLED | OUTPATIENT
Start: 2021-04-27

## 2021-04-19 NOTE — PROGRESS NOTES
Hematology/Oncology Outpatient Follow-up  Juan Saul 24 y o  female 2000    Date:  4/19/2021      Assessment and Plan:    1  Iron deficiency anemia due to chronic blood loss  24-year-old female presents for follow-up regarding history of anemia with iron deficiency secondary to chronic blood loss from a menorrhagia  Bleeding disorder workup was completed and negative  She has been following with gyn  She has underwent ultrasound biopsy which was unrevealing  She has PCOS  She has NuvaRing which has helped the length of her period but her  Still remains heavy  She would benefit from a maintenance monthly iron infusion  She is agreeable to this and would like to proceed with this  This has been scheduled today  Follow-up in 4 months with repeat labs  Emphasized importance of compliance with our office with follow-ups, infusions, laboratory studies      - CBC and differential; Future  - Iron Panel (Includes Ferritin, Iron Sat%, Iron, and TIBC); Future    2  Elevated LFTs, 3  Diarrhea, unspecified type  Patient is noted to have a history of elevated LFTs and now states also had diarrhea since last summer  She is recommended a consultation with Gastroenterology regarding this  Reviewed LFTs are likely related to her obesity and likely fatty infiltration of the liver  Weight loss is recommended  - Ambulatory referral to Gastroenterology; Future    She has questions regarding her anemia and COVID vaccine  She is recommended to receive COVID vaccine and is scheduled on 04/27/2021  HPI:  24-year-old female presents for follow-up regarding iron deficiency related to menorrhagia      She did have workup for bleeding disorder which was negative      She was seen in consultation in Aug 2019  Her hemoglobin was 6 4 at that time       She received IV iron, Feraheme 510 mg IV  Hemoglobin improved to 12 5 in Aug 2019      Repeat labs in feb 2020 showed hemoglobin of 10 0    Due to this she received Feraheme 510 mg IV weekly x 2       She was scheduled to follow up in May 2020 and no showed to more than 1 visit       She had labs drawn on 6/15/20 which showed hemoglobin of 6 8, MCV 76  Normal WBC and platelet  She was seen in follow up on 6/17/20  She had Feraheme 510 mg IV weekly x 2 and was advised follow up in 4 weeks and follow up with GYN for AUB  Interval history: Patient states that she still chews ice  Fatigue  Periods are still heavy but had Nuvaring and now 1 5 week, not 2 month like before  She uses 2 overnight pads every 1 hour for the entire 1 5 week until period is over  ROS: Review of Systems   Constitutional: Positive for fatigue  Negative for appetite change, chills and fever  Respiratory: Negative for cough and shortness of breath  Cardiovascular: Negative for chest pain, palpitations and leg swelling  Gastrointestinal: Positive for abdominal pain (associated with diarrhea) and diarrhea (daily after dinner )  Negative for nausea  Genitourinary: Positive for menstrual problem  Negative for difficulty urinating and dysuria  Musculoskeletal: Negative  Skin: Negative  Neurological: Negative for light-headedness and headaches  Hematological: Negative  Psychiatric/Behavioral: Negative          Past Medical History:   Diagnosis Date    Anemia     Anxiety     Asthma     childhood    Chronic headache     Depression     Elevated LFTs     High cholesterol     Hyperlipidemia     Pre-diabetes     Scoliosis     Self-injurious behavior     when 15 y/o    Sleep apnea        Past Surgical History:   Procedure Laterality Date    BREAST CYST INCISION AND DRAINAGE Right 7/25/2017    Procedure: INCISION AND DRAINAGE (I&D) BREAST;  Surgeon: Daphne Tay DO;  Location: [facility];  Service: General    BREAST SURGERY  2017    TONSILLECTOMY      TONSILLECTOMY  2016       Social History     Socioeconomic History    Marital status: Single     Spouse name: None  Number of children: None    Years of education: None    Highest education level: None   Occupational History    Occupation: unemployed   Social Needs    Financial resource strain: None    Food insecurity     Worry: Never true     Inability: Never true    Transportation needs     Medical: No     Non-medical: No   Tobacco Use    Smoking status: Never Smoker    Smokeless tobacco: Never Used   Substance and Sexual Activity    Alcohol use: No    Drug use: Yes     Comment: smokes marijuana once/wk    Sexual activity: Yes     Partners: Male   Lifestyle    Physical activity     Days per week: 0 days     Minutes per session: 0 min    Stress: Not at all   Relationships    Social connections     Talks on phone: Patient refused     Gets together: Patient refused     Attends Denominational service: Patient refused     Active member of club or organization: Patient refused     Attends meetings of clubs or organizations: Patient refused     Relationship status: Patient refused    Intimate partner violence     Fear of current or ex partner: Patient refused     Emotionally abused: Patient refused     Physically abused: Patient refused     Forced sexual activity: Patient refused   Other Topics Concern    None   Social History Narrative    LIVES AT HOME WITH MOM AND DAD       Family History   Problem Relation Age of Onset    Hypertension Mother     Hyperlipidemia Mother     Diabetes Mother     Depression Mother     Thyroid disease Sister     Anemia Sister     Depression Sister     Anxiety disorder Sister     Obesity Family     Allergies Family     Diabetes Family     Appendicitis Maternal Grandmother     Aneurysm Paternal Grandfather     Anemia Sister     Anemia Sister     Prostate cancer Paternal Uncle     Substance Abuse Neg Hx     Mental illness Neg Hx     Stroke Neg Hx        No Known Allergies      Current Outpatient Medications:     albuterol (VENTOLIN HFA) 90 mcg/act inhaler, Inhale 2 puffs every 6 (six) hours as needed for wheezing, Disp: 18 g, Rfl: 0    atorvastatin (LIPITOR) 20 mg tablet, Take 1 tablet (20 mg total) by mouth daily, Disp: 90 tablet, Rfl: 1    escitalopram (LEXAPRO) 5 mg tablet, Take 1 tablet (5 mg total) by mouth daily, Disp: 30 tablet, Rfl: 2    etonogestrel-ethinyl estradiol (NUVARING) 0 12-0 015 MG/24HR vaginal ring, Insert vaginally and leave in place for 3 consecutive weeks, then remove for 1 week , Disp: 3 each, Rfl: 3    fenofibrate (TRICOR) 145 mg tablet, Take 1 tablet (145 mg total) by mouth daily, Disp: 90 tablet, Rfl: 1    ferrous sulfate 325 (65 Fe) mg tablet, Take 1 tablet (325 mg total) by mouth 2 (two) times a day with meals, Disp: 180 tablet, Rfl: 1    lisinopril (ZESTRIL) 2 5 mg tablet, Take 1 tablet (2 5 mg total) by mouth daily, Disp: 90 tablet, Rfl: 1    lovastatin (ALTOPREV) 40 MG 24 hr tablet, Take 1 tablet (40 mg total) by mouth daily at bedtime, Disp: 90 tablet, Rfl: 1    metFORMIN (GLUCOPHAGE) 1000 MG tablet, Take 1 tablet (1,000 mg total) by mouth 2 (two) times a day with meals, Disp: 180 tablet, Rfl: 1    omega-3-acid ethyl esters (LOVAZA) 1 g capsule, Take 2 capsules (2 g total) by mouth 2 (two) times a day, Disp: 360 capsule, Rfl: 1    sitaGLIPtin (JANUVIA) 50 mg tablet, Take 1 tablet (50 mg total) by mouth daily, Disp: 30 tablet, Rfl: 3    terconazole (TERAZOL 7) 0 4 % vaginal cream, Insert 1 applicator into the vagina daily at bedtime, Disp: 45 g, Rfl: 0    ascorbic acid (VITAMIN C) 250 mg tablet, Take 1 tablet (250 mg total) by mouth daily (Patient not taking: Reported on 3/30/2021), Disp: 180 tablet, Rfl: 1    CVS D3 125 MCG (5000 UT) capsule, TAKE 1 CAPSULE (5,000 UNITS TOTAL) BY MOUTH DAILY (Patient not taking: Reported on 3/30/2021), Disp: 90 capsule, Rfl: 1    cyanocobalamin (VITAMIN B-12) 1000 MCG tablet, Take 1 tablet (1,000 mcg total) by mouth daily (Patient not taking: Reported on 3/30/2021), Disp: 90 tablet, Rfl: 0   cyclobenzaprine (FLEXERIL) 10 mg tablet, Take 1 tablet (10 mg total) by mouth 2 (two) times a day as needed for muscle spasms (Patient not taking: Reported on 3/30/2021), Disp: 20 tablet, Rfl: 0    medroxyPROGESTERone (PROVERA) 10 mg tablet, Take 1 tablet (10 mg total) by mouth daily for 10 days (Patient not taking: Reported on 2/18/2020), Disp: 10 tablet, Rfl: 0      Physical Exam:  /78 (BP Location: Left arm, Patient Position: Sitting, Cuff Size: Adult)   Pulse 88   Temp 98 1 °F (36 7 °C)   Resp 18   Ht 5' 4" (1 626 m)   Wt (!) 148 kg (327 lb)   SpO2 99%   BMI 56 13 kg/m²     Physical Exam  Vitals signs reviewed  Constitutional:       General: She is not in acute distress  Appearance: She is well-developed  She is obese  HENT:      Head: Normocephalic and atraumatic  Eyes:      General: No scleral icterus  Conjunctiva/sclera: Conjunctivae normal    Neck:      Musculoskeletal: Normal range of motion and neck supple  Cardiovascular:      Rate and Rhythm: Normal rate and regular rhythm  Heart sounds: Normal heart sounds  No murmur  Pulmonary:      Effort: Pulmonary effort is normal  No respiratory distress  Breath sounds: Normal breath sounds  Abdominal:      Palpations: Abdomen is soft  Tenderness: There is no abdominal tenderness  Musculoskeletal: Normal range of motion  General: No tenderness  Right lower leg: No edema  Left lower leg: No edema  Lymphadenopathy:      Cervical: No cervical adenopathy  Skin:     General: Skin is warm and dry  Neurological:      Mental Status: She is alert and oriented to person, place, and time  Cranial Nerves: No cranial nerve deficit     Psychiatric:         Mood and Affect: Mood normal          Behavior: Behavior normal            Labs:  Lab Results   Component Value Date    WBC 9 55 03/31/2021    HGB 11 3 (L) 03/31/2021    HCT 36 1 03/31/2021    MCV 78 (L) 03/31/2021     03/31/2021     Lab Results   Component Value Date     05/02/2016    K 3 6 03/31/2021     03/31/2021    CO2 26 03/31/2021    ANIONGAP 17 09/18/2014    BUN 6 03/31/2021    CREATININE 0 63 03/31/2021    GLUF 177 (H) 03/31/2021    CALCIUM 9 3 03/31/2021     (H) 03/31/2021     (H) 03/31/2021    ALKPHOS 104 03/31/2021    PROT 7 1 05/02/2016    BILITOT 0 2 05/02/2016    EGFR 129 03/31/2021       Patient voiced understanding and agreement in the above discussion  Aware to contact our office with questions/symptoms in the interim  This note has been generated by voice recognition software system  Therefore, there may be spelling, grammar, and or syntax errors  Please contact if questions arise

## 2021-04-20 ENCOUNTER — OFFICE VISIT (OUTPATIENT)
Dept: FAMILY MEDICINE CLINIC | Facility: CLINIC | Age: 21
End: 2021-04-20
Payer: COMMERCIAL

## 2021-04-20 VITALS
OXYGEN SATURATION: 98 % | SYSTOLIC BLOOD PRESSURE: 136 MMHG | DIASTOLIC BLOOD PRESSURE: 78 MMHG | HEIGHT: 64 IN | TEMPERATURE: 97 F | BODY MASS INDEX: 50.02 KG/M2 | WEIGHT: 293 LBS | RESPIRATION RATE: 17 BRPM | HEART RATE: 99 BPM

## 2021-04-20 DIAGNOSIS — E78.5 HYPERLIPIDEMIA LDL GOAL <70: ICD-10-CM

## 2021-04-20 DIAGNOSIS — R79.89 ELEVATED TSH: ICD-10-CM

## 2021-04-20 DIAGNOSIS — G47.33 OBSTRUCTIVE SLEEP APNEA: ICD-10-CM

## 2021-04-20 DIAGNOSIS — E66.01 MORBID OBESITY WITH BMI OF 50.0-59.9, ADULT (HCC): ICD-10-CM

## 2021-04-20 DIAGNOSIS — N93.9 ABNORMAL UTERINE BLEEDING: ICD-10-CM

## 2021-04-20 DIAGNOSIS — E11.9 TYPE 2 DIABETES MELLITUS WITHOUT COMPLICATION, WITHOUT LONG-TERM CURRENT USE OF INSULIN (HCC): Primary | ICD-10-CM

## 2021-04-20 DIAGNOSIS — R79.89 ELEVATED LFTS: ICD-10-CM

## 2021-04-20 PROCEDURE — 1036F TOBACCO NON-USER: CPT | Performed by: NURSE PRACTITIONER

## 2021-04-20 PROCEDURE — 3008F BODY MASS INDEX DOCD: CPT | Performed by: NURSE PRACTITIONER

## 2021-04-20 PROCEDURE — 99214 OFFICE O/P EST MOD 30 MIN: CPT | Performed by: NURSE PRACTITIONER

## 2021-04-20 NOTE — ASSESSMENT & PLAN NOTE
Last seen by bariatric February of 2020, no recent followups   She declined referral to a dietitian or nutritionist today  Encouraged lifestyle modifications including dietary changes and regular exercise

## 2021-04-20 NOTE — ASSESSMENT & PLAN NOTE
Persisting issue   I reordered an ultrasound of her liver today   Likely a combination of her obesity and dietary choices  Advised to follow a low-fat diet, avoid excessive alcohol use or over-the-counter Tylenol   Repeat blood work as ordered including an acute hepatitis panel  She does have a referral to GI as well

## 2021-04-20 NOTE — ASSESSMENT & PLAN NOTE
LDL at 126, goal is less than 70 with a diagnosis of diabetes   I reviewed this today with the patient   She does have Lipitor, fenofibrate and Lovaza but she does not take these daily    I did emphasize the importance of medication compliance   Dietary modifications also reiterated   Repeat blood work in 90 days

## 2021-04-20 NOTE — ASSESSMENT & PLAN NOTE
I again advised the patient to reschedule her evaluation with Sleep Medicine that she missed several months ago   Reviewed the long-term complications of untreated HARJIT   Encouraged weight loss

## 2021-04-20 NOTE — PROGRESS NOTES
Assessment/Plan:    Type 2 diabetes mellitus without complication, without long-term current use of insulin (Formerly [facility])    Lab Results   Component Value Date    HGBA1C 8 8 (A) 03/30/2021     Patient was recently started on Januvia 50 mg 1 tablet daily which he has been tolerating  She will continue Januvia and metformin 1000 mg b i d    emphasized the importance of taking these medications every day  Her diabetic eye exam is scheduled for 04/30/2021  Reviewed blood work as ordered for 90 days    Obstructive sleep apnea   I again advised the patient to reschedule her evaluation with Sleep Medicine that she missed several months ago   Reviewed the long-term complications of untreated HARJIT   Encouraged weight loss    Abnormal uterine bleeding   Following with Gynecology   She did have a recent uterine biopsy    Morbid obesity with BMI of 50 0-59 9, adult ([facility] )   Last seen by bariatric February of 2020, no recent followups   She declined referral to a dietitian or nutritionist today  Encouraged lifestyle modifications including dietary changes and regular exercise    Hyperlipidemia LDL goal <70   LDL at 126, goal is less than 70 with a diagnosis of diabetes   I reviewed this today with the patient   She does have Lipitor, fenofibrate and Lovaza but she does not take these daily    I did emphasize the importance of medication compliance   Dietary modifications also reiterated   Repeat blood work in 90 days    Elevated LFTs   Persisting issue   I reordered an ultrasound of her liver today   Likely a combination of her obesity and dietary choices  Advised to follow a low-fat diet, avoid excessive alcohol use or over-the-counter Tylenol   Repeat blood work as ordered including an acute hepatitis panel  She does have a referral to GI as well       Diagnoses and all orders for this visit:    Type 2 diabetes mellitus without complication, without long-term current use of insulin ([facility] )  -     Hemoglobin A1C; Future    Obstructive sleep apnea    Abnormal uterine bleeding    Morbid obesity with BMI of 50 0-59 9, adult (HCC)    Hyperlipidemia LDL goal <70  -     Comprehensive metabolic panel; Future  -     Lipid panel; Future    Elevated LFTs  -     US liver; Future  -     Comprehensive metabolic panel; Future  -     Hepatitis panel, acute; Future    Elevated TSH  -     TSH, 3rd generation with Free T4 reflex; Future          Subjective:      Patient ID: Yonatan Flowers is a 24 y o  female  HPI      Pt presents by herself today to review lab work results    DMT2- A1C at 8 8--taking Metformin 1000 mg BID and Januvia 50 mg which was just initiated on 3/30/21  She has been better about taking her medications daily, previously, she used to skip meds on the weekends     HARJIT- she missed her evaluation with sleep medicine back in 9/2020   Not currently using CPAP    HLD- taking Lovastatin, Fenofibrate and Lovaza   She continues to miss these pills approximately two days per week   Recent lipid panel with total cholesterol 202, triglycerides 235, HDL 29,     DUSTIN- recently evaluated by Hematology and is scheduled for monthly iron infusions  She also saw GYN for a uterine biopsy given her persistent menorrhagia     Recent TSH is 3 770, free T4 1 36    Elevated LFTs- , , previously with ,   Abdominal U/S ordered previously, but never performed   She reports drinking more alcohol as she just turned 21- Hematology did alert her to these results and she has backed off drinking alcohol (feels fine with doing so)  She was referred to GI as well given her elevated LFTs and pt also noted having diarrhea for the past year or so with almost any food  She reports she did have diarrhea after initiating metformin, but that resolved within a few weeks of taking his medication    She denies any blood in her stool, abdominal pain, nausea, vomiting      The following portions of the patient's history were reviewed and updated as appropriate: allergies, current medications, past family history, past medical history, past social history, past surgical history and problem list     Review of Systems   Constitutional: Negative for chills and fever  HENT: Negative for ear pain and sore throat  Eyes: Negative for pain and visual disturbance  Respiratory: Negative for cough and shortness of breath  Cardiovascular: Negative for chest pain, palpitations and leg swelling  Gastrointestinal: Positive for diarrhea  Negative for abdominal distention, abdominal pain, anal bleeding, blood in stool, constipation, nausea, rectal pain and vomiting  Genitourinary: Positive for menstrual problem  Negative for dysuria and hematuria  Musculoskeletal: Negative for arthralgias and back pain  Skin: Negative for color change and rash  Neurological: Negative for seizures and syncope  Hematological: Negative for adenopathy  Psychiatric/Behavioral: Negative for dysphoric mood, self-injury, sleep disturbance and suicidal ideas  All other systems reviewed and are negative  Objective:      /78 (BP Location: Left arm, Patient Position: Sitting, Cuff Size: Adult)   Pulse 99   Temp (!) 97 °F (36 1 °C) (Tympanic)   Resp 17   Ht 5' 4" (1 626 m)   Wt (!) 150 kg (330 lb)   SpO2 98%   BMI 56 64 kg/m²          Physical Exam  Constitutional:       General: She is not in acute distress  Appearance: She is well-developed  She is obese  She is not ill-appearing, toxic-appearing or diaphoretic  HENT:      Head: Normocephalic and atraumatic  Eyes:      Extraocular Movements: Extraocular movements intact  Conjunctiva/sclera: Conjunctivae normal       Pupils: Pupils are equal, round, and reactive to light  Neck:      Musculoskeletal: Normal range of motion and neck supple  No neck rigidity or muscular tenderness  Thyroid: No thyromegaly  Vascular: No carotid bruit     Cardiovascular:      Rate and Rhythm: Normal rate and regular rhythm  Heart sounds: Normal heart sounds  No murmur  Pulmonary:      Effort: Pulmonary effort is normal  No respiratory distress  Breath sounds: Normal breath sounds  No wheezing  Abdominal:      General: Bowel sounds are normal  There is no distension  Palpations: Abdomen is soft  There is no mass  Tenderness: There is no abdominal tenderness  There is no right CVA tenderness, left CVA tenderness, guarding or rebound  Hernia: No hernia is present  Musculoskeletal: Normal range of motion  Lymphadenopathy:      Cervical: No cervical adenopathy  Skin:     General: Skin is warm and dry  Neurological:      General: No focal deficit present  Mental Status: She is alert and oriented to person, place, and time  Psychiatric:         Mood and Affect: Mood normal          Behavior: Behavior normal          Thought Content:  Thought content normal          Judgment: Judgment normal

## 2021-04-20 NOTE — ASSESSMENT & PLAN NOTE
Lab Results   Component Value Date    HGBA1C 8 8 (A) 03/30/2021     Patient was recently started on Januvia 50 mg 1 tablet daily which he has been tolerating  She will continue Januvia and metformin 1000 mg b i d    emphasized the importance of taking these medications every day  Her diabetic eye exam is scheduled for 04/30/2021  Reviewed blood work as ordered for 90 days

## 2021-04-22 ENCOUNTER — HOSPITAL ENCOUNTER (EMERGENCY)
Facility: HOSPITAL | Age: 21
Discharge: HOME | End: 2021-04-22
Attending: EMERGENCY MEDICINE | Admitting: EMERGENCY MEDICINE
Payer: COMMERCIAL

## 2021-04-22 VITALS
HEART RATE: 89 BPM | SYSTOLIC BLOOD PRESSURE: 160 MMHG | DIASTOLIC BLOOD PRESSURE: 81 MMHG | OXYGEN SATURATION: 99 % | BODY MASS INDEX: 56.64 KG/M2 | TEMPERATURE: 98.1 F | RESPIRATION RATE: 18 BRPM | WEIGHT: 293 LBS

## 2021-04-22 DIAGNOSIS — J34.89 NASAL DRAINAGE: Primary | ICD-10-CM

## 2021-04-22 DIAGNOSIS — Z20.822 ENCOUNTER FOR LABORATORY TESTING FOR COVID-19 VIRUS: ICD-10-CM

## 2021-04-22 LAB — SARS-COV-2 RNA RESP QL NAA+PROBE: NEGATIVE

## 2021-04-22 PROCEDURE — U0003 INFECTIOUS AGENT DETECTION BY NUCLEIC ACID (DNA OR RNA); SEVERE ACUTE RESPIRATORY SYNDROME CORONAVIRUS 2 (SARS-COV-2) (CORONAVIRUS DISEASE [COVID-19]), AMPLIFIED PROBE TECHNIQUE, MAKING USE OF HIGH THROUGHPUT TECHNOLOGIES AS DESCRIBED BY CMS-2020-01-R: HCPCS | Performed by: EMERGENCY MEDICINE

## 2021-04-22 PROCEDURE — 99283 EMERGENCY DEPT VISIT LOW MDM: CPT

## 2021-04-22 PROCEDURE — 99282 EMERGENCY DEPT VISIT SF MDM: CPT | Performed by: EMERGENCY MEDICINE

## 2021-04-22 PROCEDURE — U0005 INFEC AGEN DETEC AMPLI PROBE: HCPCS | Performed by: EMERGENCY MEDICINE

## 2021-04-22 NOTE — Clinical Note
Silvana Jonah was seen and treated in our emergency department on 4/22/2021  Diagnosis:     Mervin Joshimirian    She may return on this date: If you have any questions or concerns, please don't hesitate to call        [place] MD    ______________________________           _______________          _______________  Hospital Representative                              Date                                Time

## 2021-04-22 NOTE — ED ATTENDING ATTESTATION
4/22/2021  INasreen MD, saw and evaluated the patient  I have discussed the patient with the resident/non-physician practitioner and agree with the resident's/non-physician practitioner's findings, Plan of Care, and MDM as documented in the resident's/non-physician practitioner's note, except where noted  All available labs and Radiology studies were reviewed  I was present for key portions of any procedure(s) performed by the resident/non-physician practitioner and I was immediately available to provide assistance  At this point I agree with the current assessment done in the Emergency Department  I have conducted an independent evaluation of this patient a history and physical is as follows:    ED Course     Emergency Department Note- Fabián Judge 24 y o  female MRN: [id number]    Unit/Bed#: ED 11 Encounter: [id number]    Fabián Judge is a 24 y o  female who presents with   Chief Complaint   Patient presents with    Nasal Drainage     pt states that she has a headache in the back of her head and for 2 days has had yellow drainage from her nose  states feeling SOB earlier but denies currently          History of Present Illness   HPI:  Fabián Judge is a 24 y o  female who presents for evaluation of:  Runny nose intermittently over the last day with intermittent headaches over the last 2 days  Patient denies covid contacts, covid infection, covid vaccination, anosmia, and ageusia  Patient still has a mild HA, but rhinorrhea has resolved  Patient has not taken any pain relief at home for the HA  Review of Systems   Constitutional: Negative for chills and fever  HENT: Positive for congestion, postnasal drip and rhinorrhea  Negative for nosebleeds  Respiratory: Negative for cough and shortness of breath  Cardiovascular: Negative for chest pain and palpitations  Gastrointestinal: Negative for nausea and vomiting  Neurological: Positive for dizziness (mild) and headaches  All other systems reviewed and are negative  Historical Information   Past Medical History:   Diagnosis Date    Anemia     Anxiety     Asthma     childhood    Chronic headache     Depression     Elevated LFTs     High cholesterol     Hyperlipidemia     Pre-diabetes     Scoliosis     Self-injurious behavior     when 15 y/o    Sleep apnea      Past Surgical History:   Procedure Laterality Date    BREAST CYST INCISION AND DRAINAGE Right 7/25/2017    Procedure: INCISION AND DRAINAGE (I&D) BREAST;  Surgeon: Maggie Rice DO;  Location: [facility];  Service: General    BREAST SURGERY  2017    TONSILLECTOMY      TONSILLECTOMY  2016     Social History   Social History     Substance and Sexual Activity   Alcohol Use No     Social History     Substance and Sexual Activity   Drug Use Yes    Comment: smokes marijuana once/wk     Social History     Tobacco Use   Smoking Status Never Smoker   Smokeless Tobacco Never Used     Family History: non-contributory    Meds/Allergies   all medications and allergies reviewed  No Known Allergies    Objective   First Vitals:   Blood Pressure: 160/81 (04/22/21 0026)  Pulse: 89 (04/22/21 0026)  Temperature: 98 1 °F (36 7 °C) (04/22/21 0026)  Respirations: 18 (04/22/21 0026)  Weight - Scale: (!) 150 kg (330 lb) (04/22/21 0026)  SpO2: 99 % (04/22/21 0026)    Current Vitals:   Blood Pressure: 160/81 (04/22/21 0026)  Pulse: 89 (04/22/21 0026)  Temperature: 98 1 °F (36 7 °C) (04/22/21 0026)  Respirations: 18 (04/22/21 0026)  Weight - Scale: (!) 150 kg (330 lb) (04/22/21 0026)  SpO2: 99 % (04/22/21 0026)    No intake or output data in the 24 hours ending 04/22/21 0054    Invasive Devices     None                 Physical Exam  Vitals signs and nursing note reviewed  Constitutional:       Appearance: She is well-developed  HENT:      Head: Normocephalic and atraumatic        Right Ear: External ear normal       Left Ear: External ear normal       Nose: Nose normal  Mouth/Throat:      Pharynx: No oropharyngeal exudate  Eyes:      Pupils: Pupils are equal, round, and reactive to light  Neck:      Musculoskeletal: Normal range of motion and neck supple  Cardiovascular:      Rate and Rhythm: Normal rate and regular rhythm  Pulmonary:      Effort: Pulmonary effort is normal       Breath sounds: Normal breath sounds  Abdominal:      General: Bowel sounds are normal       Palpations: Abdomen is soft  Musculoskeletal: Normal range of motion  General: No deformity  Skin:     General: Skin is warm and dry  Neurological:      Mental Status: She is alert and oriented to person, place, and time  Psychiatric:         Behavior: Behavior normal          Thought Content: Thought content normal          Judgment: Judgment normal            Medical Decision Makin  URI symptoms: covid screen  2  HA; acetaminophen    No results found for this or any previous visit (from the past 39 hour(s))  No orders to display         Portions of the record may have been created with voice recognition software  Occasional wrong word or "sound a like" substitutions may have occurred due to the inherent limitations of voice recognition software  Read the chart carefully and recognize, using context, where substitutions have occurred          Critical Care Time  Procedures

## 2021-04-22 NOTE — Clinical Note
Seth Gandhi was seen and treated in our emergency department on 4/22/2021  Diagnosis:     Doron Blair    She may return on this date: If you have any questions or concerns, please don't hesitate to call        Lisette Guzman MD    ______________________________           _______________          _______________  Hospital Representative                              Date                                Time

## 2021-04-22 NOTE — Clinical Note
Mark Graves was seen and treated in our emergency department on 4/22/2021  Diagnosis:     Arely Cheek    She may return on this date: If you have any questions or concerns, please don't hesitate to call        Minal Fu MD    ______________________________           _______________          _______________  Hospital Representative                              Date                                Time

## 2021-04-23 DIAGNOSIS — E11.9 TYPE 2 DIABETES MELLITUS WITHOUT COMPLICATION, WITHOUT LONG-TERM CURRENT USE OF INSULIN (HCC): ICD-10-CM

## 2021-04-24 NOTE — ED PROVIDER NOTES
History  Chief Complaint   Patient presents with    Nasal Drainage     pt states that she has a headache in the back of her head and for 2 days has had yellow drainage from her nose  states feeling SOB earlier but denies currently      23 yo F with h/o headaches, HLD, presenting for evaluation primarily of clear rhinorrhea earlier today that resolved spontaneously  Stating that she would've come in earlier but she was unable to get here  She has had a mild headache in the back of her head as well on and off for 2 days  The headache is not thunderclap in nature, not the worst headache of her life  She doesn't want anything for her headache right now  She had some mild shortness of breath earlier today when she was having a runny nose but this has also resolved spontaneously  She denies any covid-19 contacts  Denies any cough  She hasn't had any other symptoms such as changes in vision, numbness/tingling/weakness, nausea/vomiting, diarrhea, abdominal pain  Currently on her period  History provided by:  Patient   used: No        Prior to Admission Medications   Prescriptions Last Dose Informant Patient Reported? Taking?    [employer] D3 125 MCG (5000 UT) capsule  Self No No   Sig: TAKE 1 CAPSULE (5,000 UNITS TOTAL) BY MOUTH DAILY   albuterol (VENTOLIN HFA) 90 mcg/act inhaler  Self No No   Sig: Inhale 2 puffs every 6 (six) hours as needed for wheezing   ascorbic acid (VITAMIN C) 250 mg tablet  Self No No   Sig: Take 1 tablet (250 mg total) by mouth daily   cyanocobalamin (VITAMIN B-12) 1000 MCG tablet  Self No No   Sig: Take 1 tablet (1,000 mcg total) by mouth daily   cyclobenzaprine (FLEXERIL) 10 mg tablet  Self No No   Sig: Take 1 tablet (10 mg total) by mouth 2 (two) times a day as needed for muscle spasms   escitalopram (LEXAPRO) 5 mg tablet  Self No No   Sig: Take 1 tablet (5 mg total) by mouth daily   etonogestrel-ethinyl estradiol (NUVARING) 0 12-0 015 MG/24HR vaginal ring  Self No No   Sig: Insert vaginally and leave in place for 3 consecutive weeks, then remove for 1 week     fenofibrate (TRICOR) 145 mg tablet  Self No No   Sig: Take 1 tablet (145 mg total) by mouth daily   ferrous sulfate 325 (65 Fe) mg tablet  Self No No   Sig: Take 1 tablet (325 mg total) by mouth 2 (two) times a day with meals   lisinopril (ZESTRIL) 2 5 mg tablet  Self No No   Sig: Take 1 tablet (2 5 mg total) by mouth daily   lovastatin (ALTOPREV) 40 MG 24 hr tablet  Self No No   Sig: Take 1 tablet (40 mg total) by mouth daily at bedtime   medroxyPROGESTERone (PROVERA) 10 mg tablet  Self No No   Sig: Take 1 tablet (10 mg total) by mouth daily for 10 days   Patient not taking: Reported on 2/18/2020   metFORMIN (GLUCOPHAGE) 1000 MG tablet  Self No No   Sig: Take 1 tablet (1,000 mg total) by mouth 2 (two) times a day with meals   omega-3-acid ethyl esters (LOVAZA) 1 g capsule  Self No No   Sig: Take 2 capsules (2 g total) by mouth 2 (two) times a day   sitaGLIPtin (JANUVIA) 50 mg tablet  Self No No   Sig: Take 1 tablet (50 mg total) by mouth daily   terconazole (TERAZOL 7) 0 4 % vaginal cream  Self No No   Sig: Insert 1 applicator into the vagina daily at bedtime      Facility-Administered Medications: None       Past Medical History:   Diagnosis Date    Anemia     Anxiety     Asthma     childhood    Chronic headache     Depression     Elevated LFTs     High cholesterol     Hyperlipidemia     Pre-diabetes     Scoliosis     Self-injurious behavior     when 15 y/o    Sleep apnea        Past Surgical History:   Procedure Laterality Date    BREAST CYST INCISION AND DRAINAGE Right 7/25/2017    Procedure: INCISION AND DRAINAGE (I&D) BREAST;  Surgeon: Yeni Mcneill DO;  Location: [facility];  Service: General    BREAST SURGERY  2017    TONSILLECTOMY      TONSILLECTOMY  2016       Family History   Problem Relation Age of Onset    Hypertension Mother     Hyperlipidemia Mother     Diabetes Mother     Depression Mother    Madi Rothman Thyroid disease Sister     Anemia Sister     Depression Sister     Anxiety disorder Sister     Obesity Family     Allergies Family     Diabetes Family     Appendicitis Maternal Grandmother     Aneurysm Paternal Grandfather     Anemia Sister     Anemia Sister     Prostate cancer Paternal Uncle     Substance Abuse Neg Hx     Mental illness Neg Hx     Stroke Neg Hx      I have reviewed and agree with the history as documented  E-Cigarette/Vaping    E-Cigarette Use Never User      E-Cigarette/Vaping Substances    Nicotine No     THC No     CBD No     Flavoring No     Other No     Unknown No      Social History     Tobacco Use    Smoking status: Never Smoker    Smokeless tobacco: Never Used   Substance Use Topics    Alcohol use: No    Drug use: Yes     Comment: smokes marijuana once/wk        Review of Systems   Constitutional: Negative for chills, fatigue and fever  HENT: Positive for rhinorrhea  Negative for ear pain and sore throat  Eyes: Negative for pain and visual disturbance  Respiratory: Positive for shortness of breath  Negative for cough and wheezing  Cardiovascular: Negative for chest pain and palpitations  Gastrointestinal: Negative for abdominal pain, diarrhea, nausea and vomiting  Genitourinary: Negative for dysuria and hematuria  Musculoskeletal: Negative for arthralgias and back pain  Skin: Negative for color change and rash  Neurological: Negative for dizziness, seizures, syncope, light-headedness and headaches  Psychiatric/Behavioral: Negative for confusion  The patient is not nervous/anxious  All other systems reviewed and are negative        Physical Exam  ED Triage Vitals [04/22/21 0026]   Temperature Pulse Respirations Blood Pressure SpO2   98 1 °F (36 7 °C) 89 18 160/81 99 %      Temp src Heart Rate Source Patient Position - Orthostatic VS BP Location FiO2 (%)   -- -- -- -- --      Pain Score       --             Orthostatic Vital Signs  Vitals: 04/22/21 0026   BP: 160/81   Pulse: 89       Physical Exam  Vitals signs and nursing note reviewed  Constitutional:       General: She is not in acute distress  Appearance: Normal appearance  She is well-developed  She is not ill-appearing or diaphoretic  HENT:      Head: Normocephalic and atraumatic  Right Ear: Tympanic membrane and external ear normal       Left Ear: Tympanic membrane and external ear normal       Nose: Nose normal       Mouth/Throat:      Mouth: Mucous membranes are moist       Pharynx: Oropharynx is clear  Eyes:      Conjunctiva/sclera: Conjunctivae normal    Neck:      Musculoskeletal: Neck supple  Cardiovascular:      Rate and Rhythm: Normal rate and regular rhythm  Heart sounds: No murmur  Pulmonary:      Effort: Pulmonary effort is normal  No respiratory distress  Breath sounds: Normal breath sounds  No wheezing or rales  Abdominal:      General: Abdomen is flat  There is no distension  Palpations: Abdomen is soft  Tenderness: There is no abdominal tenderness  Musculoskeletal: Normal range of motion  Skin:     General: Skin is warm and dry  Neurological:      General: No focal deficit present  Mental Status: She is alert  Psychiatric:         Mood and Affect: Mood normal          ED Medications  Medications - No data to display    Diagnostic Studies  Results Reviewed     Procedure Component Value Units Date/Time    [facility][id number][facility]  (Normal) Collected: 04/22/21 0049    Lab Status: Final result Specimen: Nares from Nasopharyngeal Swab Updated: 04/22/21 1551     SARS-CoV-2 Negative    Narrative: The specimen collection materials, transport medium, and/or testing methodology utilized in the production of these test results have been proven to be reliable in a limited validation with an abbreviated program under the Emergency Utilization Authorization provided by the FDA    Testing reported as "Presumptive positive" will be confirmed with secondary testing to ensure result accuracy  Clinical caution and judgement should be used with the interpretation of these results with consideration of the clinical impression and other laboratory testing  Testing reported as "Positive" or "Negative" has been proven to be accurate according to standard laboratory validation requirements  All testing is performed with control materials showing appropriate reactivity at standard intervals  No orders to display         Procedures  Procedures      ED Course                                       MDM  Number of Diagnoses or Management Options  Encounter for laboratory testing for COVID-19 virus:   Nasal drainage:   Diagnosis management comments: 51-year-old female presenting for episode of clear nasal drainage earlier today associated with shortness of breath  Both symptoms have since resolved  Patient is well-appearing on exam   No focal findings on exam   Afebrile  Will send COVID-19 testing  Patient has no tenderness of the frontal or maxillary sinuses  Appropriate for discharge at this time  Disposition  Final diagnoses:   Nasal drainage   Encounter for laboratory testing for COVID-19 virus     Time reflects when diagnosis was documented in both MDM as applicable and the Disposition within this note     Time User Action Codes Description Comment    4/22/2021 12:49 AM Wesley Saldivar Add [J34 89] Nasal drainage     4/22/2021 12:50 AM Wesley Saldivar Add [Z20 822] Encounter for laboratory testing for COVID-19 virus       ED Disposition     ED Disposition Condition Date/Time Comment    Discharge Good u Apr 22, 2021 12:49 AM Tuan Alamo discharge to home/self care              Follow-up Information     Follow up With Specialties Details Why Contact Info Viktoria Elmore [address], [phone] Nurse Practitioner Schedule an appointment as soon as possible for a visit in 2 days For reevaluation as we discussed   [address]  [address]       [facility] Emergency Department Emergency Medicine Go to  As needed [address]  [address] Emergency Department, [address], [place], [place], [address]          Discharge Medication List as of 4/22/2021  1:15 AM      CONTINUE these medications which have NOT CHANGED    Details   albuterol (VENTOLIN HFA) 90 mcg/act inhaler Inhale 2 puffs every 6 (six) hours as needed for wheezing, Starting Thu 1/3/2019, Normal      ascorbic acid (VITAMIN C) 250 mg tablet Take 1 tablet (250 mg total) by mouth daily, Starting Tue 2/4/2020, Normal      CVS D3 125 MCG (5000 UT) capsule TAKE 1 CAPSULE (5,000 UNITS TOTAL) BY MOUTH DAILY, Normal      cyanocobalamin (VITAMIN B-12) 1000 MCG tablet Take 1 tablet (1,000 mcg total) by mouth daily, Starting Tue 2/4/2020, Normal      cyclobenzaprine (FLEXERIL) 10 mg tablet Take 1 tablet (10 mg total) by mouth 2 (two) times a day as needed for muscle spasms, Starting Sat 1/2/2021, Print      escitalopram (LEXAPRO) 5 mg tablet Take 1 tablet (5 mg total) by mouth daily, Starting Tue 2/18/2020, Until Mon 4/19/2021, Normal      etonogestrel-ethinyl estradiol (NUVARING) 0 12-0 015 MG/24HR vaginal ring Insert vaginally and leave in place for 3 consecutive weeks, then remove for 1 week , Normal      fenofibrate (TRICOR) 145 mg tablet Take 1 tablet (145 mg total) by mouth daily, Starting Tue 3/30/2021, Normal      ferrous sulfate 325 (65 Fe) mg tablet Take 1 tablet (325 mg total) by mouth 2 (two) times a day with meals, Starting Tue 2/4/2020, Normal      lisinopril (ZESTRIL) 2 5 mg tablet Take 1 tablet (2 5 mg total) by mouth daily, Starting Tue 3/30/2021, Normal      lovastatin (ALTOPREV) 40 MG 24 hr tablet Take 1 tablet (40 mg total) by mouth daily at bedtime, Starting Tue 3/30/2021, Normal      medroxyPROGESTERone (PROVERA) 10 mg tablet Take 1 tablet (10 mg total) by mouth daily for 10 days, Starting Mon 2/3/2020, Until Thu 2/13/2020, Normal      metFORMIN (GLUCOPHAGE) 1000 MG tablet Take 1 tablet (1,000 mg total) by mouth 2 (two) times a day with meals, Starting Tue 3/30/2021, Normal      omega-3-acid ethyl esters (LOVAZA) 1 g capsule Take 2 capsules (2 g total) by mouth 2 (two) times a day, Starting Tue 3/30/2021, Normal      sitaGLIPtin (JANUVIA) 50 mg tablet Take 1 tablet (50 mg total) by mouth daily, Starting Tue 3/30/2021, Normal      terconazole (TERAZOL 7) 0 4 % vaginal cream Insert 1 applicator into the vagina daily at bedtime, Starting Wed 4/7/2021, Normal           No discharge procedures on file  PDMP Review     None           ED Provider  Attending physically available and evaluated Tuan Alamo I managed the patient along with the ED Attending      Electronically Signed by         Jennifer Gutierrez MD  04/24/21 2039

## 2021-04-27 ENCOUNTER — HOSPITAL ENCOUNTER (OUTPATIENT)
Dept: INFUSION CENTER | Facility: CLINIC | Age: 21
Discharge: HOME | End: 2021-04-27
Payer: COMMERCIAL

## 2021-04-27 VITALS
DIASTOLIC BLOOD PRESSURE: 89 MMHG | SYSTOLIC BLOOD PRESSURE: 129 MMHG | RESPIRATION RATE: 18 BRPM | TEMPERATURE: 98.9 F | HEART RATE: 74 BPM

## 2021-04-27 DIAGNOSIS — E11.9 TYPE 2 DIABETES MELLITUS WITHOUT COMPLICATION, WITHOUT LONG-TERM CURRENT USE OF INSULIN (HCC): Primary | ICD-10-CM

## 2021-04-27 DIAGNOSIS — D50.0 IRON DEFICIENCY ANEMIA DUE TO CHRONIC BLOOD LOSS: Primary | ICD-10-CM

## 2021-04-27 DIAGNOSIS — Z01.419 ENCOUNTER FOR GYNECOLOGICAL EXAMINATION WITH PAPANICOLAOU SMEAR OF CERVIX: Primary | ICD-10-CM

## 2021-04-27 PROCEDURE — 96365 THER/PROPH/DIAG IV INF INIT: CPT

## 2021-04-27 RX ORDER — EMPAGLIFLOZIN 25 MG/1
25 TABLET, FILM COATED ORAL EVERY MORNING
Qty: 30 TABLET | Refills: 5 | Status: SHIPPED | OUTPATIENT
Start: 2021-04-27 | End: 2021-11-12 | Stop reason: ALTCHOICE

## 2021-04-27 RX ORDER — SODIUM CHLORIDE 9 MG/ML
20 INJECTION, SOLUTION INTRAVENOUS ONCE
Status: COMPLETED | OUTPATIENT
Start: 2021-04-27 | End: 2021-04-27

## 2021-04-27 RX ORDER — SODIUM CHLORIDE 9 MG/ML
20 INJECTION, SOLUTION INTRAVENOUS ONCE
Status: CANCELLED | OUTPATIENT
Start: 2021-05-25

## 2021-04-27 RX ADMIN — SODIUM CHLORIDE 200 MG: 9 INJECTION, SOLUTION INTRAVENOUS at 12:27

## 2021-04-27 RX ADMIN — SODIUM CHLORIDE 20 ML/HR: 0.9 INJECTION, SOLUTION INTRAVENOUS at 12:20

## 2021-04-27 NOTE — PLAN OF CARE
Problem: Potential for Falls  Goal: Patient will remain free of falls  Description: INTERVENTIONS:  - Assess patient frequently for physical needs  -  Identify cognitive and physical deficits and behaviors that affect risk of falls    -  [place] fall precautions as indicated by assessment   - Educate patient/family on patient safety including physical limitations  - Instruct patient to call for assistance with activity based on assessment  - Modify environment to reduce risk of injury  - Consider OT/PT consult to assist with strengthening/mobility  Outcome: Progressing

## 2021-04-28 DIAGNOSIS — E78.5 HYPERLIPIDEMIA LDL GOAL <70: ICD-10-CM

## 2021-04-28 LAB
HPV HR 12 DNA CVX QL NAA+PROBE: NEGATIVE
HPV16 DNA CVX QL NAA+PROBE: NEGATIVE
HPV18 DNA CVX QL NAA+PROBE: NEGATIVE

## 2021-04-29 ENCOUNTER — IMMUNIZATIONS (OUTPATIENT)
Dept: FAMILY MEDICINE CLINIC | Facility: HOSPITAL | Age: 21
End: 2021-04-29

## 2021-04-29 DIAGNOSIS — Z23 ENCOUNTER FOR IMMUNIZATION: Primary | ICD-10-CM

## 2021-04-29 PROCEDURE — 91300 SARS-COV-2 / COVID-19 MRNA VACCINE (PFIZER-BIONTECH) 30 MCG: CPT

## 2021-04-29 PROCEDURE — 0001A SARS-COV-2 / COVID-19 MRNA VACCINE (PFIZER-BIONTECH) 30 MCG: CPT

## 2021-05-11 RX ORDER — ATORVASTATIN CALCIUM 20 MG/1
TABLET, FILM COATED ORAL
Qty: 30 TABLET | Refills: 5 | OUTPATIENT
Start: 2021-05-11

## 2021-05-21 ENCOUNTER — IMMUNIZATIONS (OUTPATIENT)
Dept: FAMILY MEDICINE CLINIC | Facility: HOSPITAL | Age: 21
End: 2021-05-21

## 2021-05-21 DIAGNOSIS — Z23 ENCOUNTER FOR IMMUNIZATION: Primary | ICD-10-CM

## 2021-05-21 PROCEDURE — 0002A SARS-COV-2 / COVID-19 MRNA VACCINE (PFIZER-BIONTECH) 30 MCG: CPT

## 2021-05-21 PROCEDURE — 91300 SARS-COV-2 / COVID-19 MRNA VACCINE (PFIZER-BIONTECH) 30 MCG: CPT

## 2021-05-25 ENCOUNTER — HOSPITAL ENCOUNTER (OUTPATIENT)
Dept: INFUSION CENTER | Facility: CLINIC | Age: 21
Discharge: HOME | End: 2021-05-25
Payer: COMMERCIAL

## 2021-05-25 VITALS
TEMPERATURE: 97.2 F | RESPIRATION RATE: 18 BRPM | SYSTOLIC BLOOD PRESSURE: 146 MMHG | DIASTOLIC BLOOD PRESSURE: 79 MMHG | HEART RATE: 89 BPM

## 2021-05-25 DIAGNOSIS — D50.0 IRON DEFICIENCY ANEMIA DUE TO CHRONIC BLOOD LOSS: Primary | ICD-10-CM

## 2021-05-25 PROCEDURE — 96365 THER/PROPH/DIAG IV INF INIT: CPT

## 2021-05-25 RX ORDER — SODIUM CHLORIDE 9 MG/ML
20 INJECTION, SOLUTION INTRAVENOUS ONCE
Status: CANCELLED | OUTPATIENT
Start: 2021-06-22

## 2021-05-25 RX ORDER — SODIUM CHLORIDE 9 MG/ML
20 INJECTION, SOLUTION INTRAVENOUS ONCE
Status: COMPLETED | OUTPATIENT
Start: 2021-05-25 | End: 2021-05-25

## 2021-05-25 RX ADMIN — SODIUM CHLORIDE 20 ML/HR: 0.9 INJECTION, SOLUTION INTRAVENOUS at 10:20

## 2021-05-25 RX ADMIN — SODIUM CHLORIDE 200 MG: 9 INJECTION, SOLUTION INTRAVENOUS at 10:43

## 2021-05-25 NOTE — PLAN OF CARE
Problem: Potential for Falls  Goal: Patient will remain free of falls  Description: INTERVENTIONS:  - Assess patient frequently for physical needs  -  Identify cognitive and physical deficits and behaviors that affect risk of falls    -  [place] fall precautions as indicated by assessment   - Educate patient/family on patient safety including physical limitations  - Instruct patient to call for assistance with activity based on assessment  - Modify environment to reduce risk of injury  - Consider OT/PT consult to assist with strengthening/mobility  Outcome: Progressing

## 2021-06-21 ENCOUNTER — TELEPHONE (OUTPATIENT)
Dept: FAMILY MEDICINE CLINIC | Facility: CLINIC | Age: 21
End: 2021-06-21

## 2021-06-21 DIAGNOSIS — Z20.822 COVID-19 RULED OUT: Primary | ICD-10-CM

## 2021-06-21 NOTE — TELEPHONE ENCOUNTER
Spoke with the patient who is having symptoms suggestive of COVID-19  Pt is not having any shortness of breath, excessive fatigue or very high fevers  A COVID test was ordered and the patient will remain on isolation until results are available which is typically between 2-3 days  Pt will make a sooner appointment if symptoms worsen before test results are available  Symptoms suggestive of COVID-19 are fevers, chills, body aches, fatigue, loss of sense of smell or taste, abd pain, nausea, vomiting diarrhea

## 2021-06-23 PROCEDURE — 87635 SARS-COV-2 COVID-19 AMP PRB: CPT | Performed by: FAMILY MEDICINE

## 2021-06-25 ENCOUNTER — TELEPHONE (OUTPATIENT)
Dept: FAMILY MEDICINE CLINIC | Facility: CLINIC | Age: 21
End: 2021-06-25

## 2021-07-06 ENCOUNTER — TELEPHONE (OUTPATIENT)
Dept: FAMILY MEDICINE CLINIC | Facility: CLINIC | Age: 21
End: 2021-07-06

## 2021-07-27 ENCOUNTER — HOSPITAL ENCOUNTER (OUTPATIENT)
Dept: INFUSION CENTER | Facility: CLINIC | Age: 21
Discharge: HOME | End: 2021-07-27
Payer: COMMERCIAL

## 2021-07-27 VITALS
RESPIRATION RATE: 18 BRPM | SYSTOLIC BLOOD PRESSURE: 121 MMHG | TEMPERATURE: 97.7 F | HEART RATE: 92 BPM | DIASTOLIC BLOOD PRESSURE: 71 MMHG

## 2021-07-27 DIAGNOSIS — D50.0 IRON DEFICIENCY ANEMIA DUE TO CHRONIC BLOOD LOSS: Primary | ICD-10-CM

## 2021-07-27 PROCEDURE — 96365 THER/PROPH/DIAG IV INF INIT: CPT

## 2021-07-27 RX ORDER — SODIUM CHLORIDE 9 MG/ML
20 INJECTION, SOLUTION INTRAVENOUS ONCE
Status: COMPLETED | OUTPATIENT
Start: 2021-07-27 | End: 2021-07-27

## 2021-07-27 RX ORDER — SODIUM CHLORIDE 9 MG/ML
20 INJECTION, SOLUTION INTRAVENOUS ONCE
Status: CANCELLED | OUTPATIENT
Start: 2021-08-24

## 2021-07-27 RX ADMIN — SODIUM CHLORIDE 200 MG: 9 INJECTION, SOLUTION INTRAVENOUS at 08:33

## 2021-07-27 RX ADMIN — SODIUM CHLORIDE 20 ML/HR: 0.9 INJECTION, SOLUTION INTRAVENOUS at 08:30

## 2021-07-27 NOTE — PROGRESS NOTES
Pt arrived to unit without complaint, tolerated Venofer infusion without any adverse reaction  Pt left unit in stable condition without question or concern, AVS declined, appts made

## 2021-07-27 NOTE — PLAN OF CARE
Problem: Potential for Falls  Goal: Patient will remain free of falls  Description: INTERVENTIONS:  - Educate patient/family on patient safety including physical limitations  - Instruct patient to call for assistance with activity   - Consult OT/PT to assist with strengthening/mobility   - Keep Call bell within reach  - Keep bed low and locked with side rails adjusted as appropriate  - Keep care items and personal belongings within reach  - Initiate and maintain comfort rounds  - Make Fall Risk Sign visible to staff  ours, in advance of need  -- Apply yellow socks and bracelet for high fall risk patients  - Consider moving patient to room near nurses station  Outcome: Progressing

## 2021-08-04 ENCOUNTER — HOSPITAL ENCOUNTER (EMERGENCY)
Facility: HOSPITAL | Age: 21
Discharge: HOME | End: 2021-08-04
Attending: EMERGENCY MEDICINE | Admitting: EMERGENCY MEDICINE
Payer: COMMERCIAL

## 2021-08-04 VITALS
OXYGEN SATURATION: 99 % | BODY MASS INDEX: 56.64 KG/M2 | WEIGHT: 293 LBS | TEMPERATURE: 99.2 F | HEART RATE: 90 BPM | RESPIRATION RATE: 18 BRPM | SYSTOLIC BLOOD PRESSURE: 152 MMHG | DIASTOLIC BLOOD PRESSURE: 74 MMHG

## 2021-08-04 DIAGNOSIS — M54.9 BACK PAIN: Primary | ICD-10-CM

## 2021-08-04 DIAGNOSIS — M54.9 MUSCULOSKELETAL BACK PAIN: ICD-10-CM

## 2021-08-04 PROCEDURE — 99283 EMERGENCY DEPT VISIT LOW MDM: CPT

## 2021-08-04 PROCEDURE — 99284 EMERGENCY DEPT VISIT MOD MDM: CPT | Performed by: EMERGENCY MEDICINE

## 2021-08-04 PROCEDURE — 96372 THER/PROPH/DIAG INJ SC/IM: CPT

## 2021-08-04 RX ORDER — METHOCARBAMOL 500 MG/1
500 TABLET, FILM COATED ORAL 2 TIMES DAILY
Qty: 6 TABLET | Refills: 0 | Status: SHIPPED | OUTPATIENT
Start: 2021-08-04 | End: 2021-11-12 | Stop reason: ALTCHOICE

## 2021-08-04 RX ORDER — IBUPROFEN 400 MG/1
400 TABLET ORAL EVERY 6 HOURS PRN
Qty: 12 TABLET | Refills: 0 | Status: SHIPPED | OUTPATIENT
Start: 2021-08-04 | End: 2021-11-12

## 2021-08-04 RX ORDER — KETOROLAC TROMETHAMINE 30 MG/ML
15 INJECTION, SOLUTION INTRAMUSCULAR; INTRAVENOUS ONCE
Status: COMPLETED | OUTPATIENT
Start: 2021-08-04 | End: 2021-08-04

## 2021-08-04 RX ORDER — LIDOCAINE 50 MG/G
1 PATCH TOPICAL ONCE
Status: DISCONTINUED | OUTPATIENT
Start: 2021-08-04 | End: 2021-08-04 | Stop reason: HOSPADM

## 2021-08-04 RX ADMIN — LIDOCAINE 1 PATCH: 50 PATCH TOPICAL at 01:58

## 2021-08-04 RX ADMIN — KETOROLAC TROMETHAMINE 15 MG: 30 INJECTION, SOLUTION INTRAMUSCULAR; INTRAVENOUS at 01:57

## 2021-08-04 NOTE — ED NOTES
Pt took flexeril 10 mg at midnight     Molly Lagos, [facility]  08/04/21 7821

## 2021-08-04 NOTE — ED PROVIDER NOTES
History  Chief Complaint   Patient presents with    Back Pain     PT REPORTS ACUTE ONSET OF BACK PAIN AFTER TAKING A NAP ON THE FLOOR, THEN REPORTED SHE WAS UNABLE TO GET SELF OFF THE FLOOR DUE TO EXTREME PAIN IN LEFT LOWER BACK, NO TROUBLE URINATING, NO TRAUMA     21F PMH obesity, type 2 diabetes presents to the emergency department with acute lower back pain  She reports around 7:00 p m  today she was taking a nap on the floor and woke up with acute pain in the lower back that radiates down the left leg  She denies trauma or falls  She denies current numbness or weakness but has pain with moving the left leg and bearing weight on the left leg  She denies urinary retention or incontinence and urinated normally approximately 30 minutes prior to arrival   She denies bowel incontinence  She denies fevers  She reports she has had similar episodes of back pain in the past that were less intense and resolved on their own, she does not have daily chronic back pain  She tried 10 milligrams of Flexeril 1 hour prior to arrival but it did not help  Back Pain  Associated symptoms: headaches    Associated symptoms: no abdominal pain, no chest pain, no dysuria, no fever, no numbness and no weakness        Prior to Admission Medications   Prescriptions Last Dose Informant Patient Reported? Taking?    CVS D3 125 MCG (5000 UT) capsule  Self No Yes   Sig: TAKE 1 CAPSULE (5,000 UNITS TOTAL) BY MOUTH DAILY   Empagliflozin (Jardiance) 25 MG TABS Not Taking at Unknown time  No No   Sig: Take 1 tablet (25 mg total) by mouth every morning   Patient not taking: Reported on 8/4/2021   albuterol (VENTOLIN HFA) 90 mcg/act inhaler Not Taking at Unknown time Self No No   Sig: Inhale 2 puffs every 6 (six) hours as needed for wheezing   Patient not taking: Reported on 8/4/2021   ascorbic acid (VITAMIN C) 250 mg tablet  Self No Yes   Sig: Take 1 tablet (250 mg total) by mouth daily   cyanocobalamin (VITAMIN B-12) 1000 MCG tablet  Self No Yes   Sig: Take 1 tablet (1,000 mcg total) by mouth daily   cyclobenzaprine (FLEXERIL) 10 mg tablet 8/4/2021 at 0000 Self No Yes   Sig: Take 1 tablet (10 mg total) by mouth 2 (two) times a day as needed for muscle spasms   escitalopram (LEXAPRO) 5 mg tablet  Self No No   Sig: Take 1 tablet (5 mg total) by mouth daily   etonogestrel-ethinyl estradiol (NUVARING) 0 12-0 015 MG/24HR vaginal ring  Self No Yes   Sig: Insert vaginally and leave in place for 3 consecutive weeks, then remove for 1 week     fenofibrate (TRICOR) 145 mg tablet  Self No Yes   Sig: Take 1 tablet (145 mg total) by mouth daily   ferrous sulfate 325 (65 Fe) mg tablet  Self No Yes   Sig: Take 1 tablet (325 mg total) by mouth 2 (two) times a day with meals   lisinopril (ZESTRIL) 2 5 mg tablet Not Taking at Unknown time Self No No   Sig: Take 1 tablet (2 5 mg total) by mouth daily   Patient not taking: Reported on 7/27/2021   lovastatin (ALTOPREV) 40 MG 24 hr tablet Not Taking at Unknown time Self No No   Sig: Take 1 tablet (40 mg total) by mouth daily at bedtime   Patient not taking: Reported on 8/4/2021   medroxyPROGESTERone (PROVERA) 10 mg tablet  Self No No   Sig: Take 1 tablet (10 mg total) by mouth daily for 10 days   Patient not taking: Reported on 2/18/2020   metFORMIN (GLUCOPHAGE) 1000 MG tablet  Self No Yes   Sig: Take 1 tablet (1,000 mg total) by mouth 2 (two) times a day with meals   omega-3-acid ethyl esters (LOVAZA) 1 g capsule  Self No Yes   Sig: Take 2 capsules (2 g total) by mouth 2 (two) times a day   terconazole (TERAZOL 7) 0 4 % vaginal cream Not Taking at Unknown time Self No No   Sig: Insert 1 applicator into the vagina daily at bedtime   Patient not taking: Reported on 8/4/2021      Facility-Administered Medications: None       Past Medical History:   Diagnosis Date    Anemia     Anxiety     Asthma     childhood    Chronic headache     Depression     Elevated LFTs     High cholesterol     Hyperlipidemia     Pre-diabetes     Scoliosis     Self-injurious behavior     when 15 y/o    Sleep apnea        Past Surgical History:   Procedure Laterality Date    BREAST CYST INCISION AND DRAINAGE Right 7/25/2017    Procedure: INCISION AND DRAINAGE (I&D) BREAST;  Surgeon: Ramón Fan DO;  [facility];  Service: General    BREAST SURGERY  2017    TONSILLECTOMY      TONSILLECTOMY  2016       Family History   Problem Relation Age of Onset    Hypertension Mother    Eleanor Tuttle Hyperlipidemia Mother    Eleanor Tuttle Diabetes Mother     Depression Mother     Thyroid disease Sister    Eleanor Tuttle Anemia Sister     Depression Sister     Anxiety disorder Sister     Obesity Family     Allergies Family     Diabetes Family     Appendicitis Maternal Grandmother     Aneurysm Paternal Grandfather     Anemia Sister     Anemia Sister     Prostate cancer Paternal Uncle     Substance Abuse Neg Hx     Mental illness Neg Hx     Stroke Neg Hx      I have reviewed and agree with the history as documented  E-Cigarette/Vaping    E-Cigarette Use Never User      E-Cigarette/Vaping Substances    Nicotine No     THC No     CBD No     Flavoring No     Other No     Unknown No      Social History     Tobacco Use    Smoking status: Never Smoker    Smokeless tobacco: Never Used   Vaping Use    Vaping Use: Never used   Substance Use Topics    Alcohol use: No    Drug use: Not Currently        Review of Systems   Constitutional: Negative for fever  HENT: Negative for congestion and rhinorrhea  Eyes: Negative for visual disturbance  Respiratory: Negative for shortness of breath  Cardiovascular: Negative for chest pain  Gastrointestinal: Negative for abdominal pain, diarrhea, nausea and vomiting  Genitourinary: Negative for difficulty urinating and dysuria  Musculoskeletal: Positive for back pain  Skin: Negative for rash  Neurological: Positive for light-headedness and headaches  Negative for weakness and numbness         Physical Exam  ED Triage Vitals [08/04/21 0111]   Temperature Pulse Respirations Blood Pressure SpO2   99 2 °F (37 3 °C) (!) 128 22 157/91 98 %      Temp Source Heart Rate Source Patient Position - Orthostatic VS BP Location FiO2 (%)   Oral Monitor Sitting Right arm --      Pain Score       Worst Possible Pain             Orthostatic Vital Signs  Vitals:    08/04/21 0111 08/04/21 0241   BP: 157/91 152/74   Pulse: (!) 128 90   Patient Position - Orthostatic VS: Sitting Lying       Physical Exam  Vitals and nursing note reviewed  Constitutional:       General: She is in acute distress (Pain)  HENT:      Head: Normocephalic and atraumatic  Eyes:      Pupils: Pupils are equal, round, and reactive to light  Cardiovascular:      Rate and Rhythm: Regular rhythm  Tachycardia present  Heart sounds: Normal heart sounds  No murmur heard  Pulmonary:      Effort: Pulmonary effort is normal  No respiratory distress  Breath sounds: Normal breath sounds  Abdominal:      General: Abdomen is flat  There is no distension  Palpations: Abdomen is soft  Tenderness: There is no abdominal tenderness  Musculoskeletal:      Comments: Tenderness to palpation along midline thoracic, lumbar, sacral area  Tender to palpation in left lumbar paraspinal area  Pain with range of motion of left lower extremity  Skin:     General: Skin is warm and dry  Neurological:      Mental Status: She is alert  Comments: Motor strength symmetric in bilateral lower extremities  Light touch sensation intact and symmetric in bilateral lower extremities           ED Medications   Medications   ketorolac (TORADOL) injection 15 mg (15 mg Intramuscular Given 8/4/21 0157)       Diagnostic Studies  Results Reviewed     None                 No orders to display         Procedures  Procedures      ED Course                                       MDM  Number of Diagnoses or Management Options  Back pain  Musculoskeletal back pain  Diagnosis management comments: 21F PMH obesity, diabetes presented to the emergency department with lower back pain that radiates to the left leg  Differential including lumbar radiculopathy, muscle spasm, musculoskeletal strain  Workup including vital signs and physical exam   Exam without findings concerning for spinal cord compression  Treatment including analgesia, pain improved in the emergency department  Stable for discharge home with plan for follow-up at [facility]  Discharge instructions and return precautions given  Disposition  Final diagnoses:   Back pain   Musculoskeletal back pain     Time reflects when diagnosis was documented in both MDM as applicable and the Disposition within this note     Time User Action Codes Description Comment    8/4/2021  3:18 AM Moriah Caal Add [M54 9] Back pain     8/4/2021  3:18 AM Mayer Goldberg Add [M54 9] Musculoskeletal back pain       ED Disposition     ED Disposition Condition Date/Time Comment    Discharge Stable Wed Aug 4, 2021  3:32 AM Shanda Roberson discharge to home/self care              Follow-up Information     Follow up With Specialties Details Why Contact Info Additional Information    [facility] Physical Therapy In 1 week  [phone]          Discharge Medication List as of 8/4/2021  3:32 AM      START taking these medications    Details   ibuprofen (MOTRIN) 400 mg tablet Take 1 tablet (400 mg total) by mouth every 6 (six) hours as needed for mild pain for up to 3 days, Starting Wed 8/4/2021, Until Sat 8/7/2021 at 2359, Normal      methocarbamol (ROBAXIN) 500 mg tablet Take 1 tablet (500 mg total) by mouth 2 (two) times a day for 3 days, Starting Wed 8/4/2021, Until Sat 8/7/2021, Normal         CONTINUE these medications which have NOT CHANGED    Details   albuterol (VENTOLIN HFA) 90 mcg/act inhaler Inhale 2 puffs every 6 (six) hours as needed for wheezing, Starting Thu 1/3/2019, Normal      ascorbic acid (VITAMIN C) 250 mg tablet Take 1 tablet (250 mg total) by mouth daily, Starting Tue 2/4/2020, Normal      CVS D3 125 MCG (5000 UT) capsule TAKE 1 CAPSULE (5,000 UNITS TOTAL) BY MOUTH DAILY, Normal      cyanocobalamin (VITAMIN B-12) 1000 MCG tablet Take 1 tablet (1,000 mcg total) by mouth daily, Starting Tue 2/4/2020, Normal      cyclobenzaprine (FLEXERIL) 10 mg tablet Take 1 tablet (10 mg total) by mouth 2 (two) times a day as needed for muscle spasms, Starting Sat 1/2/2021, Print      Empagliflozin (Jardiance) 25 MG TABS Take 1 tablet (25 mg total) by mouth every morning, Starting Tue 4/27/2021, Normal      escitalopram (LEXAPRO) 5 mg tablet Take 1 tablet (5 mg total) by mouth daily, Starting Tue 2/18/2020, Until Mon 4/19/2021, Normal      etonogestrel-ethinyl estradiol (NUVARING) 0 12-0 015 MG/24HR vaginal ring Insert vaginally and leave in place for 3 consecutive weeks, then remove for 1 week , Normal      fenofibrate (TRICOR) 145 mg tablet Take 1 tablet (145 mg total) by mouth daily, Starting Tue 3/30/2021, Normal      ferrous sulfate 325 (65 Fe) mg tablet Take 1 tablet (325 mg total) by mouth 2 (two) times a day with meals, Starting Tue 2/4/2020, Normal      lisinopril (ZESTRIL) 2 5 mg tablet Take 1 tablet (2 5 mg total) by mouth daily, Starting Tue 3/30/2021, Normal      lovastatin (ALTOPREV) 40 MG 24 hr tablet Take 1 tablet (40 mg total) by mouth daily at bedtime, Starting Tue 3/30/2021, Normal      medroxyPROGESTERone (PROVERA) 10 mg tablet Take 1 tablet (10 mg total) by mouth daily for 10 days, Starting Mon 2/3/2020, Until Thu 2/13/2020, Normal      metFORMIN (GLUCOPHAGE) 1000 MG tablet Take 1 tablet (1,000 mg total) by mouth 2 (two) times a day with meals, Starting Tue 3/30/2021, Normal      omega-3-acid ethyl esters (LOVAZA) 1 g capsule Take 2 capsules (2 g total) by mouth 2 (two) times a day, Starting Tue 3/30/2021, Normal      terconazole (TERAZOL 7) 0 4 % vaginal cream Insert 1 applicator into the vagina daily at bedtime, Starting Wed 4/7/2021, Normal           No discharge procedures on file  PDMP Review     None           ED Provider  Attending physically available and evaluated Glenn Alanis I managed the patient along with the ED Attending      Electronically Signed by         Delma Sauceda MD  08/05/21 8238

## 2021-08-04 NOTE — ED ATTENDING ATTESTATION
8/4/2021  I, Alejandra Hughes MD, saw and evaluated the patient  I have discussed the patient with the resident/non-physician practitioner and agree with the resident's/non-physician practitioner's findings, Plan of Care, and MDM as documented in the resident's/non-physician practitioner's note, except where noted  All available labs and Radiology studies were reviewed  I was present for key portions of any procedure(s) performed by the resident/non-physician practitioner and I was immediately available to provide assistance  At this point I agree with the current assessment done in the Emergency Department  I have conducted an independent evaluation of this patient a history and physical is as follows:    ED Course      Emergency Department Note- Karyn Guzman 24 y o  female MRN: [id number]    Unit/Bed#: ED 16 Encounter: [id number]    Karyn Guzman is a 24 y o  female who presents with   Chief Complaint   Patient presents with    Back Pain     PT REPORTS ACUTE ONSET OF BACK PAIN AFTER TAKING A NAP ON THE FLOOR, THEN REPORTED SHE WAS UNABLE TO GET SELF OFF THE FLOOR DUE TO EXTREME PAIN IN LEFT LOWER BACK, NO TROUBLE URINATING, NO TRAUMA         History of Present Illness   HPI:  Karyn Guzmna is a 24 y o  female who presents for evaluation of:  Abrupt onset of back pain after sleeping on the floor tonight  Patient denies any direct trauma to her back and did not take any pain medications at home to relieve the back pain  Patient notes severe, sharp, lower left back pain  Patient has a h/o back pain  Review of Systems   Constitutional: Negative for chills and fever  HENT: Negative for congestion and rhinorrhea  Respiratory: Negative for cough and shortness of breath  Musculoskeletal: Positive for back pain  Negative for neck pain  All other systems reviewed and are negative        Historical Information   Past Medical History:   Diagnosis Date    Anemia     Anxiety     Asthma childhood    Chronic headache     Depression     Elevated LFTs     High cholesterol     Hyperlipidemia     Pre-diabetes     Scoliosis     Self-injurious behavior     when 15 y/o    Sleep apnea      Past Surgical History:   Procedure Laterality Date    BREAST CYST INCISION AND DRAINAGE Right 7/25/2017    Procedure: INCISION AND DRAINAGE (I&D) BREAST;  Surgeon: Nabila De La Fuente DO;  [facility];  Service: General    BREAST SURGERY  2017    TONSILLECTOMY      TONSILLECTOMY  2016     Social History   Social History     Substance and Sexual Activity   Alcohol Use No     Social History     Substance and Sexual Activity   Drug Use Not Currently     Social History     Tobacco Use   Smoking Status Never Smoker   Smokeless Tobacco Never Used     Family History:   Family History   Problem Relation Age of Onset    Hypertension Mother     Hyperlipidemia Mother     Diabetes Mother     Depression Mother     Thyroid disease Sister     Anemia Sister     Depression Sister     Anxiety disorder Sister     Obesity Family     Allergies Family     Diabetes Family     Appendicitis Maternal Grandmother     Aneurysm Paternal Grandfather     Anemia Sister     Anemia Sister     Prostate cancer Paternal Uncle     Substance Abuse Neg Hx     Mental illness Neg Hx     Stroke Neg Hx        Meds/Allergies   PTA meds:   Prior to Admission Medications   Prescriptions Last Dose Informant Patient Reported? Taking?    CVS D3 125 MCG (5000 UT) capsule  Self No Yes   Sig: TAKE 1 CAPSULE (5,000 UNITS TOTAL) BY MOUTH DAILY   Empagliflozin (Jardiance) 25 MG TABS Not Taking at Unknown time  No No   Sig: Take 1 tablet (25 mg total) by mouth every morning   Patient not taking: Reported on 8/4/2021   albuterol (VENTOLIN HFA) 90 mcg/act inhaler Not Taking at Unknown time Self No No   Sig: Inhale 2 puffs every 6 (six) hours as needed for wheezing   Patient not taking: Reported on 8/4/2021   ascorbic acid (VITAMIN C) 250 mg tablet  Self No Yes   Sig: Take 1 tablet (250 mg total) by mouth daily   cyanocobalamin (VITAMIN B-12) 1000 MCG tablet  Self No Yes   Sig: Take 1 tablet (1,000 mcg total) by mouth daily   cyclobenzaprine (FLEXERIL) 10 mg tablet 8/4/2021 at 0000 Self No Yes   Sig: Take 1 tablet (10 mg total) by mouth 2 (two) times a day as needed for muscle spasms   escitalopram (LEXAPRO) 5 mg tablet  Self No No   Sig: Take 1 tablet (5 mg total) by mouth daily   etonogestrel-ethinyl estradiol (NUVARING) 0 12-0 015 MG/24HR vaginal ring  Self No Yes   Sig: Insert vaginally and leave in place for 3 consecutive weeks, then remove for 1 week     fenofibrate (TRICOR) 145 mg tablet  Self No Yes   Sig: Take 1 tablet (145 mg total) by mouth daily   ferrous sulfate 325 (65 Fe) mg tablet  Self No Yes   Sig: Take 1 tablet (325 mg total) by mouth 2 (two) times a day with meals   lisinopril (ZESTRIL) 2 5 mg tablet Not Taking at Unknown time Self No No   Sig: Take 1 tablet (2 5 mg total) by mouth daily   Patient not taking: Reported on 7/27/2021   lovastatin (ALTOPREV) 40 MG 24 hr tablet Not Taking at Unknown time Self No No   Sig: Take 1 tablet (40 mg total) by mouth daily at bedtime   Patient not taking: Reported on 8/4/2021   medroxyPROGESTERone (PROVERA) 10 mg tablet  Self No No   Sig: Take 1 tablet (10 mg total) by mouth daily for 10 days   Patient not taking: Reported on 2/18/2020   metFORMIN (GLUCOPHAGE) 1000 MG tablet  Self No Yes   Sig: Take 1 tablet (1,000 mg total) by mouth 2 (two) times a day with meals   omega-3-acid ethyl esters (LOVAZA) 1 g capsule  Self No Yes   Sig: Take 2 capsules (2 g total) by mouth 2 (two) times a day   terconazole (TERAZOL 7) 0 4 % vaginal cream Not Taking at Unknown time Self No No   Sig: Insert 1 applicator into the vagina daily at bedtime   Patient not taking: Reported on 8/4/2021      Facility-Administered Medications: None     No Known Allergies    Objective   First Vitals:   Blood Pressure: 157/91 (21)  Pulse: (!) 128 (21)  Temperature: 99 2 °F (37 3 °C) (21)  Temp Source: Oral (21)  Respirations: 22 (21)  Weight - Scale: (!) 150 kg (330 lb) (21)  SpO2: 98 % (21)    Current Vitals:   Blood Pressure: 152/74 (21)  Pulse: 90 (21)  Temperature: 99 2 °F (37 3 °C) (21)  Temp Source: Oral (21)  Respirations: 18 (21)  Weight - Scale: (!) 150 kg (330 lb) (21)  SpO2: 99 % (21)    No intake or output data in the 24 hours ending 21 0305    Invasive Devices     None                 Physical Exam  Vitals and nursing note reviewed  Constitutional:       General: She is not in acute distress  Appearance: Normal appearance  She is obese  HENT:      Head: Normocephalic and atraumatic  Right Ear: External ear normal       Left Ear: External ear normal       Nose: Nose normal       Mouth/Throat:      Mouth: Mucous membranes are moist       Pharynx: Oropharynx is clear  Eyes:      Conjunctiva/sclera: Conjunctivae normal       Pupils: Pupils are equal, round, and reactive to light  Cardiovascular:      Rate and Rhythm: Normal rate and regular rhythm  Pulmonary:      Effort: Pulmonary effort is normal  No respiratory distress  Abdominal:      General: Abdomen is flat  Bowel sounds are normal    Musculoskeletal:         General: No tenderness  Normal range of motion  Skin:     General: Skin is warm and dry  Capillary Refill: Capillary refill takes less than 2 seconds  Neurological:      General: No focal deficit present  Mental Status: She is alert and oriented to person, place, and time  Mental status is at baseline  Psychiatric:         Mood and Affect: Mood normal          Behavior: Behavior normal          Thought Content: Thought content normal          Judgment: Judgment normal            Medical Decision Makin   Acute back pain: patient feels much better after treatment in the ED    No results found for this or any previous visit (from the past 36 hour(s))  No orders to display         Portions of the record may have been created with voice recognition software  Occasional wrong word or "sound a like" substitutions may have occurred due to the inherent limitations of voice recognition software  Read the chart carefully and recognize, using context, where substitutions have occurred            Critical Care Time  Procedures

## 2021-08-04 NOTE — DISCHARGE INSTRUCTIONS
You were evaluated in the emergency department today for back pain  We sent a prescription for a muscle relaxant and ibuprofen for pain as needed  We have provided a referral for the [facility] which you can call to schedule an appointment to follow-up with  Return to the emergency department if symptoms worsen, pain worsens, numbness or weakness of the legs or back, incontinence or inability to urinate or have a bowel movement, fever, or any other symptoms that concern you

## 2021-08-18 ENCOUNTER — TELEPHONE (OUTPATIENT)
Dept: HEMATOLOGY ONCOLOGY | Facility: CLINIC | Age: 21
End: 2021-08-18

## 2021-08-18 DIAGNOSIS — D50.0 IRON DEFICIENCY ANEMIA DUE TO CHRONIC BLOOD LOSS: Primary | ICD-10-CM

## 2021-10-11 ENCOUNTER — TELEPHONE (OUTPATIENT)
Dept: HEMATOLOGY ONCOLOGY | Facility: CLINIC | Age: 21
End: 2021-10-11

## 2021-10-12 ENCOUNTER — APPOINTMENT (OUTPATIENT)
Dept: LAB | Facility: CLINIC | Age: 21
End: 2021-10-12
Payer: COMMERCIAL

## 2021-10-12 DIAGNOSIS — E78.5 HYPERLIPIDEMIA LDL GOAL <70: ICD-10-CM

## 2021-10-12 DIAGNOSIS — E11.9 TYPE 2 DIABETES MELLITUS WITHOUT COMPLICATION, WITHOUT LONG-TERM CURRENT USE OF INSULIN (HCC): ICD-10-CM

## 2021-10-12 DIAGNOSIS — R79.89 ELEVATED TSH: ICD-10-CM

## 2021-10-12 DIAGNOSIS — R79.89 ELEVATED LFTS: ICD-10-CM

## 2021-10-12 DIAGNOSIS — D50.0 IRON DEFICIENCY ANEMIA DUE TO CHRONIC BLOOD LOSS: ICD-10-CM

## 2021-10-12 LAB
BASOPHILS # BLD AUTO: 0.03 THOUSANDS/ΜL (ref 0–0.1)
BASOPHILS NFR BLD AUTO: 0 % (ref 0–1)
EOSINOPHIL # BLD AUTO: 0.13 THOUSAND/ΜL (ref 0–0.61)
EOSINOPHIL NFR BLD AUTO: 2 % (ref 0–6)
ERYTHROCYTE [DISTWIDTH] IN BLOOD BY AUTOMATED COUNT: 14.6 % (ref 11.6–15.1)
FERRITIN SERPL-MCNC: 50 NG/ML (ref 8–388)
HCT VFR BLD AUTO: 38.4 % (ref 34.8–46.1)
HGB BLD-MCNC: 11.9 G/DL (ref 11.5–15.4)
IMM GRANULOCYTES # BLD AUTO: 0.02 THOUSAND/UL (ref 0–0.2)
IMM GRANULOCYTES NFR BLD AUTO: 0 % (ref 0–2)
IRON SATN MFR SERPL: 8 % (ref 15–50)
IRON SERPL-MCNC: 39 UG/DL (ref 50–170)
LYMPHOCYTES # BLD AUTO: 2.91 THOUSANDS/ΜL (ref 0.6–4.47)
LYMPHOCYTES NFR BLD AUTO: 36 % (ref 14–44)
MCH RBC QN AUTO: 25.1 PG (ref 26.8–34.3)
MCHC RBC AUTO-ENTMCNC: 31 G/DL (ref 31.4–37.4)
MCV RBC AUTO: 81 FL (ref 82–98)
MONOCYTES # BLD AUTO: 0.26 THOUSAND/ΜL (ref 0.17–1.22)
MONOCYTES NFR BLD AUTO: 3 % (ref 4–12)
NEUTROPHILS # BLD AUTO: 4.71 THOUSANDS/ΜL (ref 1.85–7.62)
NEUTS SEG NFR BLD AUTO: 59 % (ref 43–75)
NRBC BLD AUTO-RTO: 0 /100 WBCS
PLATELET # BLD AUTO: 254 THOUSANDS/UL (ref 149–390)
PMV BLD AUTO: 12 FL (ref 8.9–12.7)
RBC # BLD AUTO: 4.74 MILLION/UL (ref 3.81–5.12)
TIBC SERPL-MCNC: 477 UG/DL (ref 250–450)
WBC # BLD AUTO: 8.06 THOUSAND/UL (ref 4.31–10.16)

## 2021-10-12 PROCEDURE — 83540 ASSAY OF IRON: CPT

## 2021-10-12 PROCEDURE — 83550 IRON BINDING TEST: CPT

## 2021-10-12 PROCEDURE — 82728 ASSAY OF FERRITIN: CPT

## 2021-10-12 PROCEDURE — 36415 COLL VENOUS BLD VENIPUNCTURE: CPT

## 2021-10-12 PROCEDURE — 85025 COMPLETE CBC W/AUTO DIFF WBC: CPT

## 2021-10-13 ENCOUNTER — TELEPHONE (OUTPATIENT)
Dept: GYNECOLOGY | Facility: CLINIC | Age: 21
End: 2021-10-13

## 2021-10-13 ENCOUNTER — OFFICE VISIT (OUTPATIENT)
Dept: HEMATOLOGY ONCOLOGY | Facility: CLINIC | Age: 21
End: 2021-10-13
Payer: COMMERCIAL

## 2021-10-13 VITALS
HEIGHT: 63 IN | BODY MASS INDEX: 51.91 KG/M2 | WEIGHT: 293 LBS | TEMPERATURE: 98.1 F | DIASTOLIC BLOOD PRESSURE: 88 MMHG | RESPIRATION RATE: 18 BRPM | OXYGEN SATURATION: 99 % | SYSTOLIC BLOOD PRESSURE: 144 MMHG | HEART RATE: 109 BPM

## 2021-10-13 DIAGNOSIS — D50.0 IRON DEFICIENCY ANEMIA DUE TO CHRONIC BLOOD LOSS: Primary | ICD-10-CM

## 2021-10-13 DIAGNOSIS — B37.9 CANDIDIASIS: Primary | ICD-10-CM

## 2021-10-13 PROCEDURE — 3008F BODY MASS INDEX DOCD: CPT | Performed by: PHYSICIAN ASSISTANT

## 2021-10-13 PROCEDURE — 99213 OFFICE O/P EST LOW 20 MIN: CPT | Performed by: PHYSICIAN ASSISTANT

## 2021-10-13 PROCEDURE — 1036F TOBACCO NON-USER: CPT | Performed by: PHYSICIAN ASSISTANT

## 2021-10-13 RX ORDER — FERROUS SULFATE 220 (44)/5
220 ELIXIR ORAL DAILY
COMMUNITY
End: 2021-11-12 | Stop reason: ALTCHOICE

## 2021-10-15 RX ORDER — FLUCONAZOLE 150 MG/1
150 TABLET ORAL
Qty: 2 TABLET | Refills: 0 | Status: SHIPPED | OUTPATIENT
Start: 2021-10-15 | End: 2021-10-19

## 2021-11-02 ENCOUNTER — OFFICE VISIT (OUTPATIENT)
Dept: BARIATRICS | Facility: CLINIC | Age: 21
End: 2021-11-02

## 2021-11-02 VITALS
HEIGHT: 64 IN | DIASTOLIC BLOOD PRESSURE: 84 MMHG | TEMPERATURE: 97.4 F | HEART RATE: 82 BPM | SYSTOLIC BLOOD PRESSURE: 133 MMHG | RESPIRATION RATE: 17 BRPM | BODY MASS INDEX: 50.02 KG/M2 | WEIGHT: 293 LBS

## 2021-11-02 DIAGNOSIS — E11.9 TYPE 2 DIABETES MELLITUS WITHOUT COMPLICATION, WITHOUT LONG-TERM CURRENT USE OF INSULIN (HCC): ICD-10-CM

## 2021-11-02 DIAGNOSIS — E66.9 OBESITY, UNSPECIFIED: Primary | ICD-10-CM

## 2021-11-02 DIAGNOSIS — Z01.818 PREOP TESTING: ICD-10-CM

## 2021-11-02 DIAGNOSIS — G47.33 OBSTRUCTIVE SLEEP APNEA: ICD-10-CM

## 2021-11-02 PROCEDURE — RECHECK

## 2021-11-03 ENCOUNTER — OFFICE VISIT (OUTPATIENT)
Dept: SLEEP CENTER | Facility: CLINIC | Age: 21
End: 2021-11-03
Payer: COMMERCIAL

## 2021-11-03 VITALS
WEIGHT: 293 LBS | DIASTOLIC BLOOD PRESSURE: 72 MMHG | SYSTOLIC BLOOD PRESSURE: 124 MMHG | HEIGHT: 64 IN | BODY MASS INDEX: 50.02 KG/M2

## 2021-11-03 DIAGNOSIS — E66.9 OBESITY, UNSPECIFIED: ICD-10-CM

## 2021-11-03 DIAGNOSIS — Z01.818 PREOP TESTING: ICD-10-CM

## 2021-11-03 DIAGNOSIS — G47.33 OBSTRUCTIVE SLEEP APNEA: ICD-10-CM

## 2021-11-03 DIAGNOSIS — E11.9 TYPE 2 DIABETES MELLITUS WITHOUT COMPLICATION, WITHOUT LONG-TERM CURRENT USE OF INSULIN (HCC): ICD-10-CM

## 2021-11-03 PROCEDURE — 99214 OFFICE O/P EST MOD 30 MIN: CPT | Performed by: INTERNAL MEDICINE

## 2021-11-12 ENCOUNTER — OFFICE VISIT (OUTPATIENT)
Dept: FAMILY MEDICINE CLINIC | Facility: CLINIC | Age: 21
End: 2021-11-12
Payer: COMMERCIAL

## 2021-11-12 VITALS
OXYGEN SATURATION: 98 % | DIASTOLIC BLOOD PRESSURE: 80 MMHG | HEART RATE: 103 BPM | BODY MASS INDEX: 50.02 KG/M2 | RESPIRATION RATE: 18 BRPM | HEIGHT: 64 IN | TEMPERATURE: 98.4 F | SYSTOLIC BLOOD PRESSURE: 120 MMHG | WEIGHT: 293 LBS

## 2021-11-12 DIAGNOSIS — E78.5 HYPERLIPIDEMIA, UNSPECIFIED HYPERLIPIDEMIA TYPE: ICD-10-CM

## 2021-11-12 DIAGNOSIS — D50.0 IRON DEFICIENCY ANEMIA DUE TO CHRONIC BLOOD LOSS: ICD-10-CM

## 2021-11-12 DIAGNOSIS — E55.9 VITAMIN D DEFICIENCY: ICD-10-CM

## 2021-11-12 DIAGNOSIS — E11.65 UNCONTROLLED TYPE 2 DIABETES MELLITUS WITH HYPERGLYCEMIA (HCC): ICD-10-CM

## 2021-11-12 DIAGNOSIS — G47.33 OBSTRUCTIVE SLEEP APNEA: ICD-10-CM

## 2021-11-12 DIAGNOSIS — E53.8 LOW VITAMIN B12 LEVEL: ICD-10-CM

## 2021-11-12 DIAGNOSIS — Z00.00 PHYSICAL EXAM: Primary | ICD-10-CM

## 2021-11-12 DIAGNOSIS — E66.01 MORBID OBESITY WITH BMI OF 50.0-59.9, ADULT (HCC): ICD-10-CM

## 2021-11-12 LAB — SL AMB POCT HEMOGLOBIN AIC: 11.1 (ref ?–6.5)

## 2021-11-12 PROCEDURE — 99395 PREV VISIT EST AGE 18-39: CPT | Performed by: NURSE PRACTITIONER

## 2021-11-12 PROCEDURE — 4010F ACE/ARB THERAPY RXD/TAKEN: CPT | Performed by: NURSE PRACTITIONER

## 2021-11-12 PROCEDURE — 83036 HEMOGLOBIN GLYCOSYLATED A1C: CPT | Performed by: NURSE PRACTITIONER

## 2021-11-12 PROCEDURE — 3046F HEMOGLOBIN A1C LEVEL >9.0%: CPT | Performed by: NURSE PRACTITIONER

## 2021-11-12 PROCEDURE — 1036F TOBACCO NON-USER: CPT | Performed by: NURSE PRACTITIONER

## 2021-11-12 PROCEDURE — 3008F BODY MASS INDEX DOCD: CPT | Performed by: NURSE PRACTITIONER

## 2021-11-12 RX ORDER — BLOOD-GLUCOSE METER
1 KIT MISCELLANEOUS 2 TIMES DAILY
Qty: 1 KIT | Refills: 0 | Status: SHIPPED | OUTPATIENT
Start: 2021-11-12

## 2021-11-12 RX ORDER — LISINOPRIL 2.5 MG/1
2.5 TABLET ORAL DAILY
Qty: 90 TABLET | Refills: 1 | Status: SHIPPED | OUTPATIENT
Start: 2021-11-12

## 2021-11-12 RX ORDER — BLOOD SUGAR DIAGNOSTIC
STRIP MISCELLANEOUS
Qty: 100 STRIP | Refills: 1 | Status: SHIPPED | OUTPATIENT
Start: 2021-11-12

## 2021-11-12 RX ORDER — LANCETS 33 GAUGE
EACH MISCELLANEOUS 2 TIMES DAILY
Qty: 100 EACH | Refills: 1 | Status: SHIPPED | OUTPATIENT
Start: 2021-11-12

## 2021-11-13 PROBLEM — G43.009 MIGRAINE WITHOUT AURA AND WITHOUT STATUS MIGRAINOSUS, NOT INTRACTABLE: Status: RESOLVED | Noted: 2019-08-16 | Resolved: 2021-11-13

## 2021-11-13 PROBLEM — M72.2 PLANTAR FASCIITIS: Status: RESOLVED | Noted: 2020-06-18 | Resolved: 2021-11-13

## 2021-11-15 ENCOUNTER — TELEPHONE (OUTPATIENT)
Dept: FAMILY MEDICINE CLINIC | Facility: CLINIC | Age: 21
End: 2021-11-15

## 2021-11-30 DIAGNOSIS — D50.0 IRON DEFICIENCY ANEMIA DUE TO CHRONIC BLOOD LOSS: ICD-10-CM

## 2021-12-01 ENCOUNTER — TELEPHONE (OUTPATIENT)
Dept: FAMILY MEDICINE CLINIC | Facility: CLINIC | Age: 21
End: 2021-12-01

## 2021-12-01 DIAGNOSIS — E11.65 UNCONTROLLED TYPE 2 DIABETES MELLITUS WITH HYPERGLYCEMIA (HCC): Primary | ICD-10-CM

## 2021-12-01 RX ORDER — FERROUS SULFATE 325(65) MG
TABLET ORAL
Qty: 60 TABLET | Refills: 5 | Status: SHIPPED | OUTPATIENT
Start: 2021-12-01

## 2021-12-02 ENCOUNTER — OFFICE VISIT (OUTPATIENT)
Dept: BARIATRICS | Facility: CLINIC | Age: 21
End: 2021-12-02

## 2021-12-02 DIAGNOSIS — E66.01 OBESITY, CLASS III, BMI 40-49.9 (MORBID OBESITY) (HCC): Primary | ICD-10-CM

## 2021-12-02 DIAGNOSIS — E11.65 UNCONTROLLED TYPE 2 DIABETES MELLITUS WITH HYPERGLYCEMIA (HCC): ICD-10-CM

## 2021-12-02 PROCEDURE — RECHECK: Performed by: SOCIAL WORKER

## 2021-12-07 ENCOUNTER — HOSPITAL ENCOUNTER (OUTPATIENT)
Dept: SLEEP CENTER | Facility: CLINIC | Age: 21
Discharge: HOME | End: 2021-12-07
Payer: COMMERCIAL

## 2021-12-07 ENCOUNTER — TELEPHONE (OUTPATIENT)
Dept: SLEEP CENTER | Facility: CLINIC | Age: 21
End: 2021-12-07

## 2021-12-07 DIAGNOSIS — G47.33 OBSTRUCTIVE SLEEP APNEA: ICD-10-CM

## 2021-12-07 PROCEDURE — 95810 POLYSOM 6/> YRS 4/> PARAM: CPT | Performed by: INTERNAL MEDICINE

## 2021-12-07 PROCEDURE — 95810 POLYSOM 6/> YRS 4/> PARAM: CPT

## 2021-12-07 NOTE — TELEPHONE ENCOUNTER
Patient called and stated that she needs medication to help her sleep  She is scheduled for a sleep study tonight 12/7/2021

## 2021-12-13 ENCOUNTER — TELEPHONE (OUTPATIENT)
Dept: SLEEP CENTER | Facility: CLINIC | Age: 21
End: 2021-12-13

## 2021-12-13 ENCOUNTER — CLINICAL SUPPORT (OUTPATIENT)
Dept: FAMILY MEDICINE CLINIC | Facility: CLINIC | Age: 21
End: 2021-12-13

## 2021-12-13 DIAGNOSIS — E11.65 UNCONTROLLED TYPE 2 DIABETES MELLITUS WITH HYPERGLYCEMIA (HCC): Primary | ICD-10-CM

## 2022-02-24 ENCOUNTER — OFFICE VISIT (OUTPATIENT)
Dept: BARIATRICS | Facility: CLINIC | Age: 22
End: 2022-02-24

## 2022-02-24 DIAGNOSIS — E66.01 OBESITY, CLASS III, BMI 40-49.9 (MORBID OBESITY) (HCC): Primary | ICD-10-CM

## 2022-02-24 PROCEDURE — RECHECK: Performed by: SOCIAL WORKER

## 2022-02-24 NOTE — PROGRESS NOTES
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 and her mother is away for three months  Mental health - She is having second thoughts and wonders if she should ask that on the Facebook group to see if other's have experience that same thing  She is getting a lot of opinions from her friends  She has not completed her D&A evaluation and has stopped smoking marijuana along with her boyfriend  She has continued to drink and on her birthday she shared that she over did it  She is not sure she is ready to give up drinking because she just turned 22 and are finally able to go out with friends since covid has declined  Sh e is interested in getting involved with therapy and took the resource list     Activity - she tried to going to the gym but feels very self conscious  Discussed using walking  Progress toward program requirements  Needs D&A evaluation before she is scheduled with surgeon  Goal for next visit: Wants to increase activity and eat breakfast   Next appointment is scheduled for 3/22 with Paula Katz, MSW, LCSW  _____________________________

## 2022-04-14 ENCOUNTER — TELEPHONE (OUTPATIENT)
Dept: HEMATOLOGY ONCOLOGY | Facility: CLINIC | Age: 22
End: 2022-04-14

## 2022-04-18 ENCOUNTER — TELEPHONE (OUTPATIENT)
Dept: SURGICAL ONCOLOGY | Facility: CLINIC | Age: 22
End: 2022-04-18

## 2022-04-18 NOTE — TELEPHONE ENCOUNTER
Patient did not show up for her appointment today  I left a voicemail with the hopeline to reschedule

## 2022-04-20 ENCOUNTER — OFFICE VISIT (OUTPATIENT)
Dept: URGENT CARE | Age: 22
End: 2022-04-20
Payer: MEDICARE

## 2022-04-20 VITALS — HEIGHT: 62 IN | BODY MASS INDEX: 53.92 KG/M2 | WEIGHT: 293 LBS

## 2022-04-20 DIAGNOSIS — A08.4 VIRAL GASTROENTERITIS: Primary | ICD-10-CM

## 2022-04-20 PROCEDURE — 99213 OFFICE O/P EST LOW 20 MIN: CPT | Performed by: STUDENT IN AN ORGANIZED HEALTH CARE EDUCATION/TRAINING PROGRAM

## 2022-04-20 RX ORDER — DICYCLOMINE HYDROCHLORIDE 10 MG/1
10 CAPSULE ORAL
Qty: 30 CAPSULE | Refills: 0 | Status: SHIPPED | OUTPATIENT
Start: 2022-04-20

## 2022-04-20 NOTE — LETTER
April 20, 2022     Patient: Re Pierre   YOB: 2000   Date of Visit: 4/20/2022       To Whom It May Concern: It is my medical opinion that Re Pierre be excused from work duties from 04/18 till 4/22 of the year 2022    If you have any questions or concerns, please don't hesitate to call           Sincerely,        Stephanie Heredia MD    CC: No Recipients

## 2022-04-20 NOTE — PROGRESS NOTES
[facility]        NAME: Jay Gallo is a 25 y o  female  : 2000    MRN: [id number]  DATE: 2022  TIME: 9:14 AM    Assessment and Plan   Viral gastroenteritis [A08 4]  1  Viral gastroenteritis  dicyclomine (BENTYL) 10 mg capsule         Patient Instructions       Follow up with PCP in 3-5 days  Proceed to  ER if symptoms worsen  Chief Complaint     Chief Complaint   Patient presents with    Abdominal Pain     Since monday, taken pepto bismol  Not helping   Diarrhea     Feeling better since monday  History of Present Illness       HPI   Patient presents complaining abdominal pain ongoing since Monday, S taking Pepto-Bismol with minimal relief  Patient also complains of diarrhea that has started at that time but has been improving  Patient denies any fevers or chills    She is unsure if anybody else has him sick contacts     Review of Systems   Review of Systems  Per hpi     Current Medications       Current Outpatient Medications:     Blood Glucose Monitoring Suppl (OneTouch Verio Reflect) w/Device KIT, Use 1 kit 2 (two) times a day E11 65, Please substitute with appropriate alternative as covered by patient's insurance , Disp: 1 kit, Rfl: 0    etonogestrel-ethinyl estradiol (NUVARING) 0 12-0 015 MG/24HR vaginal ring, Insert vaginally and leave in place for 3 consecutive weeks, then remove for 1 week , Disp: 3 each, Rfl: 3    ferrous sulfate 325 (65 Fe) mg tablet, TAKE 1 TABLET BY MOUTH 2 TIMES A DAY WITH MEALS, Disp: 60 tablet, Rfl: 5    glucose blood (OneTouch Verio) test strip, E11 65, Please substitute with appropriate alternative as covered by patient's insurance , Disp: 100 strip, Rfl: 1    lisinopril (ZESTRIL) 2 5 mg tablet, Take 1 tablet (2 5 mg total) by mouth daily, Disp: 90 tablet, Rfl: 1    metFORMIN (GLUCOPHAGE) 1000 MG tablet, Take 1 tablet (1,000 mg total) by mouth 2 (two) times a day with meals, Disp: 180 tablet, Rfl: 1    [facility]  Jeremy Lancets 33G MISC, Use 2 (two) times a day E11 65, Please substitute with appropriate alternative as covered by patient's insurance , Disp: 100 each, Rfl: 1    zolpidem (AMBIEN) 5 mg tablet, Take 1 tablet (5 mg total) by mouth daily at bedtime as needed for sleep, Disp: 30 tablet, Rfl: 0    dicyclomine (BENTYL) 10 mg capsule, Take 1 capsule (10 mg total) by mouth 4 (four) times a day (before meals and at bedtime), Disp: 30 capsule, Rfl: 0    Dulaglutide (Trulicity) 0 01 RH/7 3LG SOPN, Inject 0 5 mL (0 75 mg total) under the skin once a week (Patient not taking: Reported on 4/20/2022 ), Disp: 2 mL, Rfl: 1    ibuprofen (MOTRIN) 400 mg tablet, Take 1 tablet (400 mg total) by mouth every 6 (six) hours as needed for mild pain for up to 3 days, Disp: 12 tablet, Rfl: 0    Current Allergies     Allergies as of 04/20/2022    (No Known Allergies)            The following portions of the patient's history were reviewed and updated as appropriate: allergies, current medications, past family history, past medical history, past social history, past surgical history and problem list      Past Medical History:   Diagnosis Date    Anxiety     Asthma     childhood    Chronic headache     Dextroscoliosis 4/5/2016    Diabetes mellitus ([facility] [address] )     Elevated LFTs     Migraine without aura and without status migrainosus, not intractable 8/16/2019    Plantar fasciitis 6/18/2020    Pre-diabetes     Self-injurious behavior     when 15 y/o       Past Surgical History:   Procedure Laterality Date    BREAST CYST INCISION AND DRAINAGE Right 07/25/2017    Procedure: INCISION AND DRAINAGE (I&D) BREAST;  Surgeon: Roseann Batres, ;  [facility];  Service: General    TONSILLECTOMY  2016       Family History   Problem Relation Age of Onset    Hypertension Mother    [facility] Hyperlipidemia Mother    [facility] Diabetes Mother     Depression Mother     Thyroid disease Sister     Anemia Sister     Depression Sister     Anxiety disorder Sister     Obesity Family     Allergies Family     Diabetes Family     Appendicitis Maternal Grandmother     Aneurysm Paternal Grandfather     Anemia Sister     Anemia Sister     Prostate cancer Paternal Uncle     Substance Abuse Neg Hx     Mental illness Neg Hx     Stroke Neg Hx          Medications have been verified  Objective   Ht 5' 2" (1 575 m)   Wt (!) 145 kg (320 lb)   BMI 58 53 kg/m²   No LMP recorded  Physical Exam     Physical Exam  Constitutional:       General: She is not in acute distress  Appearance: Normal appearance  HENT:      Head: Normocephalic  Nose: No congestion or rhinorrhea  Mouth/Throat:      Mouth: Mucous membranes are moist       Pharynx: No oropharyngeal exudate or posterior oropharyngeal erythema  Eyes:      General:         Right eye: No discharge  Left eye: No discharge  Conjunctiva/sclera: Conjunctivae normal    Cardiovascular:      Rate and Rhythm: Normal rate and regular rhythm  Pulses: Normal pulses  Pulmonary:      Effort: Pulmonary effort is normal  No respiratory distress  Abdominal:      General: Abdomen is flat  There is no distension  Palpations: Abdomen is soft  Tenderness: There is generalized abdominal tenderness  Musculoskeletal:      Cervical back: Neck supple  Skin:     General: Skin is warm  Capillary Refill: Capillary refill takes less than 2 seconds  Neurological:      Mental Status: She is alert and oriented to person, place, and time

## 2022-06-10 NOTE — BH TREATMENT PLAN
TREATMENT PLAN (Medication Management Only)        [facility]    Name and Date of Birth:  Marcy Avitia 21 y o  2000  Date of Treatment Plan: February 18, 2020  Diagnosis/Diagnoses:  MDD, anxiety  Strengths/Personal Resources for Self-Care: "determined"  Area/Areas of need (in own words): "being more consistent and responsible, depression"  1  Long Term Goal: lose weight  Target Date: 1 year  Person/Persons responsible for completion of goal: Iraida Villasenor  2  Short Term Objective (s) - How will we reach this goal?:   A  Provider new recommended medication/dosage changes and/or continue medication(s): continue current medications as prescribed  B  starting therapy  C  starting kickboxing, eating healthier  Target Date: 2 months - 4/18/2020  Person/Persons Responsible for Completion of Goal: Iraida Villasenor provider  Progress Towards Goals: initiating treatment  Treatment Modality: medication management every 6 weeks, referral for individual psychotherapy  Review dth272 days from date of this plan: 8/18/2020  Expected length of service: ongoing treatment  My Physician/PA/NP and I have developed this plan together and I agree to work on the goals and objectives  I understand the treatment goals that were developed for my treatment  Kirsten Berrios)

## 2022-06-13 ENCOUNTER — OFFICE VISIT (OUTPATIENT)
Dept: URGENT CARE | Age: 22
End: 2022-06-13
Payer: MEDICARE

## 2022-06-13 VITALS
WEIGHT: 293 LBS | OXYGEN SATURATION: 98 % | HEIGHT: 63 IN | TEMPERATURE: 98.7 F | SYSTOLIC BLOOD PRESSURE: 140 MMHG | HEART RATE: 98 BPM | DIASTOLIC BLOOD PRESSURE: 70 MMHG | BODY MASS INDEX: 51.91 KG/M2

## 2022-06-13 DIAGNOSIS — J06.9 UPPER RESPIRATORY TRACT INFECTION, UNSPECIFIED TYPE: ICD-10-CM

## 2022-06-13 DIAGNOSIS — R06.2 WHEEZING: Primary | ICD-10-CM

## 2022-06-13 PROCEDURE — 99213 OFFICE O/P EST LOW 20 MIN: CPT | Performed by: NURSE PRACTITIONER

## 2022-06-13 RX ORDER — PREDNISONE 20 MG/1
20 TABLET ORAL 2 TIMES DAILY WITH MEALS
Qty: 6 TABLET | Refills: 0 | Status: SHIPPED | OUTPATIENT
Start: 2022-06-13 | End: 2022-06-16

## 2022-06-13 RX ORDER — ALBUTEROL SULFATE 90 UG/1
2 AEROSOL, METERED RESPIRATORY (INHALATION) EVERY 6 HOURS PRN
Qty: 18 G | Refills: 0 | Status: SHIPPED | OUTPATIENT
Start: 2022-06-13

## 2022-06-13 RX ORDER — BENZONATATE 200 MG/1
200 CAPSULE ORAL 3 TIMES DAILY PRN
Qty: 20 CAPSULE | Refills: 0 | Status: SHIPPED | OUTPATIENT
Start: 2022-06-13

## 2022-06-13 NOTE — PROGRESS NOTES
[facility]        NAME: Kyle Peng is a 25 y o  female  : 2000    MRN: [id number]  DATE: 2022  TIME: 10:48 AM    Assessment and Plan   Wheezing [R06 2]  1  Wheezing  albuterol (Ventolin HFA) 90 mcg/act inhaler    predniSONE 20 mg tablet   2  Upper respiratory tract infection, unspecified type  benzonatate (TESSALON) 200 MG capsule         Patient Instructions     Take med as directed  Stop use of tylenol; use motrin instead  Follow up with PCP in 3-5 days  Proceed to  ER if symptoms worsen  Chief Complaint     Chief Complaint   Patient presents with    Cold Like Symptoms    Cough    Headache     C/o of being sick for about 1 week with nasal pressure, cough and headache         History of Present Illness       HPI   Reports cold symptoms, and cough, with headache  Duration of symptoms x 1 week  Hx of asthma in childhood  Some chest tightness currently  Review of Systems   Review of Systems   Constitutional: Negative for chills and fever  HENT: Positive for congestion and rhinorrhea  Negative for sore throat  Respiratory: Positive for cough, chest tightness, shortness of breath and wheezing  Cardiovascular: Negative for chest pain  Gastrointestinal: Negative for diarrhea and vomiting  Neurological: Positive for headaches           Current Medications       Current Outpatient Medications:     albuterol (Ventolin HFA) 90 mcg/act inhaler, Inhale 2 puffs every 6 (six) hours as needed for wheezing, Disp: 18 g, Rfl: 0    benzonatate (TESSALON) 200 MG capsule, Take 1 capsule (200 mg total) by mouth 3 (three) times a day as needed for cough, Disp: 20 capsule, Rfl: 0    predniSONE 20 mg tablet, Take 1 tablet (20 mg total) by mouth 2 (two) times a day with meals for 3 days, Disp: 6 tablet, Rfl: 0    Blood Glucose Monitoring Suppl (OneTouch Verio Reflect) w/Device KIT, Use 1 kit 2 (two) times a day E11 65, Please substitute with appropriate alternative as covered by patient's insurance , Disp: 1 kit, Rfl: 0    dicyclomine (BENTYL) 10 mg capsule, Take 1 capsule (10 mg total) by mouth 4 (four) times a day (before meals and at bedtime), Disp: 30 capsule, Rfl: 0    Dulaglutide (Trulicity) 2 09 FX/8 9GU SOPN, Inject 0 5 mL (0 75 mg total) under the skin once a week (Patient not taking: Reported on 4/20/2022 ), Disp: 2 mL, Rfl: 1    etonogestrel-ethinyl estradiol (NUVARING) 0 12-0 015 MG/24HR vaginal ring, Insert vaginally and leave in place for 3 consecutive weeks, then remove for 1 week , Disp: 3 each, Rfl: 3    ferrous sulfate 325 (65 Fe) mg tablet, TAKE 1 TABLET BY MOUTH 2 TIMES A DAY WITH MEALS, Disp: 60 tablet, Rfl: 5    glucose blood (OneTouch Verio) test strip, E11 65, Please substitute with appropriate alternative as covered by patient's insurance , Disp: 100 strip, Rfl: 1    ibuprofen (MOTRIN) 400 mg tablet, Take 1 tablet (400 mg total) by mouth every 6 (six) hours as needed for mild pain for up to 3 days, Disp: 12 tablet, Rfl: 0    lisinopril (ZESTRIL) 2 5 mg tablet, Take 1 tablet (2 5 mg total) by mouth daily, Disp: 90 tablet, Rfl: 1    metFORMIN (GLUCOPHAGE) 1000 MG tablet, Take 1 tablet (1,000 mg total) by mouth 2 (two) times a day with meals, Disp: 180 tablet, Rfl: 1    OneTouch Delica Lancets 53Y MISC, Use 2 (two) times a day E11 65, Please substitute with appropriate alternative as covered by patient's insurance , Disp: 100 each, Rfl: 1    zolpidem (AMBIEN) 5 mg tablet, Take 1 tablet (5 mg total) by mouth daily at bedtime as needed for sleep, Disp: 30 tablet, Rfl: 0    Current Allergies     Allergies as of 06/13/2022    (No Known Allergies)            The following portions of the patient's history were reviewed and updated as appropriate: allergies, current medications, past family history, past medical history, past social history, past surgical history and problem list      Past Medical History:   Diagnosis Date    Anxiety     Asthma     childhood    Chronic headache     Dextroscoliosis 4/5/2016    Diabetes mellitus ([address] )     Elevated LFTs     Migraine without aura and without status migrainosus, not intractable 8/16/2019    Plantar fasciitis 6/18/2020    Pre-diabetes     Self-injurious behavior     when 15 y/o       Past Surgical History:   Procedure Laterality Date    BREAST CYST INCISION AND DRAINAGE Right 07/25/2017    Procedure: INCISION AND DRAINAGE (I&D) BREAST;  Surgeon: Manasa Duran DO;  [facility];  Service: General    TONSILLECTOMY  2016       Family History   Problem Relation Age of Onset    Hypertension Mother    [place] Hyperlipidemia Mother    [place] Diabetes Mother     Depression Mother     Thyroid disease Sister    [place] Anemia Sister     Depression Sister     Anxiety disorder Sister     Obesity Family     Allergies Family     Diabetes Family     Appendicitis Maternal Grandmother     Aneurysm Paternal Grandfather     Anemia Sister     Anemia Sister     Prostate cancer Paternal Uncle     Substance Abuse Neg Hx     Mental illness Neg Hx     Stroke Neg Hx          Medications have been verified  Objective   /70 (BP Location: Right arm, Patient Position: Sitting, Cuff Size: Standard)   Pulse 98   Temp 98 7 °F (37 1 °C) (Axillary)   Ht 5' 3" (1 6 m)   Wt (!) 145 kg (320 lb)   LMP 05/31/2022   SpO2 98%   BMI 56 69 kg/m²   Patient's last menstrual period was 05/31/2022  Physical Exam     Physical Exam  Constitutional:       Appearance: She is obese  She is not ill-appearing or diaphoretic  HENT:      Head:      Comments: NTTP of the frontal and maxillary sinuses     Right Ear: Tympanic membrane normal       Left Ear: Tympanic membrane normal       Nose: Congestion and rhinorrhea present  Mouth/Throat:      Pharynx: No posterior oropharyngeal erythema  Cardiovascular:      Rate and Rhythm: Regular rhythm  Heart sounds: Normal heart sounds     Pulmonary:      Effort: Pulmonary effort is normal  Breath sounds: Wheezing present  Comments: Mild congestion in lungs  Lymphadenopathy:      Cervical: No cervical adenopathy

## 2022-07-13 ENCOUNTER — OFFICE VISIT (OUTPATIENT)
Dept: GYNECOLOGY | Facility: CLINIC | Age: 22
End: 2022-07-13
Payer: MEDICARE

## 2022-07-13 VITALS
HEIGHT: 63 IN | DIASTOLIC BLOOD PRESSURE: 90 MMHG | BODY MASS INDEX: 51.91 KG/M2 | WEIGHT: 293 LBS | HEART RATE: 96 BPM | SYSTOLIC BLOOD PRESSURE: 120 MMHG

## 2022-07-13 DIAGNOSIS — B37.9 CANDIDIASIS: Primary | ICD-10-CM

## 2022-07-13 PROCEDURE — 99212 OFFICE O/P EST SF 10 MIN: CPT | Performed by: OBSTETRICS & GYNECOLOGY

## 2022-07-13 RX ORDER — CLOTRIMAZOLE AND BETAMETHASONE DIPROPIONATE 10; .64 MG/G; MG/G
CREAM TOPICAL 2 TIMES DAILY
Qty: 30 G | Refills: 0 | Status: SHIPPED | OUTPATIENT
Start: 2022-07-13 | End: 2022-08-23 | Stop reason: ALTCHOICE

## 2022-07-13 NOTE — PROGRESS NOTES
Assessment/Plan:         Diagnoses and all orders for this visit:    Candidiasis  -     terconazole (TERAZOL 7) 0 4 % vaginal cream; Insert 1 applicator into the vagina daily at bedtime  -     clotrimazole-betamethasone (LOTRISONE) 1-0 05 % cream; Apply topically 2 (two) times a day        Subjective:      Patient ID: Claudy Reed is a 25 y o  female  HPI  patient presents to the office complaining of vaginal discharge with itching  She has had this for the past 3 days  Denies any fever  Denies any dysuria hematuria or urinary incontinence  She is on no new medications  Last menstrual period was end of June  She discontinued NuvaRing  Patient has recently restarted metformin    The following portions of the patient's history were reviewed and updated as appropriate:   She  has a past medical history of Anxiety, Asthma, Chronic headache, Dextroscoliosis (4/5/2016), Diabetes mellitus ([facility] ), Elevated LFTs, Migraine without aura and without status migrainosus, not intractable (8/16/2019), Plantar fasciitis (6/18/2020), Pre-diabetes, and Self-injurious behavior  She   Patient Active Problem List    Diagnosis Date Noted    Anxiety 02/18/2020    Type 2 diabetes mellitus without complication, without long-term current use of insulin ([facility] ) 02/04/2020    Hyperlipidemia LDL goal <70 02/04/2020    Elevated LFTs 11/07/2019    Iron deficiency anemia due to chronic blood loss 08/08/2019    Low vitamin B12 level 08/08/2019    Abnormal uterine bleeding 02/26/2018    Hypertriglyceridemia 11/21/2016    Current moderate episode of major depressive disorder ([facility] ) 09/29/2015    Morbid obesity with BMI of 50 0-59 9, adult ([facility] ) 05/04/2015    Obstructive sleep apnea 04/07/2014    Vitamin D deficiency 08/26/2013     She  has a past surgical history that includes Breast incision and drainage (Right, 07/25/2017) and Tonsillectomy (2016)  Her family history includes Allergies in her family;  Anemia in her sister, sister, and sister; Aneurysm in her paternal grandfather; Anxiety disorder in her sister; Appendicitis in her maternal grandmother; Depression in her mother and sister; Diabetes in her family and mother; Hyperlipidemia in her mother; Hypertension in her mother; Obesity in her family; Prostate cancer in her paternal uncle; Thyroid disease in her sister  She  reports that she has never smoked  She has never used smokeless tobacco  She reports current alcohol use  She reports current drug use  Drug: Marijuana  Current Outpatient Medications   Medication Sig Dispense Refill    albuterol (Ventolin HFA) 90 mcg/act inhaler Inhale 2 puffs every 6 (six) hours as needed for wheezing 18 g 0    Blood Glucose Monitoring Suppl (OneTouch Verio Reflect) w/Device KIT Use 1 kit 2 (two) times a day E11 65, Please substitute with appropriate alternative as covered by patient's insurance  1 kit 0    clotrimazole-betamethasone (LOTRISONE) 1-0 05 % cream Apply topically 2 (two) times a day 30 g 0    ferrous sulfate 325 (65 Fe) mg tablet TAKE 1 TABLET BY MOUTH 2 TIMES A DAY WITH MEALS 60 tablet 5    glucose blood (OneTouch Verio) test strip E11 65, Please substitute with appropriate alternative as covered by patient's insurance  100 strip 1    lisinopril (ZESTRIL) 2 5 mg tablet Take 1 tablet (2 5 mg total) by mouth daily 90 tablet 1    metFORMIN (GLUCOPHAGE) 1000 MG tablet Take 1 tablet (1,000 mg total) by mouth 2 (two) times a day with meals 180 tablet 1    OneTouch Delica Lancets 33X MISC Use 2 (two) times a day E11 65, Please substitute with appropriate alternative as covered by patient's insurance   100 each 1    terconazole (TERAZOL 7) 0 4 % vaginal cream Insert 1 applicator into the vagina daily at bedtime 45 g 0    benzonatate (TESSALON) 200 MG capsule Take 1 capsule (200 mg total) by mouth 3 (three) times a day as needed for cough (Patient not taking: Reported on 7/13/2022) 20 capsule 0    dicyclomine (BENTYL) 10 mg capsule Take 1 capsule (10 mg total) by mouth 4 (four) times a day (before meals and at bedtime) (Patient not taking: Reported on 7/13/2022) 30 capsule 0    Dulaglutide (Trulicity) 6 81 SF/5 5XG SOPN Inject 0 5 mL (0 75 mg total) under the skin once a week (Patient not taking: No sig reported) 2 mL 1    etonogestrel-ethinyl estradiol (NUVARING) 0 12-0 015 MG/24HR vaginal ring Insert vaginally and leave in place for 3 consecutive weeks, then remove for 1 week  (Patient not taking: Reported on 7/13/2022) 3 each 3    ibuprofen (MOTRIN) 400 mg tablet Take 1 tablet (400 mg total) by mouth every 6 (six) hours as needed for mild pain for up to 3 days 12 tablet 0    zolpidem (AMBIEN) 5 mg tablet Take 1 tablet (5 mg total) by mouth daily at bedtime as needed for sleep (Patient not taking: Reported on 7/13/2022) 30 tablet 0     No current facility-administered medications for this visit  Current Outpatient Medications on File Prior to Visit   Medication Sig    albuterol (Ventolin HFA) 90 mcg/act inhaler Inhale 2 puffs every 6 (six) hours as needed for wheezing    Blood Glucose Monitoring Suppl (OneTouch Verio Reflect) w/Device KIT Use 1 kit 2 (two) times a day E11 65, Please substitute with appropriate alternative as covered by patient's insurance   ferrous sulfate 325 (65 Fe) mg tablet TAKE 1 TABLET BY MOUTH 2 TIMES A DAY WITH MEALS    glucose blood (OneTouch Verio) test strip E11 65, Please substitute with appropriate alternative as covered by patient's insurance   lisinopril (ZESTRIL) 2 5 mg tablet Take 1 tablet (2 5 mg total) by mouth daily    metFORMIN (GLUCOPHAGE) 1000 MG tablet Take 1 tablet (1,000 mg total) by mouth 2 (two) times a day with meals    OneTouch Delica Lancets 89K MISC Use 2 (two) times a day E11 65, Please substitute with appropriate alternative as covered by patient's insurance      benzonatate (TESSALON) 200 MG capsule Take 1 capsule (200 mg total) by mouth 3 (three) times a day as needed for cough (Patient not taking: Reported on 7/13/2022)    dicyclomine (BENTYL) 10 mg capsule Take 1 capsule (10 mg total) by mouth 4 (four) times a day (before meals and at bedtime) (Patient not taking: Reported on 7/13/2022)    Dulaglutide (Trulicity) 0 42 CM/1 4NB SOPN Inject 0 5 mL (0 75 mg total) under the skin once a week (Patient not taking: No sig reported)    etonogestrel-ethinyl estradiol (NUVARING) 0 12-0 015 MG/24HR vaginal ring Insert vaginally and leave in place for 3 consecutive weeks, then remove for 1 week  (Patient not taking: Reported on 7/13/2022)    ibuprofen (MOTRIN) 400 mg tablet Take 1 tablet (400 mg total) by mouth every 6 (six) hours as needed for mild pain for up to 3 days    zolpidem (AMBIEN) 5 mg tablet Take 1 tablet (5 mg total) by mouth daily at bedtime as needed for sleep (Patient not taking: Reported on 7/13/2022)     No current facility-administered medications on file prior to visit  She has No Known Allergies       Review of Systems   Constitutional: Negative for fever  Gastrointestinal: Negative  Genitourinary:        See HPI         Objective:      /90   Pulse 96   Ht 5' 3" (1 6 m)   Wt (!) 146 kg (322 lb 3 2 oz)   LMP 06/25/2022   BMI 57 08 kg/m²          Physical Exam  Vitals reviewed  Abdominal:      General: There is no distension  Palpations: Abdomen is soft  There is no mass  Tenderness: There is no abdominal tenderness  There is no guarding or rebound  Hernia: No hernia is present  There is no hernia in the left inguinal area or right inguinal area  Genitourinary:     General: Normal vulva  Labia:         Right: Rash present  Left: Rash present  Vagina: Vaginal discharge present  Cervix: Normal       Uterus: Normal        Adnexa:         Right: No mass, tenderness or fullness  Left: No mass, tenderness or fullness  Lymphadenopathy:      Lower Body: No right inguinal adenopathy   No left inguinal adenopathy  Neurological:      Mental Status: She is alert

## 2022-07-14 DIAGNOSIS — B37.9 CANDIDIASIS: Primary | ICD-10-CM

## 2022-07-14 RX ORDER — FLUCONAZOLE 150 MG/1
150 TABLET ORAL ONCE
Qty: 1 TABLET | Refills: 0 | Status: SHIPPED | OUTPATIENT
Start: 2022-07-14 | End: 2022-07-14

## 2022-08-11 LAB
LEFT EYE DIABETIC RETINOPATHY: NORMAL
RIGHT EYE DIABETIC RETINOPATHY: NORMAL

## 2022-08-23 ENCOUNTER — OFFICE VISIT (OUTPATIENT)
Dept: FAMILY MEDICINE CLINIC | Facility: CLINIC | Age: 22
End: 2022-08-23
Payer: MEDICARE

## 2022-08-23 VITALS
OXYGEN SATURATION: 98 % | DIASTOLIC BLOOD PRESSURE: 80 MMHG | HEIGHT: 63 IN | BODY MASS INDEX: 51.91 KG/M2 | WEIGHT: 293 LBS | SYSTOLIC BLOOD PRESSURE: 120 MMHG | RESPIRATION RATE: 18 BRPM | TEMPERATURE: 97.8 F | HEART RATE: 96 BPM

## 2022-08-23 DIAGNOSIS — E11.65 UNCONTROLLED TYPE 2 DIABETES MELLITUS WITH HYPERGLYCEMIA (HCC): Primary | ICD-10-CM

## 2022-08-23 DIAGNOSIS — L98.9 SKIN LESION OF RIGHT LOWER EXTREMITY: ICD-10-CM

## 2022-08-23 DIAGNOSIS — D50.0 IRON DEFICIENCY ANEMIA DUE TO CHRONIC BLOOD LOSS: ICD-10-CM

## 2022-08-23 DIAGNOSIS — E78.1 HYPERTRIGLYCERIDEMIA: ICD-10-CM

## 2022-08-23 DIAGNOSIS — R79.89 ELEVATED LFTS: ICD-10-CM

## 2022-08-23 DIAGNOSIS — E66.01 MORBID OBESITY WITH BMI OF 50.0-59.9, ADULT (HCC): ICD-10-CM

## 2022-08-23 LAB — SL AMB POCT HEMOGLOBIN AIC: 11.2 (ref ?–6.5)

## 2022-08-23 PROCEDURE — 99214 OFFICE O/P EST MOD 30 MIN: CPT | Performed by: NURSE PRACTITIONER

## 2022-08-23 PROCEDURE — 83036 HEMOGLOBIN GLYCOSYLATED A1C: CPT | Performed by: NURSE PRACTITIONER

## 2022-08-23 RX ORDER — GLIMEPIRIDE 1 MG/1
1 TABLET ORAL
Qty: 90 TABLET | Refills: 1 | Status: SHIPPED | OUTPATIENT
Start: 2022-08-23 | End: 2022-10-24

## 2022-08-23 RX ORDER — METFORMIN HYDROCHLORIDE 500 MG/1
1000 TABLET, EXTENDED RELEASE ORAL 2 TIMES DAILY WITH MEALS
Qty: 360 TABLET | Refills: 1 | Status: SHIPPED | OUTPATIENT
Start: 2022-08-23 | End: 2023-02-19

## 2022-08-23 NOTE — PROGRESS NOTES
FAMILY PRACTICE OFFICE VISIT       NAME: Maddison Torres  AGE: 25 y o  SEX: female       : 2000        MRN: [id number]    Assessment and Plan   1  Uncontrolled type 2 diabetes mellitus with hyperglycemia [facility])  Assessment & Plan:    Lab Results   Component Value Date    HGBA1C 11 2 (A) 2022     Self discontinued Trulicity and metformin  Just restarted metformin 3 weeks ago  Currently taking 1000 mg once daily  Change metformin to Metformin XR 1000 mg twice daily with meals  Start with 1000 mg once in the morning and 500 mg in the evening  In two weeks increase to 1000 mg twice daily  Refuses to try any other injectable GLP-1 medications  Only wants to do oral medications  Will add Glimepiride 1 mg daily with breakfast    Strongly recommend diabetic education  She is agreeable  Foot exam completed today  Eye exam is up to date  Will complete urine for microalbumin at next office visit  She self discontinued low dose lisinopril  We discussed long term complications of uncontrolled diabetes  Orders:  -     POCT hemoglobin A1c  -     Ambulatory referral to Diabetic Education; Future; Expected date: 2022  -     metFORMIN (GLUCOPHAGE-XR) 500 mg 24 hr tablet; Take 2 tablets (1,000 mg total) by mouth 2 (two) times a day with meals  -     glimepiride (AMARYL) 1 mg tablet; Take 1 tablet (1 mg total) by mouth daily with breakfast  -     Hemoglobin A1C; Future    2  Skin lesion of right lower extremity  -     Ambulatory Referral to Dermatology; Future    3  Elevated LFTs  Assessment & Plan:  Elevated AST/ALT since 2019  Has been recommended to proceed with ultrasound, GI follow up, although has not proceeded with this  Last blood work completed in 2021, overdue for recheck  4  Hypertriglyceridemia  Assessment & Plan:  Overdue for repeat lipid panel  5  Iron deficiency anemia due to chronic blood loss  Assessment & Plan:  Last H&H 11 3/31 in 2021  Overdue for blood work  Has previous orders  6  Morbid obesity with BMI of 50 0-59 9, adult [facility])  Assessment & Plan:  We spoke at length today regarding importance of weight loss  We spoke about diet, regular exercise, free apps such as [employer], or [employer] pal that help with weight loss by keep food diary and counting calories  Follow up in 3 months or sooner if needed  Complete blood work before next office visit  Orders previously provided  Chief Complaint     Chief Complaint   Patient presents with    Follow-up     DM2, Foot and A1C       History of Present Illness     Elysia Grimes is a 25year old female presenting today for follow up on diabetes  Stopped Trulicty on her own, gave her headaches and GI upset  Just started taking Metformin 3 weeks ago  Currently taking 1000 mg in the morning  It does give her GI upset, working up to BID  Had primary COVID-19 vaccine series  Never had a COVID-19 booster  Planning to do so  Vaccines tdap and prevnar 20    Weight is maintaining  Exercise--walking  March was struggling with depression, had difficult just getting out of bed  This has passed and mood is much better now  Review of Systems   Review of Systems   Constitutional: Negative  HENT: Negative  Eyes: Negative for visual disturbance  Respiratory: Negative  Cardiovascular: Negative  Gastrointestinal: Negative  Genitourinary: Negative  Musculoskeletal: Negative  Skin: Negative  Neurological: Negative  Hematological: Negative  Psychiatric/Behavioral: Negative  I have reviewed the patient's medical history in detail; there are no changes to the history as noted in the electronic medical record      Objective     Vitals:    08/23/22 0807   BP: 120/80   Pulse: 96   Resp: 18   Temp: 97 8 °F (36 6 °C)   TempSrc: Temporal   SpO2: 98%   Weight: (!) 146 kg (322 lb)   Height: 5' 3" (1 6 m)     Wt Readings from Last 3 Encounters:   22 (!) 146 kg (322 lb)   22 (!) 146 kg (322 lb 3 2 oz)   22 (!) 145 kg (320 lb)     PHQ-2/9 Depression Screening    Little interest or pleasure in doing things: 0 - not at all  Feeling down, depressed, or hopeless: 0 - not at all  Trouble falling or staying asleep, or sleeping too much: 0 - not at all  Feeling tired or having little energy: 0 - not at all  Poor appetite or overeatin - not at all  Feeling bad about yourself - or that you are a failure or have let yourself or your family down: 0 - not at all  Trouble concentrating on things, such as reading the newspaper or watching television: 0 - not at all  Moving or speaking so slowly that other people could have noticed  Or the opposite - being so fidgety or restless that you have been moving around a lot more than usual: 0 - not at all  Thoughts that you would be better off dead, or of hurting yourself in some way: 0 - not at all  PHQ-2 Score: 0  PHQ-2 Interpretation: Negative depression screen  PHQ-9 Score: 0   PHQ-9 Interpretation: No or Minimal depression        Physical Exam  Vitals and nursing note reviewed  Constitutional:       General: She is not in acute distress  Appearance: Normal appearance  She is well-developed  She is not ill-appearing  HENT:      Head: Normocephalic and atraumatic  Right Ear: Tympanic membrane and ear canal normal       Left Ear: Tympanic membrane and ear canal normal       Mouth/Throat:      Mouth: Mucous membranes are moist       Pharynx: Oropharynx is clear  Eyes:      Conjunctiva/sclera: Conjunctivae normal       Pupils: Pupils are equal, round, and reactive to light  Neck:      Thyroid: No thyromegaly  Cardiovascular:      Rate and Rhythm: Normal rate and regular rhythm  Pulses: no weak pulses          Dorsalis pedis pulses are 2+ on the right side and 2+ on the left side  Posterior tibial pulses are 2+ on the right side and 2+ on the left side        Heart sounds: No murmur heard  Pulmonary:      Effort: Pulmonary effort is normal       Breath sounds: Normal breath sounds  Abdominal:      General: Bowel sounds are normal       Palpations: Abdomen is soft  Tenderness: There is no abdominal tenderness  Musculoskeletal:      Cervical back: Normal range of motion and neck supple  Right lower leg: No edema  Left lower leg: No edema  Feet:      Right foot:      Skin integrity: No ulcer, skin breakdown, erythema, warmth, callus or dry skin  Left foot:      Skin integrity: No ulcer, skin breakdown, erythema, warmth, callus or dry skin  Lymphadenopathy:      Cervical: No cervical adenopathy  Skin:     Findings: No rash  Neurological:      Mental Status: She is alert and oriented to person, place, and time  Psychiatric:         Mood and Affect: Mood normal           Diabetic Foot Exam    Patient's shoes and socks removed  Right Foot/Ankle   Right Foot Inspection  Skin Exam: skin normal and skin intact  No dry skin, no warmth, no callus, no erythema, no maceration, no abnormal color, no pre-ulcer, no ulcer and no callus  Toe Exam: ROM and strength within normal limits  Sensory   Proprioception: intact  Monofilament testing: intact    Vascular  Capillary refills: < 3 seconds  The right DP pulse is 2+  The right PT pulse is 2+  Left Foot/Ankle  Left Foot Inspection  Skin Exam: skin normal and skin intact  No dry skin, no warmth, no erythema, no maceration, normal color, no pre-ulcer, no ulcer and no callus  Toe Exam: ROM and strength within normal limits  Sensory   Proprioception: intact  Monofilament testing: intact    Vascular  Capillary refills: < 3 seconds  The left DP pulse is 2+  The left PT pulse is 2+       Assign Risk Category  No deformity present  No loss of protective sensation  No weak pulses  Risk: 0    ALLERGIES:  No Known Allergies    Current Medications     Current Outpatient Medications   Medication Sig Dispense Refill    Blood Glucose Monitoring Suppl (OneTouch Verio Reflect) w/Device KIT Use 1 kit 2 (two) times a day E11 65, Please substitute with appropriate alternative as covered by patient's insurance  1 kit 0    ferrous sulfate 325 (65 Fe) mg tablet TAKE 1 TABLET BY MOUTH 2 TIMES A DAY WITH MEALS 60 tablet 5    glimepiride (AMARYL) 1 mg tablet Take 1 tablet (1 mg total) by mouth daily with breakfast 90 tablet 1    glucose blood (OneTouch Verio) test strip E11 65, Please substitute with appropriate alternative as covered by patient's insurance  100 strip 1    metFORMIN (GLUCOPHAGE-XR) 500 mg 24 hr tablet Take 2 tablets (1,000 mg total) by mouth 2 (two) times a day with meals 360 tablet 1    OneTouch Delica Lancets 91B MISC Use 2 (two) times a day E11 65, Please substitute with appropriate alternative as covered by patient's insurance  100 each 1    ibuprofen (MOTRIN) 400 mg tablet Take 1 tablet (400 mg total) by mouth every 6 (six) hours as needed for mild pain for up to 3 days 12 tablet 0    zolpidem (AMBIEN) 5 mg tablet Take 1 tablet (5 mg total) by mouth daily at bedtime as needed for sleep (Patient not taking: No sig reported) 30 tablet 0     No current facility-administered medications for this visit           Health Maintenance     Health Maintenance   Topic Date Due    PT PLAN OF CARE  Never done    HIV Screening  Never done    Hepatitis A Vaccine (2 of 2 - 2-dose series) 01/26/2018    Pneumococcal Vaccine: Pediatrics (0 to 5 Years) and At-Risk Patients (6 to 59 Years) (2 - PCV) 02/04/2021    URINE MICROALBUMIN  06/18/2021    COVID-19 Vaccine (3 - Booster for Pfizer series) 10/21/2021    DTaP,Tdap,and Td Vaccines (7 - Td or Tdap) 11/17/2021    Diabetic Foot Exam  03/30/2022    Chlamydia Screening  04/07/2022    Influenza Vaccine (1) 09/01/2022    Annual Physical  11/12/2022    BMI: Followup Plan  02/24/2023    HEMOGLOBIN A1C  02/23/2023    Depression Screening  08/23/2023    BMI: Adult  08/23/2023    DM Eye Exam  08/11/2024    Hepatitis C Screening  Completed    HIB Vaccine  Completed    Hepatitis B Vaccine  Completed    IPV Vaccine  Completed    Meningococcal ACWY Vaccine  Completed    HPV Vaccine  Completed     Immunization History   Administered Date(s) Administered    COVID-19 PFIZER VACCINE 0 3 ML IM 04/29/2021, 05/21/2021    DTP 2000, 2000, 2000, 05/31/2001, 03/23/2005    H1N1, All Formulations 10/27/2009    HPV Quadrivalent 06/28/2011, 11/17/2011, 11/19/2012    Hep A, ped/adol, 2 dose 07/26/2017    Hep B, Adolescent or Pediatric 2000, 2000, 05/31/2001    Hep B, adult 2000, 2000, 05/31/2001    Hepatitis A 07/26/2017    HiB 2000, 2000, 2000, 10/29/2001, 10/29/2011    Hib (PRP-OMP) 2000, 2000, 2000, 10/29/2011    INFLUENZA 11/17/2011, 11/19/2012, 11/26/2013    IPV 2000, 2000, 05/23/2001, 03/23/2005    Influenza Quadrivalent Preservative Free 3 years and older IM 01/04/2016    Influenza, recombinant, quadrivalent,injectable, preservative free 11/07/2019    Influenza, seasonal, injectable 11/17/2011, 11/19/2012, 11/26/2013, 11/18/2014    MMR 05/31/2001, 03/23/2005    Meningococcal B, OMV (BEXSERO) 04/26/2019    Meningococcal MCV4P 11/17/2011, 07/26/2017    Meningococcal, Unknown Serogroups 11/17/2011, 07/26/2017    Pneumococcal Conjugate PCV 7 2000, 2000, 2000, 05/31/2001    Pneumococcal Polysaccharide PPV23 02/04/2020    Tdap 2000, 11/07/2003, 11/17/2011    Tuberculin Skin Test-PPD Intradermal 11/07/2003, 02/24/2021    Varicella 10/29/2001, 08/21/2008       KELLY Ji

## 2022-08-27 PROBLEM — E11.65 UNCONTROLLED TYPE 2 DIABETES MELLITUS WITH HYPERGLYCEMIA (HCC): Status: ACTIVE | Noted: 2020-02-04

## 2022-08-27 PROBLEM — F41.9 ANXIETY: Status: RESOLVED | Noted: 2020-02-18 | Resolved: 2022-08-27

## 2022-08-27 PROBLEM — E78.5 HYPERLIPIDEMIA LDL GOAL <70: Status: RESOLVED | Noted: 2020-02-04 | Resolved: 2022-08-27

## 2022-08-27 NOTE — ASSESSMENT & PLAN NOTE
We spoke at length today regarding importance of weight loss  We spoke about diet, regular exercise, free apps such as [employer], or [employer] pal that help with weight loss by keep food diary and counting calories

## 2022-08-27 NOTE — ASSESSMENT & PLAN NOTE
Elevated AST/ALT since 2019  Has been recommended to proceed with ultrasound, GI follow up, although has not proceeded with this  Last blood work completed in March 2021, overdue for recheck

## 2022-08-27 NOTE — ASSESSMENT & PLAN NOTE
Lab Results   Component Value Date    HGBA1C 11 2 (A) 08/23/2022     Self discontinued Trulicity and metformin  Just restarted metformin 3 weeks ago  Currently taking 1000 mg once daily  Change metformin to Metformin XR 1000 mg twice daily with meals  Start with 1000 mg once in the morning and 500 mg in the evening  In two weeks increase to 1000 mg twice daily  Refuses to try any other injectable GLP-1 medications  Only wants to do oral medications  Will add Glimepiride 1 mg daily with breakfast    Strongly recommend diabetic education  She is agreeable  Foot exam completed today  Eye exam is up to date  Will complete urine for microalbumin at next office visit  She self discontinued low dose lisinopril  We discussed long term complications of uncontrolled diabetes

## 2022-10-11 LAB
ALBUMIN SERPL BCP-MCNC: 4.1 G/DL (ref 3.5–5)
ALP SERPL-CCNC: 83 U/L (ref 34–104)
ALT SERPL W P-5'-P-CCNC: 110 U/L (ref 7–52)
ANION GAP SERPL CALCULATED.3IONS-SCNC: 10 MMOL/L (ref 4–13)
AST SERPL W P-5'-P-CCNC: 149 U/L (ref 13–39)
BACTERIA UR QL AUTO: ABNORMAL /HPF
BASOPHILS # BLD AUTO: 0.01 THOUSANDS/ΜL (ref 0–0.1)
BASOPHILS NFR BLD AUTO: 0 % (ref 0–1)
BILIRUB SERPL-MCNC: 0.65 MG/DL (ref 0.2–1)
BILIRUB UR QL STRIP: NEGATIVE
BUN SERPL-MCNC: 7 MG/DL (ref 5–25)
CALCIUM SERPL-MCNC: 9.2 MG/DL (ref 8.4–10.2)
CHLORIDE SERPL-SCNC: 101 MMOL/L (ref 96–108)
CLARITY UR: CLEAR
CO2 SERPL-SCNC: 22 MMOL/L (ref 21–32)
COLOR UR: YELLOW
CREAT SERPL-MCNC: 0.47 MG/DL (ref 0.6–1.3)
EOSINOPHIL # BLD AUTO: 0.08 THOUSAND/ΜL (ref 0–0.61)
EOSINOPHIL NFR BLD AUTO: 1 % (ref 0–6)
ERYTHROCYTE [DISTWIDTH] IN BLOOD BY AUTOMATED COUNT: 17.3 % (ref 11.6–15.1)
EXT PREG TEST URINE: POSITIVE
EXT. CONTROL ED NAV: ABNORMAL
GFR SERPL CREATININE-BSD FRML MDRD: 140 ML/MIN/1.73SQ M
GLUCOSE SERPL-MCNC: 242 MG/DL (ref 65–140)
GLUCOSE UR STRIP-MCNC: ABNORMAL MG/DL
HCT VFR BLD AUTO: 34.2 % (ref 34.8–46.1)
HGB BLD-MCNC: 10.3 G/DL (ref 11.5–15.4)
HGB UR QL STRIP.AUTO: NEGATIVE
IMM GRANULOCYTES # BLD AUTO: 0.08 THOUSAND/UL (ref 0–0.2)
IMM GRANULOCYTES NFR BLD AUTO: 1 % (ref 0–2)
KETONES UR STRIP-MCNC: ABNORMAL MG/DL
LEUKOCYTE ESTERASE UR QL STRIP: NEGATIVE
LIPASE SERPL-CCNC: 9 U/L (ref 11–82)
LYMPHOCYTES # BLD AUTO: 1.48 THOUSANDS/ΜL (ref 0.6–4.47)
LYMPHOCYTES NFR BLD AUTO: 14 % (ref 14–44)
MCH RBC QN AUTO: 21.3 PG (ref 26.8–34.3)
MCHC RBC AUTO-ENTMCNC: 30.1 G/DL (ref 31.4–37.4)
MCV RBC AUTO: 71 FL (ref 82–98)
MONOCYTES # BLD AUTO: 0.23 THOUSAND/ΜL (ref 0.17–1.22)
MONOCYTES NFR BLD AUTO: 2 % (ref 4–12)
MUCOUS THREADS UR QL AUTO: ABNORMAL
NEUTROPHILS # BLD AUTO: 8.79 THOUSANDS/ΜL (ref 1.85–7.62)
NEUTS SEG NFR BLD AUTO: 82 % (ref 43–75)
NITRITE UR QL STRIP: NEGATIVE
NON-SQ EPI CELLS URNS QL MICRO: ABNORMAL /HPF
NRBC BLD AUTO-RTO: 0 /100 WBCS
PH UR STRIP.AUTO: 5.5 [PH]
PLATELET # BLD AUTO: 253 THOUSANDS/UL (ref 149–390)
PMV BLD AUTO: 11.8 FL (ref 8.9–12.7)
POTASSIUM SERPL-SCNC: 3.6 MMOL/L (ref 3.5–5.3)
PROT SERPL-MCNC: 7.7 G/DL (ref 6.4–8.4)
PROT UR STRIP-MCNC: ABNORMAL MG/DL
RBC # BLD AUTO: 4.84 MILLION/UL (ref 3.81–5.12)
RBC #/AREA URNS AUTO: ABNORMAL /HPF
SODIUM SERPL-SCNC: 133 MMOL/L (ref 135–147)
SP GR UR STRIP.AUTO: 1.02 (ref 1–1.03)
UROBILINOGEN UR STRIP-ACNC: <2 MG/DL
WBC # BLD AUTO: 10.67 THOUSAND/UL (ref 4.31–10.16)
WBC #/AREA URNS AUTO: ABNORMAL /HPF

## 2022-10-11 PROCEDURE — 85025 COMPLETE CBC W/AUTO DIFF WBC: CPT | Performed by: EMERGENCY MEDICINE

## 2022-10-11 PROCEDURE — 84702 CHORIONIC GONADOTROPIN TEST: CPT

## 2022-10-11 PROCEDURE — 81001 URINALYSIS AUTO W/SCOPE: CPT | Performed by: EMERGENCY MEDICINE

## 2022-10-11 PROCEDURE — 80053 COMPREHEN METABOLIC PANEL: CPT | Performed by: EMERGENCY MEDICINE

## 2022-10-11 PROCEDURE — 99284 EMERGENCY DEPT VISIT MOD MDM: CPT

## 2022-10-11 PROCEDURE — 81025 URINE PREGNANCY TEST: CPT | Performed by: EMERGENCY MEDICINE

## 2022-10-11 PROCEDURE — 83690 ASSAY OF LIPASE: CPT | Performed by: EMERGENCY MEDICINE

## 2022-10-11 PROCEDURE — 36415 COLL VENOUS BLD VENIPUNCTURE: CPT

## 2022-10-12 ENCOUNTER — APPOINTMENT (OUTPATIENT)
Dept: ULTRASOUND IMAGING | Facility: HOSPITAL | Age: 22
End: 2022-10-12
Payer: MEDICARE

## 2022-10-12 ENCOUNTER — HOSPITAL ENCOUNTER (EMERGENCY)
Facility: HOSPITAL | Age: 22
Discharge: HOME | End: 2022-10-12
Attending: EMERGENCY MEDICINE
Payer: MEDICARE

## 2022-10-12 VITALS
TEMPERATURE: 98.4 F | OXYGEN SATURATION: 99 % | RESPIRATION RATE: 7 BRPM | SYSTOLIC BLOOD PRESSURE: 143 MMHG | HEART RATE: 97 BPM | DIASTOLIC BLOOD PRESSURE: 60 MMHG

## 2022-10-12 DIAGNOSIS — R16.0 HEPATOMEGALY: ICD-10-CM

## 2022-10-12 DIAGNOSIS — K76.0 HEPATIC STEATOSIS: ICD-10-CM

## 2022-10-12 DIAGNOSIS — R10.9 ABDOMINAL PAIN: Primary | ICD-10-CM

## 2022-10-12 DIAGNOSIS — Z3A.01 LESS THAN 8 WEEKS GESTATION OF PREGNANCY: ICD-10-CM

## 2022-10-12 DIAGNOSIS — R11.2 NAUSEA & VOMITING: ICD-10-CM

## 2022-10-12 LAB
B-HCG SERPL-ACNC: 4226 MIU/ML (ref 0–11.6)
FLUAV RNA RESP QL NAA+PROBE: NEGATIVE
FLUBV RNA RESP QL NAA+PROBE: NEGATIVE
RSV RNA RESP QL NAA+PROBE: NEGATIVE
SARS-COV-2 RNA RESP QL NAA+PROBE: NEGATIVE

## 2022-10-12 PROCEDURE — 99284 EMERGENCY DEPT VISIT MOD MDM: CPT | Performed by: EMERGENCY MEDICINE

## 2022-10-12 PROCEDURE — 76815 OB US LIMITED FETUS(S): CPT

## 2022-10-12 PROCEDURE — 96375 TX/PRO/DX INJ NEW DRUG ADDON: CPT

## 2022-10-12 PROCEDURE — 0241U HB NFCT DS VIR RESP RNA 4 TRGT: CPT

## 2022-10-12 PROCEDURE — 96374 THER/PROPH/DIAG INJ IV PUSH: CPT

## 2022-10-12 PROCEDURE — 76705 ECHO EXAM OF ABDOMEN: CPT

## 2022-10-12 PROCEDURE — 96361 HYDRATE IV INFUSION ADD-ON: CPT

## 2022-10-12 RX ORDER — ACETAMINOPHEN 325 MG/1
650 TABLET ORAL ONCE
Status: COMPLETED | OUTPATIENT
Start: 2022-10-12 | End: 2022-10-12

## 2022-10-12 RX ORDER — ONDANSETRON 4 MG/1
4 TABLET, FILM COATED ORAL EVERY 8 HOURS PRN
Qty: 15 TABLET | Refills: 0 | Status: SHIPPED | OUTPATIENT
Start: 2022-10-12 | End: 2022-10-19

## 2022-10-12 RX ORDER — FAMOTIDINE 20 MG/1
20 TABLET, FILM COATED ORAL DAILY PRN
Qty: 90 TABLET | Refills: 0 | Status: SHIPPED | OUTPATIENT
Start: 2022-10-12 | End: 2023-01-10

## 2022-10-12 RX ORDER — ONDANSETRON 2 MG/ML
4 INJECTION INTRAMUSCULAR; INTRAVENOUS ONCE
Status: COMPLETED | OUTPATIENT
Start: 2022-10-12 | End: 2022-10-12

## 2022-10-12 RX ORDER — MAGNESIUM HYDROXIDE/ALUMINUM HYDROXICE/SIMETHICONE 120; 1200; 1200 MG/30ML; MG/30ML; MG/30ML
30 SUSPENSION ORAL ONCE
Status: COMPLETED | OUTPATIENT
Start: 2022-10-12 | End: 2022-10-12

## 2022-10-12 RX ORDER — FAMOTIDINE 10 MG/ML
20 INJECTION, SOLUTION INTRAVENOUS ONCE
Status: COMPLETED | OUTPATIENT
Start: 2022-10-12 | End: 2022-10-12

## 2022-10-12 RX ADMIN — ALUMINUM HYDROXIDE, MAGNESIUM HYDROXIDE, AND SIMETHICONE 30 ML: 200; 200; 20 SUSPENSION ORAL at 00:42

## 2022-10-12 RX ADMIN — ACETAMINOPHEN 650 MG: 325 TABLET ORAL at 00:42

## 2022-10-12 RX ADMIN — ONDANSETRON 4 MG: 2 INJECTION INTRAMUSCULAR; INTRAVENOUS at 00:43

## 2022-10-12 RX ADMIN — SODIUM CHLORIDE 1000 ML: 0.9 INJECTION, SOLUTION INTRAVENOUS at 00:42

## 2022-10-12 RX ADMIN — FAMOTIDINE 20 MG: 10 INJECTION, SOLUTION INTRAVENOUS at 00:43

## 2022-10-12 NOTE — DISCHARGE INSTRUCTIONS
HAVE TO FOLLOW UP WITH OBGYN WITHIN 48 HOURS FOR REPEAT QUANTITATIVE HCG AND REPEAT VAGINAL ULTRASOUND  Return to ED if any vaginal bleeding, vaginal discharge, worsening symptoms  Take meds as prescribed  Follow up with obgyn

## 2022-10-12 NOTE — ED ATTENDING ATTESTATION
10/11/2022  ISheron MD, saw and evaluated the patient  I have discussed the patient with the resident/non-physician practitioner and agree with the resident's/non-physician practitioner's findings, Plan of Care, and MDM as documented in the resident's/non-physician practitioner's note, except where noted  All available labs and Radiology studies were reviewed  I was present for key portions of any procedure(s) performed by the resident/non-physician practitioner and I was immediately available to provide assistance  At this point I agree with the current assessment done in the Emergency Department  I have conducted an independent evaluation of this patient a history and physical is as follows:  Epigastric cramping, lower abdominal cramping, nausea, took pregnancy test yesterday that was positive  Patient denies any additional significant past medical history  Patient to follow-up in 48 hours for repeat hCG level  Improved with ER management  Stable for discharge home  Review of Systems - Negative except epigastric pain, pelvic cramping  Physical Exam  Vitals and nursing note reviewed  Constitutional:       General: She is not in acute distress  Appearance: She is well-developed  HENT:      Head: Normocephalic and atraumatic  Eyes:      Pupils: Pupils are equal, round, and reactive to light  Cardiovascular:      Rate and Rhythm: Normal rate and regular rhythm  Heart sounds: Normal heart sounds  No murmur heard  Pulmonary:      Effort: Pulmonary effort is normal  No respiratory distress  Breath sounds: Normal breath sounds  No wheezing or rales  Abdominal:      General: Bowel sounds are normal  There is no distension  Palpations: Abdomen is soft  Tenderness: There is no abdominal tenderness  There is no guarding or rebound  Musculoskeletal:         General: No deformity  Normal range of motion        Cervical back: Normal range of motion and neck supple  Lymphadenopathy:      Cervical: No cervical adenopathy  Skin:     Capillary Refill: Capillary refill takes less than 2 seconds  Findings: No erythema or rash  Neurological:      Mental Status: She is alert and oriented to person, place, and time  Cranial Nerves: No cranial nerve deficit  Motor: No abnormal muscle tone  Coordination: Coordination normal    Psychiatric:         Behavior: Behavior normal        Results Reviewed     Procedure Component Value Units Date/Time    FLU/RSV/COVID - if FLU/RSV clinically relevant [[id number]]  (Normal) Collected: 10/12/22 0051    Lab Status: Final result Specimen: Nares from Nose Updated: 10/12/22 0136     SARS-CoV-2 Negative     INFLUENZA A PCR Negative     INFLUENZA B PCR Negative     RSV PCR Negative    Narrative:      FOR PEDIATRIC PATIENTS - copy/paste COVID Guidelines URL to browser: [email or web address]  [employer]    SARS-CoV-2 assay is a Nucleic Acid Amplification assay intended for the  qualitative detection of nucleic acid from SARS-CoV-2 in nasopharyngeal  swabs  Results are for the presumptive identification of SARS-CoV-2 RNA  Positive results are indicative of infection with SARS-CoV-2, the virus  causing COVID-19, but do not rule out bacterial infection or co-infection  with other viruses  Laboratories within the [place] and its  territories are required to report all positive results to the appropriate  public health authorities  Negative results do not preclude SARS-CoV-2  infection and should not be used as the sole basis for treatment or other  patient management decisions  Negative results must be combined with  clinical observations, patient history, and epidemiological information  This test has not been FDA cleared or approved  This test has been authorized by FDA under an Emergency Use Authorization  (EUA)   This test is only authorized for the duration of time the  declaration that circumstances exist justifying the authorization of the  emergency use of an in vitro diagnostic tests for detection of SARS-CoV-2  virus and/or diagnosis of COVID-19 infection under section 564(b)(1) of  the Act, 21 U  S C  146GFG-9(Q)(5), unless the authorization is terminated  or revoked sooner  The test has been validated but independent review by FDA  and CLIA is pending  Test performed using [employer] GeneXpert: This RT-PCR assay targets N2,  a region unique to SARS-CoV-2  A conserved region in the E-gene was chosen  for pan-Sarbecovirus detection which includes SARS-CoV-2  According to CMS-2020-01-R, this platform meets the definition of high-throughput technology  hCG, quantitative [[id number]]  (Abnormal) Collected: 10/11/22 2241    Lab Status: Final result Specimen: Blood from Arm, Left Updated: 10/12/22 0127     HCG, Quant 4,226 mIU/mL     Narrative:       Expected Ranges:     Approximate               Approximate HCG  Gestation age          Concentration ( mIU/mL)  _____________          ______________________   [facility]                      HCG values  0 2-1                       5-50  1-2                           2-3                         100-5000  3-4                         500-24081  4-5                         1000-25315  5-6                         [id number]  6-8                         [id number]  8-12                        [id number]      Trauma tubes on hold [[id number]] Collected: 10/11/22 2241    Lab Status: Final result Specimen: Blood from Arm, Left Updated: 10/11/22 8027    Narrative: The following orders were created for panel order Trauma tubes on hold    Procedure                               Abnormality         Status                     ---------                               -----------         ------                     Tulio crump on NGAUXD[[id number]]                                 Final result               Green / Black tube on VYQM[[id number]]                       Final result                 Please view results for these tests on the individual orders      Urine Microscopic [[id number]]  (Abnormal) Collected: 10/11/22 2254    Lab Status: Final result Specimen: Urine, Clean Catch Updated: 10/11/22 2314     RBC, UA None Seen /hpf      WBC, UA None Seen /hpf      Epithelial Cells Occasional /hpf      Bacteria, UA Occasional /hpf      MUCUS THREADS Moderate    Comprehensive metabolic panel [[id number]]  (Abnormal) Collected: 10/11/22 2241    Lab Status: Final result Specimen: Blood from Arm, Left Updated: 10/11/22 2310     Sodium 133 mmol/L      Potassium 3 6 mmol/L      Chloride 101 mmol/L      CO2 22 mmol/L      ANION GAP 10 mmol/L      BUN 7 mg/dL      Creatinine 0 47 mg/dL      Glucose 242 mg/dL      Calcium 9 2 mg/dL       U/L       U/L      Alkaline Phosphatase 83 U/L      Total Protein 7 7 g/dL      Albumin 4 1 g/dL      Total Bilirubin 0 65 mg/dL      eGFR 140 ml/min/1 73sq m     Narrative:      [place] guidelines for Chronic Kidney Disease (CKD):   •  Stage 1 with normal or high GFR (GFR > 90 mL/min/1 73 square meters)  •  Stage 2 Mild CKD (GFR = 60-89 mL/min/1 73 square meters)  •  Stage 3A Moderate CKD (GFR = 45-59 mL/min/1 73 square meters)  •  Stage 3B Moderate CKD (GFR = 30-44 mL/min/1 73 square meters)  •  Stage 4 Severe CKD (GFR = 15-29 mL/min/1 73 square meters)  •  Stage 5 End Stage CKD (GFR <15 mL/min/1 73 square meters)  Note: GFR calculation is accurate only with a steady state creatinine    Lipase [[id number]]  (Abnormal) Collected: 10/11/22 2241    Lab Status: Final result Specimen: Blood from Arm, Left Updated: 10/11/22 2310     Lipase 9 u/L     UA (URINE) with reflex to Scope [[id number]]  (Abnormal) Collected: 10/11/22 2254    Lab Status: Final result Specimen: Urine, Clean Catch Updated: 10/11/22 2307     Color, UA Yellow     Clarity, UA Clear     Specific [place], UA 1 025 pH, UA 5 5     Leukocytes, UA Negative     Nitrite, UA Negative     Protein, UA 30 (1+) mg/dl      Glucose,  (3/20%) mg/dl      Ketones, UA Trace mg/dl      Urobilinogen, UA <2 0 mg/dl      Bilirubin, UA Negative     Occult Blood, UA Negative    CBC and differential [[id number]]  (Abnormal) Collected: 10/11/22 2241    Lab Status: Final result Specimen: Blood from Arm, Left Updated: 10/11/22 2257     WBC 10 67 Thousand/uL      RBC 4 84 Million/uL      Hemoglobin 10 3 g/dL      Hematocrit 34 2 %      MCV 71 fL      MCH 21 3 pg      MCHC 30 1 g/dL      RDW 17 3 %      MPV 11 8 fL      Platelets 915 Thousands/uL      nRBC 0 /100 WBCs      Neutrophils Relative 82 %      Immat GRANS % 1 %      Lymphocytes Relative 14 %      Monocytes Relative 2 %      Eosinophils Relative 1 %      Basophils Relative 0 %      Neutrophils Absolute 8 79 Thousands/µL      Immature Grans Absolute 0 08 Thousand/uL      Lymphocytes Absolute 1 48 Thousands/µL      Monocytes Absolute 0 23 Thousand/µL      Eosinophils Absolute 0 08 Thousand/µL      Basophils Absolute 0 01 Thousands/µL     POCT pregnancy, urine [[id number]]  (Abnormal) Resulted: 10/11/22 2255    Lab Status: Final result Specimen: Urine Updated: 10/11/22 2255     EXT PREG TEST UR (Ref: Negative) positive     Control valid        US OB pregnancy limited with transvaginal   Final Result by Asher Montiel DO (10/12 0218)      Small anechoic structure within the endometrial cavity likely representing early gestational sac  Mean sac diameter 5 mm, too small for accurate dating  Short-term follow-up ultrasound with beta hCG trending recommended  Workstation performed: [id number]         US right upper quadrant   Final Result by Asher Montiel DO (10/12 0214)      Hepatomegaly and hepatic steatosis              Workstation performed: [id number]               ED Course         Critical Care Time  Procedures

## 2022-10-12 NOTE — ED PROVIDER NOTES
History  Chief Complaint   Patient presents with   • Abdominal Cramping     Pt reports upper mid abdominal cramping for 4 days, low appetite, unable to keep anything down, reports feeling lightheaded  Reports home preg test was positive  (+)N/V     23yo female  at approximately 6 wks, 2 days gestation based on LMP on 22 presenting to the ED due to upper abdominal pain/cramping, diarrhea, vomiting x 4 days  States her ab pain feels like GERD as she has been regurgitating food  Bilious, non-bloody vomiting, innumerable times  Vomits whenever she eats or drinks anything  Nonbloody diarrhea  Also notes congestion, cough since 10/7  Tried Dayquil because she thought she was sick with URI; didn't help her sx's  Has not obtained prenatal care  No fevers, chills, CP, SOB, urinary sx's  Denies vaginal bleeding or discharge  Prior to Admission Medications   Prescriptions Last Dose Informant Patient Reported? Taking? Blood Glucose Monitoring Suppl (OneTouch Verio Reflect) w/Device KIT   No No   Sig: Use 1 kit 2 (two) times a day E11 65, Please substitute with appropriate alternative as covered by patient's insurance  OneTouch Delica Lancets 33P MISC   No No   Sig: Use 2 (two) times a day E11 65, Please substitute with appropriate alternative as covered by patient's insurance  ferrous sulfate 325 (65 Fe) mg tablet   No No   Sig: TAKE 1 TABLET BY MOUTH 2 TIMES A DAY WITH MEALS   glimepiride (AMARYL) 1 mg tablet   No No   Sig: Take 1 tablet (1 mg total) by mouth daily with breakfast   glucose blood (OneTouch Verio) test strip   No No   Sig: E11 65, Please substitute with appropriate alternative as covered by patient's insurance     ibuprofen (MOTRIN) 400 mg tablet   No No   Sig: Take 1 tablet (400 mg total) by mouth every 6 (six) hours as needed for mild pain for up to 3 days   metFORMIN (GLUCOPHAGE-XR) 500 mg 24 hr tablet   No No   Sig: Take 2 tablets (1,000 mg total) by mouth 2 (two) times a day with meals   zolpidem (AMBIEN) 5 mg tablet   No No   Sig: Take 1 tablet (5 mg total) by mouth daily at bedtime as needed for sleep   Patient not taking: No sig reported      Facility-Administered Medications: None       Past Medical History:   Diagnosis Date   • Anxiety    • Asthma     childhood   • Chronic headache    • Current moderate episode of major depressive disorder ([facility] ) 9/29/2015   • Dextroscoliosis 4/5/2016   • Diabetes mellitus ([facility] )    • Elevated LFTs    • Migraine without aura and without status migrainosus, not intractable 8/16/2019   • Plantar fasciitis 6/18/2020   • Pre-diabetes    • Self-injurious behavior     when 15 y/o       Past Surgical History:   Procedure Laterality Date   • BREAST CYST INCISION AND DRAINAGE Right 07/25/2017    Procedure: INCISION AND DRAINAGE (I&D) BREAST;  Surgeon: Daphne Tay DO;  Location: [facility];  Service: General   • TONSILLECTOMY  2016       Family History   Problem Relation Age of Onset   • Hypertension Mother    • Hyperlipidemia Mother    • Diabetes Mother    • Depression Mother    • Thyroid disease Sister    • Anemia Sister    • Depression Sister    • Anxiety disorder Sister    • Obesity Family    • Allergies Family    • Diabetes Family    • Appendicitis Maternal Grandmother    • Aneurysm Paternal Grandfather    • Anemia Sister    • Anemia Sister    • Prostate cancer Paternal Uncle    • Substance Abuse Neg Hx    • Mental illness Neg Hx    • Stroke Neg Hx      I have reviewed and agree with the history as documented      E-Cigarette/Vaping   • E-Cigarette Use Never User      E-Cigarette/Vaping Substances   • Nicotine No    • THC No    • CBD No    • Flavoring No    • Other No    • Unknown No      Social History     Tobacco Use   • Smoking status: Never Smoker   • Smokeless tobacco: Never Used   Vaping Use   • Vaping Use: Never used   Substance Use Topics   • Alcohol use: Yes     Comment: OCCASIONAL/SOCIAL   • Drug use: Yes     Types: Marijuana        Review of Systems   Constitutional: Positive for appetite change  Negative for chills and fever  HENT: Positive for congestion  Negative for ear pain and sore throat  Eyes: Negative for pain and visual disturbance  Respiratory: Positive for cough  Negative for shortness of breath  Cardiovascular: Negative for chest pain and palpitations  Gastrointestinal: Positive for abdominal pain, diarrhea, nausea and vomiting  Genitourinary: Negative for dysuria and hematuria  Musculoskeletal: Negative for arthralgias and back pain  Skin: Negative for color change and rash  Neurological: Negative for seizures and syncope  All other systems reviewed and are negative  Physical Exam  ED Triage Vitals   Temperature Pulse Respirations Blood Pressure SpO2   10/11/22 2230 10/11/22 2230 10/11/22 2231 10/11/22 2230 10/11/22 2230   98 4 °F (36 9 °C) (!) 113 20 (!) 186/79 98 %      Temp Source Heart Rate Source Patient Position - Orthostatic VS BP Location FiO2 (%)   10/11/22 2230 10/11/22 2230 10/11/22 2230 10/11/22 2230 --   Oral Monitor Sitting Left arm       Pain Score       --                    Orthostatic Vital Signs  Vitals:    10/11/22 2232 10/12/22 0045 10/12/22 0100 10/12/22 0306   BP: (!) 177/80 (!) 176/81 (!) 173/69 143/60   Pulse: (!) 111 (!) 107 105 97   Patient Position - Orthostatic VS:   Sitting Sitting       Physical Exam  Vitals and nursing note reviewed  Constitutional:       General: She is not in acute distress  Appearance: She is well-developed  She is obese  HENT:      Head: Normocephalic and atraumatic  Eyes:      Conjunctiva/sclera: Conjunctivae normal    Cardiovascular:      Rate and Rhythm: Normal rate and regular rhythm  Pulses:           Radial pulses are 2+ on the right side and 2+ on the left side  Heart sounds: Normal heart sounds  No murmur heard  Pulmonary:      Effort: Pulmonary effort is normal  No respiratory distress  Breath sounds: Normal breath sounds  Abdominal:      Palpations: Abdomen is soft  Tenderness: There is abdominal tenderness in the right upper quadrant, epigastric area, suprapubic area and left upper quadrant  Negative signs include Rovsing's sign, McBurney's sign, psoas sign and obturator sign  Musculoskeletal:      Cervical back: Neck supple  Right lower leg: No edema  Left lower leg: No edema  Skin:     General: Skin is warm and dry  Neurological:      Mental Status: She is alert  ED Medications  Medications   Famotidine (PF) (PEPCID) injection 20 mg (20 mg Intravenous Given 10/12/22 0043)   aluminum-magnesium hydroxide-simethicone (MYLANTA) oral suspension 30 mL (30 mL Oral Given 10/12/22 0042)   acetaminophen (TYLENOL) tablet 650 mg (650 mg Oral Given 10/12/22 0042)   sodium chloride 0 9 % bolus 1,000 mL (0 mL Intravenous Stopped 10/12/22 0314)   ondansetron (ZOFRAN) injection 4 mg (4 mg Intravenous Given 10/12/22 0043)       Diagnostic Studies  Results Reviewed     Procedure Component Value Units Date/Time    FLU/RSV/COVID - if FLU/RSV clinically relevant [[id number]]  (Normal) Collected: 10/12/22 0051    Lab Status: Final result Specimen: Nares from Nose Updated: 10/12/22 0136     SARS-CoV-2 Negative     INFLUENZA A PCR Negative     INFLUENZA B PCR Negative     RSV PCR Negative    Narrative:      FOR PEDIATRIC PATIENTS - copy/paste COVID Guidelines URL to browser: [email or web address]    SARS-CoV-2 assay is a Nucleic Acid Amplification assay intended for the  qualitative detection of nucleic acid from SARS-CoV-2 in nasopharyngeal  swabs  Results are for the presumptive identification of SARS-CoV-2 RNA  Positive results are indicative of infection with SARS-CoV-2, the virus  causing COVID-19, but do not rule out bacterial infection or co-infection  with other viruses   Laboratories within the [place] and its  territories are required to report all positive results to the appropriate  public health authorities  Negative results do not preclude SARS-CoV-2  infection and should not be used as the sole basis for treatment or other  patient management decisions  Negative results must be combined with  clinical observations, patient history, and epidemiological information  This test has not been FDA cleared or approved  This test has been authorized by FDA under an Emergency Use Authorization  (EUA)  This test is only authorized for the duration of time the  declaration that circumstances exist justifying the authorization of the  emergency use of an in vitro diagnostic tests for detection of SARS-CoV-2  virus and/or diagnosis of COVID-19 infection under section 564(b)(1) of  the Act, 21 U  S C  216ZUP-3(Z)(2), unless the authorization is terminated  or revoked sooner  The test has been validated but independent review by FDA  and CLIA is pending  Test performed using [employer] GeneXpert: This RT-PCR assay targets N2,  a region unique to SARS-CoV-2  A conserved region in the E-gene was chosen  for pan-Sarbecovirus detection which includes SARS-CoV-2  According to CMS-2020-01-R, this platform meets the definition of high-throughput technology      hCG, quantitative [[id number]]  (Abnormal) Collected: 10/11/22 2241    Lab Status: Final result Specimen: Blood from Arm, Left Updated: 10/12/22 0127     HCG, Quant 4,226 mIU/mL     Narrative:       Expected Ranges:     Approximate               Approximate HCG  Gestation age          Concentration ( mIU/mL)  _____________          ______________________   Rosalind Jeff                      HCG values  0 2-1                       5-50  1-2                           2-3                         100-5000  3-4                         500-39193  4-5                         1000-77067  5-6                         [id number]  6-8                         [id number]  8-12                        [id number]      Trauma tubes on hold [[id number]] Collected: 10/11/22 2241    Lab Status: Final result Specimen: Blood from Arm, Left Updated: 10/11/22 2336    Narrative: The following orders were created for panel order Trauma tubes on hold  Procedure                               Abnormality         Status                     ---------                               -----------         ------                     [place] top on TEWG[[id number]]                                 Final result               Green / Black tube on GLGZ[[id number]]                       Final result                 Please view results for these tests on the individual orders      Urine Microscopic [[id number]]  (Abnormal) Collected: 10/11/22 2254    Lab Status: Final result Specimen: Urine, Clean Catch Updated: 10/11/22 2314     RBC, UA None Seen /hpf      WBC, UA None Seen /hpf      Epithelial Cells Occasional /hpf      Bacteria, UA Occasional /hpf      MUCUS THREADS Moderate    Comprehensive metabolic panel [[id number]]  (Abnormal) Collected: 10/11/22 2241    Lab Status: Final result Specimen: Blood from Arm, Left Updated: 10/11/22 2310     Sodium 133 mmol/L      Potassium 3 6 mmol/L      Chloride 101 mmol/L      CO2 22 mmol/L      ANION GAP 10 mmol/L      BUN 7 mg/dL      Creatinine 0 47 mg/dL      Glucose 242 mg/dL      Calcium 9 2 mg/dL       U/L       U/L      Alkaline Phosphatase 83 U/L      Total Protein 7 7 g/dL      Albumin 4 1 g/dL      Total Bilirubin 0 65 mg/dL      eGFR 140 ml/min/1 73sq m     Narrative:      [facility] guidelines for Chronic Kidney Disease (CKD):   •  Stage 1 with normal or high GFR (GFR > 90 mL/min/1 73 square meters)  •  Stage 2 Mild CKD (GFR = 60-89 mL/min/1 73 square meters)  •  Stage 3A Moderate CKD (GFR = 45-59 mL/min/1 73 square meters)  •  Stage 3B Moderate CKD (GFR = 30-44 mL/min/1 73 square meters)  •  Stage 4 Severe CKD (GFR = 15-29 mL/min/1 73 square meters)  •  Stage 5 End Stage CKD (GFR <15 mL/min/1 73 square meters)  Note: GFR calculation is accurate only with a steady state creatinine    Lipase [[id number]]  (Abnormal) Collected: 10/11/22 2241    Lab Status: Final result Specimen: Blood from Arm, Left Updated: 10/11/22 2310     Lipase 9 u/L     UA (URINE) with reflex to Scope [[id number]]  (Abnormal) Collected: 10/11/22 2254    Lab Status: Final result Specimen: Urine, Clean Catch Updated: 10/11/22 2307     Color, UA Yellow     Clarity, UA Clear     Specific Gravity, UA 1 025     pH, UA 5 5     Leukocytes, UA Negative     Nitrite, UA Negative     Protein, UA 30 (1+) mg/dl      Glucose,  (3/20%) mg/dl      Ketones, UA Trace mg/dl      Urobilinogen, UA <2 0 mg/dl      Bilirubin, UA Negative     Occult Blood, UA Negative    CBC and differential [[id number]]  (Abnormal) Collected: 10/11/22 2241    Lab Status: Final result Specimen: Blood from Arm, Left Updated: 10/11/22 2257     WBC 10 67 Thousand/uL      RBC 4 84 Million/uL      Hemoglobin 10 3 g/dL      Hematocrit 34 2 %      MCV 71 fL      MCH 21 3 pg      MCHC 30 1 g/dL      RDW 17 3 %      MPV 11 8 fL      Platelets 976 Thousands/uL      nRBC 0 /100 WBCs      Neutrophils Relative 82 %      Immat GRANS % 1 %      Lymphocytes Relative 14 %      Monocytes Relative 2 %      Eosinophils Relative 1 %      Basophils Relative 0 %      Neutrophils Absolute 8 79 Thousands/µL      Immature Grans Absolute 0 08 Thousand/uL      Lymphocytes Absolute 1 48 Thousands/µL      Monocytes Absolute 0 23 Thousand/µL      Eosinophils Absolute 0 08 Thousand/µL      Basophils Absolute 0 01 Thousands/µL     POCT pregnancy, urine [[id number]]  (Abnormal) Resulted: 10/11/22 2255    Lab Status: Final result Specimen: Urine Updated: 10/11/22 2255     EXT PREG TEST UR (Ref: Negative) positive     Control valid                 US OB pregnancy limited with transvaginal   Final Result by Frank Noyola DO (10/12 0218)      Small anechoic structure within the endometrial cavity likely representing early gestational sac  Mean sac diameter 5 mm, too small for accurate dating  Short-term follow-up ultrasound with beta hCG trending recommended  Workstation performed: [id number]         US right upper quadrant   Final Result by Luis Antonio Clemons DO (10/12 0214)      Hepatomegaly and hepatic steatosis  Workstation performed: [id number]               Procedures  Procedures      ED Course  ED Course as of 10/12/22 0342   Wed Oct 12, 2022   0015 (+) urine pregnancy test   0030 AST(!): 149   0034 ALT(!): 110   0054 WBC(!): 10 67   0137 HCG QUANTITATIVE(!): 4,226   0228 Pt re-eval; pt denies all sx's  Feels much better  Nontender ab exam improved from initial generalized tenderness  Has not vomited while in ED  Pt given Pedialyte for PO challenge  Will re-evaluate shortly   0301 Pt re-eval; tolerating PO without difficulty  Non-tender ab exam  Will discharge with obgyn referral   0308 Note: all prior BP measurements taken with a BP cuff which was too small  NH=232/60 with appropriately sized cuff                                       MDM  Number of Diagnoses or Management Options  Abdominal pain  Hepatic steatosis  Hepatomegaly  Less than 8 weeks gestation of pregnancy  Nausea & vomiting  Diagnosis management comments: 70-year-old female  at 6 weeks 2 days gestation presenting to the ED due to abdominal pain, nausea, vomiting, diarrhea  RUQ US showed hepatic steatosis and hepatomegaly which pt was already aware of  Quantitative hCG = 4226  Transvaginal ultrasound revealed a 5 mm gestational sac within endometrial cavity which was too small for accurate dating  Patient will need follow-up beta HCG and ultrasound within 48 hours from discharge from the ED as we are unable to confirm definitive IUP, pt is aware, plans to f/u with her OBGYN  Patient was given Pepcid, Mylanta, Tylenol, Zofran for her symptoms  Did not vomit while in the ED        By time of discharge, was tolerating p o , nontender abdomen  Strict return precautions  Will discharge with pepcid and zofran  Of note; pt had multiple logged BP's which were taken with innapropriately sized cuff which overestimated her BP  BP with proper size cuff=143/60  Amount and/or Complexity of Data Reviewed  Clinical lab tests: ordered and reviewed  Tests in the radiology section of CPT®: ordered and reviewed  Tests in the medicine section of CPT®: reviewed and ordered  Review and summarize past medical records: yes  Discuss the patient with other providers: yes  Independent visualization of images, tracings, or specimens: yes    Risk of Complications, Morbidity, and/or Mortality  Presenting problems: moderate  Diagnostic procedures: moderate  Management options: moderate    Patient Progress  Patient progress: stable      Disposition  Final diagnoses:   Abdominal pain   Nausea & vomiting   Hepatomegaly   Hepatic steatosis   Less than 8 weeks gestation of pregnancy     Time reflects when diagnosis was documented in both MDM as applicable and the Disposition within this note     Time User Action Codes Description Comment    10/12/2022  2:55 AM Martita Fam Add [R10 9] Abdominal pain     10/12/2022  2:55 AM Muna Conway Add [R11 2] Nausea & vomiting     10/12/2022  2:55 AM Martita Fam Add [R16 0] Hepatomegaly     10/12/2022  2:55 AM Martita Fam Add [K76 0] Hepatic steatosis     10/12/2022  2:56 AM Muna Conway Add [Z3A 01] Less than 8 weeks gestation of pregnancy       ED Disposition     ED Disposition   Discharge    Condition   Stable    Date/Time   Wed Oct 12, 2022  2:56 AM    Comment   Magi Choi discharge to home/self care                 Follow-up Information     Follow up With Specialties Details Why [address] [facility]ne[facility] Schedule an appointment as soon as possible for a visit today for prenatal care follow up [address] [facility] [address] [facility], [address]  [phone]          Discharge Medication List as of 10/12/2022  3:00 AM      START taking these medications    Details   famotidine (PEPCID) 20 mg tablet Take 1 tablet (20 mg total) by mouth daily as needed for heartburn, Starting Wed 10/12/2022, Until Tue 1/10/2023 at 2359, Normal      ondansetron (Zofran) 4 mg tablet Take 1 tablet (4 mg total) by mouth every 8 (eight) hours as needed for nausea or vomiting for up to 7 days, Starting Wed 10/12/2022, Until Wed 10/19/2022 at 2359, Print         CONTINUE these medications which have NOT CHANGED    Details   Blood Glucose Monitoring Suppl (OneTouch Verio Reflect) w/Device KIT Use 1 kit 2 (two) times a day E11 65, Please substitute with appropriate alternative as covered by patient's insurance , Starting Fri 11/12/2021, Normal      ferrous sulfate 325 (65 Fe) mg tablet TAKE 1 TABLET BY MOUTH 2 TIMES A DAY WITH MEALS, Normal      glimepiride (AMARYL) 1 mg tablet Take 1 tablet (1 mg total) by mouth daily with breakfast, Starting Tue 8/23/2022, Until Sun 2/19/2023, Normal      glucose blood (OneTouch Verio) test strip E11 65, Please substitute with appropriate alternative as covered by patient's insurance , Normal      ibuprofen (MOTRIN) 400 mg tablet Take 1 tablet (400 mg total) by mouth every 6 (six) hours as needed for mild pain for up to 3 days, Starting Wed 8/4/2021, Until Fri 11/12/2021 at 2359, Normal      metFORMIN (GLUCOPHAGE-XR) 500 mg 24 hr tablet Take 2 tablets (1,000 mg total) by mouth 2 (two) times a day with meals, Starting Tue 8/23/2022, Until Sun 2/19/2023, Normal      OneTouch Delica Lancets 19T MISC Use 2 (two) times a day E11 65, Please substitute with appropriate alternative as covered by patient's insurance , Starting Fri 11/12/2021, Normal      zolpidem (AMBIEN) 5 mg tablet Take 1 tablet (5 mg total) by mouth daily at bedtime as needed for sleep, Starting Tue 12/7/2021, Normal               PDMP Review None           ED Provider  Attending physically available and evaluated Gracie Horacio MCCOLLUM managed the patient along with the ED Attending      Electronically Signed by         Isaias Martin MD  10/12/22 1356

## 2022-10-12 NOTE — Clinical Note
Don Timmons was seen and treated in our emergency department on 10/11/2022  Diagnosis:     Avi Umaña  may return to work on return date  She may return on this date: 10/14/2022    Can return to work sooner if feeling okay     If you have any questions or concerns, please don't hesitate to call        Shay Solorio MD    ______________________________           _______________          _______________  Hospital Representative                              Date                                Time

## 2022-10-12 NOTE — Clinical Note
accompanied Yadiel Serrano to the emergency department on 10/11/2022  Return date if applicable: 08/20/9781        If you have any questions or concerns, please don't hesitate to call        Zach Barnard MD

## 2022-10-13 ENCOUNTER — OFFICE VISIT (OUTPATIENT)
Dept: DIABETES SERVICES | Facility: CLINIC | Age: 22
End: 2022-10-13
Payer: MEDICARE

## 2022-10-13 VITALS — BODY MASS INDEX: 56.58 KG/M2 | WEIGHT: 293 LBS

## 2022-10-13 DIAGNOSIS — E11.65 UNCONTROLLED TYPE 2 DIABETES MELLITUS WITH HYPERGLYCEMIA (HCC): Primary | ICD-10-CM

## 2022-10-13 PROCEDURE — 97802 MEDICAL NUTRITION INDIV IN: CPT

## 2022-10-13 NOTE — PATIENT INSTRUCTIONS
Consume 3 meals a day, 4-5 hours apart  Try not to skip any meals  Aim for 60 grams carbohydrates per meal and 15 grams of carbohydrates per snack in between  Include whole grains, non starchy vegetables and have at least 2 whole fruits in a day  Replace sugar sweetened beverages with water wherever possible

## 2022-10-13 NOTE — PROGRESS NOTES
Medical Nutrition Therapy        Assessment    Visit Type: Initial visit  Chief complaint/Medical Diagnosis/reason for visit E11 65 Uncontrolled T2DM with hyperglycemia  HPI Lexx Gilmore was seen today for her initial MNT visit  Lexx Gilmore informed that she is 6 weeks pregnant and has some nausea and vomiting in the morning these days  She has a salty tooth and craves for foods like popcorn and chips  She has an active job from 8:00-4:00pm with lunches when taken are from outside and is mostly preparing her dinners at home  She usually skips her lunches almost 3 times in a week  Fasting BG levels checked in office today was 193 mg/dl  Problems identified in food recall include inconsistent carbohydrates and meal skipping  Provided patient with a [id number] calorie meal plan to assist with consistency, balance and portion control  Encouraged the consumption of regular meals at regular times  Advised patient to keep carbohydrate intake to 60 grams per meal and 15 grams per snack to assist with glycemic control  Suggested keeping protein intake to 10 ounces a day and fat to 5-6 servings daily to assist with lipid management and calorie control  Portion booklet and food labels were used to teach basic carbohydrate counting  Patient agreed to keep daily food logs and return them in one week for assessment  RD will remain available for further dietary questions/concerns       Ht Readings from Last 1 Encounters:   08/23/22 5' 3" (1 6 m)     Wt Readings from Last 3 Encounters:   10/13/22 (!) 145 kg (319 lb 6 4 oz)   08/23/22 (!) 146 kg (322 lb)   07/13/22 (!) 146 kg (322 lb 3 2 oz)     Weight Change: No    Barriers to Learning: no barriers    Do you follow any special diet presently?: No  Who shops: patient and boyfriend  Who cooks: patient and boyfriend    Food Log: Completed via the method of food recall    Wake-up: 6:00-7:00am  Breakfast 7:30am: 3 egg whites, 2 cups cooked oatmeal, 4 cups 2% milk, coffee with 4-5 tsp white sugar, 1/4 cup half and half  Morning Snack:none  Lunch usually skips 3 times a week or 3pm: 1 wrap - spinach, egg whites, feta cheese, camryn iced tea, gatorade  Afternoon Snack: none  Dinner 8pm (cook meals at home): 2 cups cooked rice and 1 cup cooked red/black kidney beans, chicken breast 4-6 oz, fish (tilapia), 1/4 cup potato, 2-3 cups cooked pasta with tomato or kaylah sauce  Evening Snack: chips or popcorn 1 cup, doritos or cheetos / 1 raysa almond bar  Beverages:  camryn iced tea, gatorade, water (but not much)  Eating out/Take out: 5 days a week mostly wraps, hamburger, fries  Exercise walking 20 min 5 times a week     Calorie needs 8197-2865 kcals/day Carbs: 60 g/meal, 15 g/snack     Fat: 5-6 servings/day    Protein:10 ounces/day    Nutrition Diagnosis:  Food and nutrition related knowledge deficit  related to Lack of prior exposure to accurate nutrition related information as evidenced by No prior knowledge of need for food and nutrition related recommendations    Intervention: increased fiber intake, label reading, behavior modification strategies, carbohydrate counting, meal timing and individualized meal plan     Treatment Goals: Patient will consume 3 meals a day, Patient will consume 25-35 grams of fiber a day, Patient will count carbohydrates and Patient will monitor blood glucose    Monitoring and evaluation:    Term code indicator  FH 1 3 2 Food Intake Criteria: Consume 3 meals a day, 4-5 hours apart  Try not to skip any meals  Term code indicator  FH 1 6 3 Carbohydrate Intake Criteria: Aim for 60 grams carbohydrates per meal and 15 grams of carbohydrates per snack in between  Term code indicator  FH 1 6 4 Fiber Intake Criteria: Include whole grains, non starchy vegetables and have at least 2 whole fruits in a day  Term code indicator  FH 1 3 1 Fluid/Beverage Intake Criteria: Replace sugar sweetened beverages with water wherever possible      Materials Provided: portion booklet, food record log    Patient’s Response to Instruction:  Yecenia Lynch  Expected Compliancegood    Start- Stop: 9:05-10:05  Total Minutes: 61 Minutes  Group or Individual Instruction: MNT-I  Other: KELLY Murrell      Thank you for coming to the [address] [facility] for education today  Please feel free to call with any questions or concerns      [address] [address]

## 2022-10-16 ENCOUNTER — APPOINTMENT (EMERGENCY)
Dept: ULTRASOUND IMAGING | Facility: HOSPITAL | Age: 22
End: 2022-10-16
Payer: MEDICARE

## 2022-10-16 ENCOUNTER — HOSPITAL ENCOUNTER (EMERGENCY)
Facility: HOSPITAL | Age: 22
Discharge: HOME | End: 2022-10-16
Attending: EMERGENCY MEDICINE
Payer: MEDICARE

## 2022-10-16 VITALS
RESPIRATION RATE: 18 BRPM | WEIGHT: 293 LBS | BODY MASS INDEX: 51.91 KG/M2 | TEMPERATURE: 98.2 F | SYSTOLIC BLOOD PRESSURE: 140 MMHG | OXYGEN SATURATION: 99 % | HEART RATE: 89 BPM | HEIGHT: 63 IN | DIASTOLIC BLOOD PRESSURE: 80 MMHG

## 2022-10-16 DIAGNOSIS — O20.0 THREATENED ABORTION: Primary | ICD-10-CM

## 2022-10-16 LAB
ALBUMIN SERPL BCP-MCNC: 3.7 G/DL (ref 3.5–5)
ALP SERPL-CCNC: 84 U/L (ref 34–104)
ALT SERPL W P-5'-P-CCNC: 268 U/L (ref 7–52)
ANION GAP SERPL CALCULATED.3IONS-SCNC: 8 MMOL/L (ref 4–13)
AST SERPL W P-5'-P-CCNC: 240 U/L (ref 13–39)
B-HCG SERPL-ACNC: ABNORMAL MIU/ML (ref 0–11.6)
BACTERIA UR QL AUTO: ABNORMAL /HPF
BASOPHILS # BLD AUTO: 0.02 THOUSANDS/ÂΜL (ref 0–0.1)
BASOPHILS NFR BLD AUTO: 0 % (ref 0–1)
BILIRUB SERPL-MCNC: 0.42 MG/DL (ref 0.2–1)
BILIRUB UR QL STRIP: NEGATIVE
BUN SERPL-MCNC: 6 MG/DL (ref 5–25)
CALCIUM SERPL-MCNC: 8.6 MG/DL (ref 8.4–10.2)
CHLORIDE SERPL-SCNC: 104 MMOL/L (ref 96–108)
CLARITY UR: CLEAR
CO2 SERPL-SCNC: 23 MMOL/L (ref 21–32)
COLOR UR: YELLOW
CREAT SERPL-MCNC: 0.47 MG/DL (ref 0.6–1.3)
EOSINOPHIL # BLD AUTO: 0.1 THOUSAND/ÂΜL (ref 0–0.61)
EOSINOPHIL NFR BLD AUTO: 1 % (ref 0–6)
ERYTHROCYTE [DISTWIDTH] IN BLOOD BY AUTOMATED COUNT: 17.6 % (ref 11.6–15.1)
GFR SERPL CREATININE-BSD FRML MDRD: 140 ML/MIN/1.73SQ M
GLUCOSE SERPL-MCNC: 145 MG/DL (ref 65–140)
GLUCOSE UR STRIP-MCNC: NEGATIVE MG/DL
HCT VFR BLD AUTO: 32.3 % (ref 34.8–46.1)
HGB BLD-MCNC: 9.5 G/DL (ref 11.5–15.4)
HGB UR QL STRIP.AUTO: NEGATIVE
IMM GRANULOCYTES # BLD AUTO: 0.04 THOUSAND/UL (ref 0–0.2)
IMM GRANULOCYTES NFR BLD AUTO: 1 % (ref 0–2)
KETONES UR STRIP-MCNC: NEGATIVE MG/DL
LEUKOCYTE ESTERASE UR QL STRIP: ABNORMAL
LYMPHOCYTES # BLD AUTO: 2.4 THOUSANDS/ÂΜL (ref 0.6–4.47)
LYMPHOCYTES NFR BLD AUTO: 27 % (ref 14–44)
MCH RBC QN AUTO: 21.1 PG (ref 26.8–34.3)
MCHC RBC AUTO-ENTMCNC: 29.4 G/DL (ref 31.4–37.4)
MCV RBC AUTO: 72 FL (ref 82–98)
MONOCYTES # BLD AUTO: 0.33 THOUSAND/ÂΜL (ref 0.17–1.22)
MONOCYTES NFR BLD AUTO: 4 % (ref 4–12)
MUCOUS THREADS UR QL AUTO: ABNORMAL
NEUTROPHILS # BLD AUTO: 5.93 THOUSANDS/ÂΜL (ref 1.85–7.62)
NEUTS SEG NFR BLD AUTO: 67 % (ref 43–75)
NITRITE UR QL STRIP: NEGATIVE
NON-SQ EPI CELLS URNS QL MICRO: ABNORMAL /HPF
NRBC BLD AUTO-RTO: 0 /100 WBCS
PH UR STRIP.AUTO: 6 [PH]
PLATELET # BLD AUTO: 257 THOUSANDS/UL (ref 149–390)
PMV BLD AUTO: 11.4 FL (ref 8.9–12.7)
POTASSIUM SERPL-SCNC: 3.4 MMOL/L (ref 3.5–5.3)
PROT SERPL-MCNC: 7.1 G/DL (ref 6.4–8.4)
PROT UR STRIP-MCNC: ABNORMAL MG/DL
RBC # BLD AUTO: 4.51 MILLION/UL (ref 3.81–5.12)
RBC #/AREA URNS AUTO: ABNORMAL /HPF
SODIUM SERPL-SCNC: 135 MMOL/L (ref 135–147)
SP GR UR STRIP.AUTO: 1.03 (ref 1–1.03)
UROBILINOGEN UR STRIP-ACNC: 2 MG/DL
WBC # BLD AUTO: 8.82 THOUSAND/UL (ref 4.31–10.16)
WBC #/AREA URNS AUTO: ABNORMAL /HPF

## 2022-10-16 PROCEDURE — 99282 EMERGENCY DEPT VISIT SF MDM: CPT | Performed by: EMERGENCY MEDICINE

## 2022-10-16 PROCEDURE — 99284 EMERGENCY DEPT VISIT MOD MDM: CPT

## 2022-10-16 PROCEDURE — 80053 COMPREHEN METABOLIC PANEL: CPT | Performed by: EMERGENCY MEDICINE

## 2022-10-16 PROCEDURE — 76815 OB US LIMITED FETUS(S): CPT

## 2022-10-16 PROCEDURE — 81001 URINALYSIS AUTO W/SCOPE: CPT | Performed by: EMERGENCY MEDICINE

## 2022-10-16 PROCEDURE — 85025 COMPLETE CBC W/AUTO DIFF WBC: CPT | Performed by: EMERGENCY MEDICINE

## 2022-10-16 PROCEDURE — 36415 COLL VENOUS BLD VENIPUNCTURE: CPT | Performed by: EMERGENCY MEDICINE

## 2022-10-16 PROCEDURE — 84702 CHORIONIC GONADOTROPIN TEST: CPT | Performed by: EMERGENCY MEDICINE

## 2022-10-16 NOTE — ED PROVIDER NOTES
History  Chief Complaint   Patient presents with   • Vaginal Bleeding - Pregnant     Pt complains of vaginal bleeding this morning when she was showering with some green discharge  Pt states that she is 6 weeks pregnant  Pt's family has Hx of complicated pregnancy     24 y/o F  presents w vag spotting for the past 1 day  No abd pain  No pelvic cramps  No dysuria or hematuria  No flank pain  No n/v  No fevers chills  No nausea vomiting  History provided by:  Patient      Prior to Admission Medications   Prescriptions Last Dose Informant Patient Reported? Taking? Blood Glucose Monitoring Suppl (OneTouch Verio Reflect) w/Device KIT   No No   Sig: Use 1 kit 2 (two) times a day E11 65, Please substitute with appropriate alternative as covered by patient's insurance  OneTouch Delica Lancets 85Y MISC   No No   Sig: Use 2 (two) times a day E11 65, Please substitute with appropriate alternative as covered by patient's insurance  famotidine (PEPCID) 20 mg tablet   No No   Sig: Take 1 tablet (20 mg total) by mouth daily as needed for heartburn   ferrous sulfate 325 (65 Fe) mg tablet   No No   Sig: TAKE 1 TABLET BY MOUTH 2 TIMES A DAY WITH MEALS   glimepiride (AMARYL) 1 mg tablet   No No   Sig: Take 1 tablet (1 mg total) by mouth daily with breakfast   glucose blood (OneTouch Verio) test strip   No No   Sig: E11 65, Please substitute with appropriate alternative as covered by patient's insurance     ibuprofen (MOTRIN) 400 mg tablet   No No   Sig: Take 1 tablet (400 mg total) by mouth every 6 (six) hours as needed for mild pain for up to 3 days   metFORMIN (GLUCOPHAGE-XR) 500 mg 24 hr tablet   No No   Sig: Take 2 tablets (1,000 mg total) by mouth 2 (two) times a day with meals   ondansetron (Zofran) 4 mg tablet   No No   Sig: Take 1 tablet (4 mg total) by mouth every 8 (eight) hours as needed for nausea or vomiting for up to 7 days   zolpidem (AMBIEN) 5 mg tablet   No No   Sig: Take 1 tablet (5 mg total) by mouth daily at bedtime as needed for sleep   Patient not taking: No sig reported      Facility-Administered Medications: None       Past Medical History:   Diagnosis Date   • Anxiety    • Asthma     childhood   • Chronic headache    • Current moderate episode of major depressive disorder ([facility] ) 9/29/2015   • Dextroscoliosis 4/5/2016   • Diabetes mellitus ([facility] )    • Elevated LFTs    • Migraine without aura and without status migrainosus, not intractable 8/16/2019   • Plantar fasciitis 6/18/2020   • Pre-diabetes    • Self-injurious behavior     when 15 y/o       Past Surgical History:   Procedure Laterality Date   • BREAST CYST INCISION AND DRAINAGE Right 07/25/2017    Procedure: INCISION AND DRAINAGE (I&D) BREAST;  Surgeon: Omi Castillo DO;  Location: [facility];  Service: General   • TONSILLECTOMY  2016       Family History   Problem Relation Age of Onset   • Hypertension Mother    • Hyperlipidemia Mother    • Diabetes Mother    • Depression Mother    • Thyroid disease Sister    • Anemia Sister    • Depression Sister    • Anxiety disorder Sister    • Obesity Family    • Allergies Family    • Diabetes Family    • Appendicitis Maternal Grandmother    • Aneurysm Paternal Grandfather    • Anemia Sister    • Anemia Sister    • Prostate cancer Paternal Uncle    • Substance Abuse Neg Hx    • Mental illness Neg Hx    • Stroke Neg Hx      I have reviewed and agree with the history as documented      E-Cigarette/Vaping   • E-Cigarette Use Never User      E-Cigarette/Vaping Substances   • Nicotine No    • THC No    • CBD No    • Flavoring No    • Other No    • Unknown No      Social History     Tobacco Use   • Smoking status: Never Smoker   • Smokeless tobacco: Never Used   Vaping Use   • Vaping Use: Never used   Substance Use Topics   • Alcohol use: Not Currently     Comment: OCCASIONAL/SOCIAL   • Drug use: Not Currently     Types: Marijuana       Review of Systems   Constitutional: Negative for activity change, appetite change, chills, diaphoresis, fatigue and fever  HENT: Negative for congestion, dental problem, drooling, ear discharge, ear pain, facial swelling, hearing loss, mouth sores, nosebleeds, rhinorrhea and sneezing  Eyes: Negative for photophobia, pain, discharge, redness and itching  Respiratory: Negative for apnea, cough, choking, chest tightness, shortness of breath, wheezing and stridor  Cardiovascular: Negative for chest pain  Gastrointestinal: Negative for abdominal distention  Endocrine: Negative for cold intolerance, heat intolerance, polydipsia, polyphagia and polyuria  Genitourinary: Negative for difficulty urinating, dyspareunia and dysuria  Musculoskeletal: Negative for arthralgias  Skin: Negative for color change  Allergic/Immunologic: Negative for environmental allergies  Neurological: Negative for dizziness  Hematological: Negative for adenopathy  Psychiatric/Behavioral: Negative for agitation  Physical Exam  Physical Exam  Constitutional:       Appearance: Normal appearance  HENT:      Head: Normocephalic  Nose: Nose normal  No congestion  Mouth/Throat:      Mouth: Mucous membranes are dry  Eyes:      Extraocular Movements: Extraocular movements intact  Pupils: Pupils are equal, round, and reactive to light  Cardiovascular:      Rate and Rhythm: Normal rate  Pulses: Normal pulses  Pulmonary:      Effort: Pulmonary effort is normal    Abdominal:      General: There is no distension  Palpations: Abdomen is soft  Tenderness: There is no abdominal tenderness  There is no guarding  Musculoskeletal:         General: Normal range of motion  Cervical back: Normal range of motion and neck supple  Skin:     Capillary Refill: Capillary refill takes less than 2 seconds  Neurological:      General: No focal deficit present  Mental Status: She is alert     Psychiatric:         Mood and Affect: Mood normal          Vital Signs  ED Triage Vitals [10/16/22 1416]   Temperature Pulse Respirations Blood Pressure SpO2   98 2 °F (36 8 °C) 97 16 157/74 99 %      Temp Source Heart Rate Source Patient Position - Orthostatic VS BP Location FiO2 (%)   Oral Monitor Lying Right arm --      Pain Score       --           Vitals:    10/16/22 1416 10/16/22 1743   BP: 157/74 141/82   Pulse: 97 91   Patient Position - Orthostatic VS: Lying Sitting         Visual Acuity      ED Medications  Medications - No data to display    Diagnostic Studies  Results Reviewed     Procedure Component Value Units Date/Time    Urinalysis with microscopic [[id number]]  (Abnormal) Collected: 10/16/22 1630    Lab Status: Final result Specimen: Urine, Clean Catch Updated: 10/16/22 1650     Color, UA Yellow     Clarity, UA Clear     Specific Gravity, UA 1 028     pH, UA 6 0     Leukocytes, UA Moderate     Nitrite, UA Negative     Protein, UA 30 (1+) mg/dl      Glucose, UA Negative mg/dl      Ketones, UA Negative mg/dl      Urobilinogen, UA 2 0 mg/dl      Bilirubin, UA Negative     Occult Blood, UA Negative     RBC, UA 2-4 /hpf      WBC, UA 4-10 /hpf      Epithelial Cells Occasional /hpf      Bacteria, UA Occasional /hpf      MUCUS THREADS Moderate    hCG, quantitative [[id number]]  (Abnormal) Collected: 10/16/22 1516    Lab Status: Final result Specimen: Blood from Arm, Right Updated: 10/16/22 1629     HCG, Quant 18,015 mIU/mL     Narrative:       Expected Ranges:     Approximate               Approximate HCG  Gestation age          Concentration ( mIU/mL)  _____________          ______________________   Mary Ellen McNabb                      HCG values  0 2-1                       5-50  1-2                           2-3                         100-5000  3-4                         500-39389  4-5                         1000-74351  5-6                         [id number]  6-8                         58662-987340  8-12                        02358-932348      Comprehensive metabolic panel [[id number]]  (Abnormal) Collected: 10/16/22 1516    Lab Status: Final result Specimen: Blood from Arm, Right Updated: 10/16/22 1551     Sodium 135 mmol/L      Potassium 3 4 mmol/L      Chloride 104 mmol/L      CO2 23 mmol/L      ANION GAP 8 mmol/L      BUN 6 mg/dL      Creatinine 0 47 mg/dL      Glucose 145 mg/dL      Calcium 8 6 mg/dL       U/L       U/L      Alkaline Phosphatase 84 U/L      Total Protein 7 1 g/dL      Albumin 3 7 g/dL      Total Bilirubin 0 42 mg/dL      eGFR 140 ml/min/1 73sq m     Narrative:      National Kidney Disease Foundation guidelines for Chronic Kidney Disease (CKD):   •  Stage 1 with normal or high GFR (GFR > 90 mL/min/1 73 square meters)  •  Stage 2 Mild CKD (GFR = 60-89 mL/min/1 73 square meters)  •  Stage 3A Moderate CKD (GFR = 45-59 mL/min/1 73 square meters)  •  Stage 3B Moderate CKD (GFR = 30-44 mL/min/1 73 square meters)  •  Stage 4 Severe CKD (GFR = 15-29 mL/min/1 73 square meters)  •  Stage 5 End Stage CKD (GFR <15 mL/min/1 73 square meters)  Note: GFR calculation is accurate only with a steady state creatinine    CBC and differential [[id number]]  (Abnormal) Collected: 10/16/22 1516    Lab Status: Final result Specimen: Blood from Arm, Right Updated: 10/16/22 1535     WBC 8 82 Thousand/uL      RBC 4 51 Million/uL      Hemoglobin 9 5 g/dL      Hematocrit 32 3 %      MCV 72 fL      MCH 21 1 pg      MCHC 29 4 g/dL      RDW 17 6 %      MPV 11 4 fL      Platelets 852 Thousands/uL      nRBC 0 /100 WBCs      Neutrophils Relative 67 %      Immat GRANS % 1 %      Lymphocytes Relative 27 %      Monocytes Relative 4 %      Eosinophils Relative 1 %      Basophils Relative 0 %      Neutrophils Absolute 5 93 Thousands/µL      Immature Grans Absolute 0 04 Thousand/uL      Lymphocytes Absolute 2 40 Thousands/µL      Monocytes Absolute 0 33 Thousand/µL      Eosinophils Absolute 0 10 Thousand/µL      Basophils Absolute 0 02 Thousands/µL                  US OB pregnancy limited with transvaginal   Final Result by Blayne Cali MD (70/23 3258)      Single intrauterine gestation at 5 weeks 2 days (range +/- 0W 3D)  CALI of 6/16/2023  Workstation performed: [id number]                    Procedures  Procedures         ED Course        ultrasound shows IUP  Patient is approximately 5 weeks gestation  She feels comfortable going home with outpatient follow-up  Cervical os is closed  Threatened miscarriage  Return precautions provided  Patient has an appointment with her OBGYN on Monday  Full DC instructions discussed and patient knows when to seek immediate medical attention  Patient has proper follow-up  All the results discussed with the patient and patient knows that they may require further workup  Limitation of the ED workup discussed  All questions and concerns from patient or family addressed  Understanding of the instructions verbalized  SBIRT 22yo+    Flowsheet Row Most Recent Value   SBIRT (23 yo +)    In order to provide better care to our patients, we are screening all of our patients for alcohol and drug use  Would it be okay to ask you these screening questions? Yes Filed at: 10/16/2022 1517   Initial Alcohol Screen: US AUDIT-C     1  How often do you have a drink containing alcohol? 0 Filed at: 10/16/2022 1517   2  How many drinks containing alcohol do you have on a typical day you are drinking? 0 Filed at: 10/16/2022 1517   3a  Male UNDER 65: How often do you have five or more drinks on one occasion? 0 Filed at: 10/16/2022 1517   3b  FEMALE Any Age, or MALE 65+: How often do you have 4 or more drinks on one occassion? 0 Filed at: 10/16/2022 1517   Audit-C Score 0 Filed at: 10/16/2022 1517   ZAN: How many times in the past year have you    Used an illegal drug or used a prescription medication for non-medical reasons?  Never Filed at: 10/16/2022 1517                    MDM  Number of Diagnoses or Management Options      Disposition  Final diagnoses:   Threatened      Time reflects when diagnosis was documented in both MDM as applicable and the Disposition within this note     Time User Action Codes Description Comment    10/16/2022  5:49 PM Kirsten Heath Add [O20 0] Threatened        ED Disposition     ED Disposition   Discharge    Condition   Stable    Date/Time   Sun Oct 16, 2022 [address] discharge to home/self care  Follow-up Information     Follow up With Specialties Details Why Contact Info    Amie Lewis, [address], Nurse Practitioner   [address]  [phone]            Patient's Medications   Discharge Prescriptions    No medications on file       No discharge procedures on file      PDMP Review     None          ED Provider  Electronically Signed by           Levi Mercado DO  10/16/22 2329

## 2022-10-17 ENCOUNTER — OFFICE VISIT (OUTPATIENT)
Dept: OBGYN CLINIC | Facility: CLINIC | Age: 22
End: 2022-10-17
Payer: MEDICARE

## 2022-10-17 VITALS
DIASTOLIC BLOOD PRESSURE: 72 MMHG | BODY MASS INDEX: 51.91 KG/M2 | WEIGHT: 293 LBS | SYSTOLIC BLOOD PRESSURE: 110 MMHG | HEIGHT: 63 IN

## 2022-10-17 DIAGNOSIS — Z3A.01 5 WEEKS GESTATION OF PREGNANCY: Primary | ICD-10-CM

## 2022-10-17 DIAGNOSIS — E11.65 UNCONTROLLED TYPE 2 DIABETES MELLITUS WITH HYPERGLYCEMIA (HCC): ICD-10-CM

## 2022-10-17 PROCEDURE — 99213 OFFICE O/P EST LOW 20 MIN: CPT | Performed by: OBSTETRICS & GYNECOLOGY

## 2022-10-17 NOTE — PROGRESS NOTES
Assessment/Plan:    5 weeks gestation of pregnancy  - reviewed ultrasound pictures from her prior 2 ultrasounds  - will return in 3 weeks for her dating ultrasound  - recommended vitamin B6 and Unisom for nausea  - recommended patient restart her metformin and glipizide  - referral placed for Diabetes Education  - reviewed bleeding precautions   Uncontrolled type 2 diabetes mellitus with hyperglycemia ([facility] )  -     Ambulatory Referral to Maternal Fetal Medicine; Future        Subjective:      Patient ID: Jt Dupree is a 25 y o  female  HPI  49-year-old  presents to the office as an ER follow-up  She was seen yesterday in the ER for spotting in pregnancy  She was scanned and found to have a 5 week pregnancy that contained yolk sac but not a fetal pole  A week before that she had been seen in and ultrasound confirmed a gestational sac  She has had an appropriate increase in her quant  She is not bleeding and does not have pain now  This was a unplanned but desired pregnancy  She was told by her previous provider it was unlikely for her to get pregnant given her PCOS and uncontrolled diabetes  She has currently stopped her diabetes medication  The following portions of the patient's history were reviewed and updated as appropriate: allergies, current medications, past family history, past medical history, past social history, past surgical history and problem list     Review of Systems   Constitutional: Negative  HENT: Negative  Respiratory: Negative  Cardiovascular: Negative  Gastrointestinal: Negative  Genitourinary: Negative  Neurological: Negative  Psychiatric/Behavioral: Negative  Objective:      /72 (BP Location: Right arm, Patient Position: Sitting, Cuff Size: Standard)   Ht 5' 3" (1 6 m)   Wt (!) 144 kg (318 lb)   LMP 2022   BMI 56 33 kg/m²          Physical Exam  Vitals and nursing note reviewed     Constitutional:       Appearance: Normal appearance  HENT:      Head: Normocephalic and atraumatic  Eyes:      Extraocular Movements: Extraocular movements intact  Conjunctiva/sclera: Conjunctivae normal       Pupils: Pupils are equal, round, and reactive to light  Pulmonary:      Effort: Pulmonary effort is normal    Skin:     General: Skin is warm and dry  Neurological:      General: No focal deficit present  Mental Status: She is alert and oriented to person, place, and time     Psychiatric:         Mood and Affect: Mood normal          Behavior: Behavior normal

## 2022-10-21 ENCOUNTER — TELEPHONE (OUTPATIENT)
Dept: PERINATAL CARE | Facility: OTHER | Age: 22
End: 2022-10-21

## 2022-10-21 NOTE — TELEPHONE ENCOUNTER
FYI -  We reached out to this pt and left a message three times    We were unable to schedule the pt for an appointment as indicated in your referral     Toney Luther Maternal Fetal Medicine  [facility]

## 2022-10-24 ENCOUNTER — OFFICE VISIT (OUTPATIENT)
Dept: PERINATAL CARE | Facility: CLINIC | Age: 22
End: 2022-10-24
Payer: MEDICARE

## 2022-10-24 ENCOUNTER — APPOINTMENT (OUTPATIENT)
Dept: LAB | Facility: CLINIC | Age: 22
End: 2022-10-24
Payer: MEDICARE

## 2022-10-24 VITALS
DIASTOLIC BLOOD PRESSURE: 74 MMHG | WEIGHT: 293 LBS | BODY MASS INDEX: 51.91 KG/M2 | HEIGHT: 63 IN | SYSTOLIC BLOOD PRESSURE: 122 MMHG

## 2022-10-24 DIAGNOSIS — E11.65 PRE-EXISTING TYPE 2 DIABETES MELLITUS WITH HYPERGLYCEMIA DURING PREGNANCY IN FIRST TRIMESTER (HCC): ICD-10-CM

## 2022-10-24 DIAGNOSIS — Z3A.01 LESS THAN 8 WEEKS GESTATION OF PREGNANCY: ICD-10-CM

## 2022-10-24 DIAGNOSIS — E55.9 VITAMIN D DEFICIENCY: Primary | ICD-10-CM

## 2022-10-24 DIAGNOSIS — O24.111 PRE-EXISTING TYPE 2 DIABETES MELLITUS WITH HYPERGLYCEMIA DURING PREGNANCY IN FIRST TRIMESTER (HCC): ICD-10-CM

## 2022-10-24 DIAGNOSIS — R79.89 ELEVATED LFTS: ICD-10-CM

## 2022-10-24 DIAGNOSIS — E66.01 MORBID OBESITY WITH BMI OF 50.0-59.9, ADULT (HCC): ICD-10-CM

## 2022-10-24 DIAGNOSIS — O99.210 MATERNAL MORBID OBESITY, ANTEPARTUM (HCC): ICD-10-CM

## 2022-10-24 DIAGNOSIS — E66.01 MATERNAL MORBID OBESITY, ANTEPARTUM (HCC): ICD-10-CM

## 2022-10-24 LAB
CREAT UR-MCNC: 280 MG/DL
EST. AVERAGE GLUCOSE BLD GHB EST-MCNC: 214 MG/DL
HBA1C MFR BLD: 9.1 %
PROT UR-MCNC: 20 MG/DL
PROT/CREAT UR: 0.07 MG/G{CREAT} (ref 0–0.1)

## 2022-10-24 PROCEDURE — 36415 COLL VENOUS BLD VENIPUNCTURE: CPT | Performed by: NURSE PRACTITIONER

## 2022-10-24 PROCEDURE — 82570 ASSAY OF URINE CREATININE: CPT | Performed by: NURSE PRACTITIONER

## 2022-10-24 PROCEDURE — 83036 HEMOGLOBIN GLYCOSYLATED A1C: CPT | Performed by: NURSE PRACTITIONER

## 2022-10-24 PROCEDURE — 84156 ASSAY OF PROTEIN URINE: CPT | Performed by: NURSE PRACTITIONER

## 2022-10-24 RX ORDER — BLOOD-GLUCOSE SENSOR
EACH MISCELLANEOUS
Qty: 1 EACH | Refills: 12 | Status: SHIPPED | OUTPATIENT
Start: 2022-10-24

## 2022-10-24 RX ORDER — BLOOD-GLUCOSE TRANSMITTER
EACH MISCELLANEOUS
Qty: 1 EACH | Refills: 3 | Status: SHIPPED | OUTPATIENT
Start: 2022-10-24

## 2022-10-24 RX ORDER — CHOLECALCIFEROL (VITAMIN D3) 25 MCG
2 CAPSULE ORAL DAILY
Qty: 60 CAPSULE | Refills: 1 | Status: SHIPPED | OUTPATIENT
Start: 2022-10-24

## 2022-10-24 RX ORDER — INSULIN GLARGINE 100 [IU]/ML
30 INJECTION, SOLUTION SUBCUTANEOUS
Qty: 15 ML | Refills: 0 | Status: SHIPPED | OUTPATIENT
Start: 2022-10-24

## 2022-10-24 NOTE — PROGRESS NOTES
Assessment/Plan:    Pre-existing type 2 diabetes mellitus with hyperglycemia during pregnancy in first trimester ([address] )  -Last A1c 11 2%, goal is less than 6% with minimal hypoglycemia  -Repeat A1c, spot urine protein creatinine ratio    -Discontinue Glimepiride and Metformin    -Start Lantus 30 units at 9 PM daily    -Add 3 AM glucose check for next 3 days   -Be sure to have a bedtime snack with at least 15-30 grams of carbohydrates and 2 to 3 ounces of protein    -Send a message on Thursday, 10/27/2022   -Follow up with dietitian   -Keep 3 day food log to review with dietitian   -Start self monitoring blood glucose fasting and 2 hours after start of each meal  Keep glucose log  Glucose goals: fasting 60-90 mg/dL, 140 mg/dL or less 1 hour post meals, and 120 mg/dL or less 2 hours post meal; before meals/overnight   -Report glucose readings weekly via [employer] every Monday   -Start GDM 1800 calorie meal plan with 3 meals and 3 snacks including recommended combination of carb, protein and fat per meal/snack   -Please eat meal or snack every 2-3 5 hours while awake   -No more than 8 to 10 hours of fasting overnight   -Stay active if no restriction from your OB, walk up to 30 minutes a day  -Always have glucose available to treat hypoglycemia  Use 15:15 rule  Refer to hypoglycemia patient education sheet  Test blood sugar when experiencing signs and symptoms of hypoglycemia and prior to driving    -Start prenatal vitamin as recommended   -Continue iron supplement take with Vitamin C to increase absorption and take before a meal    -Add OTC D3 2000 iu capsule daily and take with dietary calcium    -Continue follow-up with your OB and MFM as recommended   -Stay in close contact with diabetes education team   -Insulin requirements during pregnancy; basal/bolus concept and Metformin discussed    -Very important to maintain tight glucose control during pregnancy to decrease risk factors including fetal macrosomia; birth injury; risk of ; polyhydramnios; pre-term labor; pre-eclampsia;  hypoglycemia; jaundice and stillbirth  -Diabetes and pregnancy booklet; diet plan and hypoglycemia patient education  Lab Results   Component Value Date    HGBA1C 11 2 (A) 2022       Obstructive sleep apnea  -Follow up with sleep specialist      Maternal morbid obesity, antepartum ([facility] [address] )  -First visit weight 317 lbs  -Recommended weight gain 11 to 20 lbs  -Start GDM diet and follow up with dietitian  Elevated LFTs  -Elevated AST/ALT  -Metformin discontinued for now  -Follow up with GI  Vitamin D deficiency  -Vitamin D level 15; add D3 2000 iu capsule, take with dietary calcium  Start prenatal vitamin  Diagnoses and all orders for this visit:    Vitamin D deficiency  -     Hemoglobin A1C; Standing  -     Cancel: Protein / creatinine ratio, urine  -     Hemoglobin A1C  -     Insulin Glargine Solostar (Lantus SoloStar) 100 UNIT/ML SOPN; Inject 0 3 mL (30 Units total) under the skin daily at bedtime At 9 PM daily  T2DM and pregnancy  -     Insulin Pen Needle 31G X 5 MM MISC; Inject under the skin daily at bedtime Use one a day or as directed  -     [facility] glucose flowsheet  -     Continuous Blood Gluc Transmit (Dexcom G6 Transmitter) MISC; Transmitter change every 90 days  -     Continuous Blood Gluc Sensor (Dexcom G6 Sensor) MISC; 1 box=1 month supply or 3 sensors, use 1 sensor every 10 days   -     Protein / creatinine ratio, urine  -     Cholecalciferol (Vitamin D-3) 25 MCG (1000 UT) CAPS; Take 2 capsules (2,000 Units total) by mouth daily Take with dietary calcium      Pre-existing type 2 diabetes mellitus with hyperglycemia during pregnancy in first trimester [facility])  -     Ambulatory Referral to Maternal Fetal Medicine  -     Hemoglobin A1C; Standing  -     Cancel: Protein / creatinine ratio, urine  -     Hemoglobin A1C  -     Insulin Glargine Solostar (Lantus SoloStar) 100 UNIT/ML SOPN; Inject 0 3 mL (30 Units total) under the skin daily at bedtime At 9 PM daily  T2DM and pregnancy  -     Insulin Pen Needle 31G X 5 MM MISC; Inject under the skin daily at bedtime Use one a day or as directed  -     [employer] glucose flowsheet  -     Continuous Blood Gluc Transmit (Dexcom G6 Transmitter) MISC; Transmitter change every 90 days  -     Continuous Blood Gluc Sensor (Dexcom G6 Sensor) MISC; 1 box=1 month supply or 3 sensors, use 1 sensor every 10 days   -     Protein / creatinine ratio, urine    Less than 8 weeks gestation of pregnancy  -     Hemoglobin A1C; Standing  -     Cancel: Protein / creatinine ratio, urine  -     Hemoglobin A1C  -     Insulin Glargine Solostar (Lantus SoloStar) 100 UNIT/ML SOPN; Inject 0 3 mL (30 Units total) under the skin daily at bedtime At 9 PM daily  T2DM and pregnancy  -     Insulin Pen Needle 31G X 5 MM MISC; Inject under the skin daily at bedtime Use one a day or as directed  -     [employer] glucose flowsheet  -     Continuous Blood Gluc Transmit (Dexcom G6 Transmitter) MISC; Transmitter change every 90 days  -     Continuous Blood Gluc Sensor (Dexcom G6 Sensor) MISC; 1 box=1 month supply or 3 sensors, use 1 sensor every 10 days   -     Protein / creatinine ratio, urine  -     Cholecalciferol (Vitamin D-3) 25 MCG (1000 UT) CAPS; Take 2 capsules (2,000 Units total) by mouth daily Take with dietary calcium  Maternal morbid obesity, antepartum (HCC)  -     Hemoglobin A1C; Standing  -     Cancel: Protein / creatinine ratio, urine  -     Hemoglobin A1C  -     Insulin Glargine Solostar (Lantus SoloStar) 100 UNIT/ML SOPN; Inject 0 3 mL (30 Units total) under the skin daily at bedtime At 9 PM daily  T2DM and pregnancy  -     Insulin Pen Needle 31G X 5 MM MISC; Inject under the skin daily at bedtime Use one a day or as directed  -     [employer] glucose flowsheet  -     Continuous Blood Gluc Transmit (Dexcom G6 Transmitter) MISC; Transmitter change every 90 days    - Continuous Blood Gluc Sensor (Dexcom G6 Sensor) MISC; 1 box=1 month supply or 3 sensors, use 1 sensor every 10 days   -     Protein / creatinine ratio, urine  -     Cholecalciferol (Vitamin D-3) 25 MCG (1000 UT) CAPS; Take 2 capsules (2,000 Units total) by mouth daily Take with dietary calcium  Morbid obesity with BMI of 50 0-59 9, adult (HCC)  -     Hemoglobin A1C; Standing  -     Cancel: Protein / creatinine ratio, urine  -     Hemoglobin A1C  -     Insulin Glargine Solostar (Lantus SoloStar) 100 UNIT/ML SOPN; Inject 0 3 mL (30 Units total) under the skin daily at bedtime At 9 PM daily  T2DM and pregnancy  -     Insulin Pen Needle 31G X 5 MM MISC; Inject under the skin daily at bedtime Use one a day or as directed  -     [employer] glucose flowsheet  -     Continuous Blood Gluc Transmit (Dexcom G6 Transmitter) MISC; Transmitter change every 90 days  -     Continuous Blood Gluc Sensor (Dexcom G6 Sensor) MISC; 1 box=1 month supply or 3 sensors, use 1 sensor every 10 days   -     Protein / creatinine ratio, urine  -     Cholecalciferol (Vitamin D-3) 25 MCG (1000 UT) CAPS; Take 2 capsules (2,000 Units total) by mouth daily Take with dietary calcium  Elevated LFTs  -     Hemoglobin A1C; Standing  -     Cancel: Protein / creatinine ratio, urine  -     Hemoglobin A1C  -     Insulin Glargine Solostar (Lantus SoloStar) 100 UNIT/ML SOPN; Inject 0 3 mL (30 Units total) under the skin daily at bedtime At 9 PM daily  T2DM and pregnancy  -     Insulin Pen Needle 31G X 5 MM MISC; Inject under the skin daily at bedtime Use one a day or as directed  -     [facility] glucose flowsheet  -     Continuous Blood Gluc Transmit (Dexcom G6 Transmitter) MISC; Transmitter change every 90 days    -     Continuous Blood Gluc Sensor (Dexcom G6 Sensor) MISC; 1 box=1 month supply or 3 sensors, use 1 sensor every 10 days   -     Protein / creatinine ratio, urine          Subjective:      Patient ID: Janessa Mcrae is a 25 y o  female  early pregnancy T2DM, dx 2019, history of pre-diabetes and PCOS  On Glimepiride and Metformin  Last A1c 11 2%  Currently not SMBG, glucose meter given during visit  Denies hypoglycemia  Eats 2 meals and 4 snacks a day  Works at [employer]  Is afraid needles but did return insulin pen demonstration without difficulty  Has follow up with dietitian  Reports going thru process for gastric sleeve and then decided not to have surgery  History of HARJIT- reports issue resolved; has follow up with sleep specialist  History of elevated liver enzymes; last normal levels noted 2016; followed by GI  Last had alcohol 10/1/2022 after finding out about pregnancy  Reports sister is LPN  In office glucose 157 fasting  Dexcom G6 started and insulin pen education provided by YOEL Julien during visit  Dexcom transmitter [facility] and sensor [id number]  HPI    The following portions of the patient's history were reviewed and updated as appropriate: allergies, current medications, past family history, past medical history, past social history, past surgical history and problem list     No Known Allergies  Current Outpatient Medications on File Prior to Visit   Medication Sig Dispense Refill   • Blood Glucose Monitoring Suppl (OneTouch Verio Reflect) w/Device KIT Use 1 kit 2 (two) times a day E11 65, Please substitute with appropriate alternative as covered by patient's insurance  1 kit 0   • ferrous sulfate 325 (65 Fe) mg tablet TAKE 1 TABLET BY MOUTH 2 TIMES A DAY WITH MEALS 60 tablet 5   • glucose blood (OneTouch Verio) test strip E11 65, Please substitute with appropriate alternative as covered by patient's insurance  100 strip 1   • metFORMIN (GLUCOPHAGE-XR) 500 mg 24 hr tablet Take 2 tablets (1,000 mg total) by mouth 2 (two) times a day with meals 360 tablet 1   • OneTouch Delica Lancets 25V MISC Use 2 (two) times a day E11 65, Please substitute with appropriate alternative as covered by patient's insurance   100 each 1   • [DISCONTINUED] glimepiride (AMARYL) 1 mg tablet Take 1 tablet (1 mg total) by mouth daily with breakfast 90 tablet 1   • famotidine (PEPCID) 20 mg tablet Take 1 tablet (20 mg total) by mouth daily as needed for heartburn (Patient not taking: No sig reported) 90 tablet 0   • ibuprofen (MOTRIN) 400 mg tablet Take 1 tablet (400 mg total) by mouth every 6 (six) hours as needed for mild pain for up to 3 days 12 tablet 0   • ondansetron (Zofran) 4 mg tablet Take 1 tablet (4 mg total) by mouth every 8 (eight) hours as needed for nausea or vomiting for up to 7 days 15 tablet 0   • zolpidem (AMBIEN) 5 mg tablet Take 1 tablet (5 mg total) by mouth daily at bedtime as needed for sleep 30 tablet 0     No current facility-administered medications on file prior to visit  Review of Systems   Constitutional: Positive for fatigue  Negative for fever  HENT: Negative for congestion, sore throat and trouble swallowing  Eyes: Negative for visual disturbance  Last eye exam 8/2022, reports normal, recommended scheduling another eye exam due to pregnancy  Respiratory: Negative for cough and shortness of breath  Cardiovascular: Negative for chest pain, palpitations and leg swelling  Gastrointestinal: Positive for constipation and nausea  Negative for vomiting  Endocrine: Positive for polydipsia  Negative for polyphagia and polyuria  Genitourinary: Negative for difficulty urinating and vaginal bleeding  Neurological: Positive for numbness (left hand and sometimetimes feet) and headaches  Psychiatric/Behavioral: Positive for sleep disturbance           Objective:        Component Value Date/Time    HGBA1C 11 2 (A) 08/23/2022 0823    HGBA1C 11 1 (A) 11/12/2021 0901    HGBA1C 6 8 (H) 02/02/2020 1131    HGBA1C 6 5 (H) 11/05/2019 0817    HGBA1C 5 7 (H) 10/31/2016 0657    HGBA1C 5 4 05/02/2016 0705      /74 (BP Location: Right arm, Patient Position: Sitting, Cuff Size: Standard)   Ht 5' 3" (1 6 m)   Wt (!) 144 kg (317 lb 9 6 oz)   LMP 08/29/2022   BMI 56 26 kg/m²          Physical Exam  Constitutional:       Appearance: She is obese  HENT:      Head: Normocephalic  Nose: Nose normal    Eyes:      Conjunctiva/sclera: Conjunctivae normal    Cardiovascular:      Rate and Rhythm: Normal rate and regular rhythm  Heart sounds: Normal heart sounds  Pulmonary:      Effort: Pulmonary effort is normal       Breath sounds: Normal breath sounds  Musculoskeletal:         General: Normal range of motion  Cervical back: Normal range of motion  Neurological:      Mental Status: She is alert and oriented to person, place, and time  Psychiatric:         Mood and Affect: Mood normal          Behavior: Behavior normal          Thought Content:  Thought content normal          Judgment: Judgment normal              Time in:8:30 AM  Time out:10:15 AM

## 2022-10-24 NOTE — PATIENT INSTRUCTIONS
-Last A1c 11 2%, goal is less than 6% with minimal hypoglycemia  -Repeat A1c, spot urine protein creatinine ratio    -Discontinue Glimepiride and Metformin    -Start Lantus 30 units at 9 PM daily    -Add 3 AM glucose check for next 3 days   -Be sure to have a bedtime snack with at least 15-30 grams of carbohydrates and 2 to 3 ounces of protein    -Send a message on Thursday, 10/27/2022   -Follow up with dietitian   -Keep 3 day food log to review with dietitian   -Start self monitoring blood glucose fasting and 2 hours after start of each meal  Keep glucose log  Glucose goals: fasting 60-90 mg/dL, 140 mg/dL or less 1 hour post meals, and 120 mg/dL or less 2 hours post meal; before meals/overnight   -Report glucose readings weekly via [employer] every Monday   -Start GDM 1800 calorie meal plan with 3 meals and 3 snacks including recommended combination of carb, protein and fat per meal/snack   -Please eat meal or snack every 2-3 5 hours while awake   -No more than 8 to 10 hours of fasting overnight   -Stay active if no restriction from your OB, walk up to 30 minutes a day  -Always have glucose available to treat hypoglycemia  Use 15:15 rule  Refer to hypoglycemia patient education sheet  Test blood sugar when experiencing signs and symptoms of hypoglycemia and prior to driving    -Start prenatal vitamin as recommended   -Continue iron supplement take with Vitamin C to increase absorption and take before a meal    -Add OTC D3 2000 iu capsule daily and take with dietary calcium    -Continue follow-up with your OB and MFM as recommended   -Stay in close contact with diabetes education team   -Insulin requirements during pregnancy; basal/bolus concept and Metformin discussed    -Very important to maintain tight glucose control during pregnancy to decrease risk factors including fetal macrosomia; birth injury; risk of ; polyhydramnios; pre-term labor; pre-eclampsia;  hypoglycemia; jaundice and stillbirth  -Diabetes and pregnancy booklet; diet plan and hypoglycemia patient education

## 2022-10-24 NOTE — ASSESSMENT & PLAN NOTE
-Last A1c 11 2%, goal is less than 6% with minimal hypoglycemia  -Repeat A1c, spot urine protein creatinine ratio    -Discontinue Glimepiride and Metformin    -Start Lantus 30 units at 9 PM daily    -Add 3 AM glucose check for next 3 days   -Be sure to have a bedtime snack with at least 15-30 grams of carbohydrates and 2 to 3 ounces of protein    -Send a message on Thursday, 10/27/2022   -Follow up with dietitian   -Keep 3 day food log to review with dietitian   -Start self monitoring blood glucose fasting and 2 hours after start of each meal  Keep glucose log  Glucose goals: fasting 60-90 mg/dL, 140 mg/dL or less 1 hour post meals, and 120 mg/dL or less 2 hours post meal; before meals/overnight   -Report glucose readings weekly via [employer] every Monday   -Start GDM 1800 calorie meal plan with 3 meals and 3 snacks including recommended combination of carb, protein and fat per meal/snack   -Please eat meal or snack every 2-3 5 hours while awake   -No more than 8 to 10 hours of fasting overnight   -Stay active if no restriction from your OB, walk up to 30 minutes a day  -Always have glucose available to treat hypoglycemia  Use 15:15 rule  Refer to hypoglycemia patient education sheet  Test blood sugar when experiencing signs and symptoms of hypoglycemia and prior to driving    -Start prenatal vitamin as recommended   -Continue iron supplement take with Vitamin C to increase absorption and take before a meal    -Add OTC D3 2000 iu capsule daily and take with dietary calcium    -Continue follow-up with your OB and MFM as recommended   -Stay in close contact with diabetes education team   -Insulin requirements during pregnancy; basal/bolus concept and Metformin discussed    -Very important to maintain tight glucose control during pregnancy to decrease risk factors including fetal macrosomia; birth injury; risk of ; polyhydramnios; pre-term labor; pre-eclampsia;  hypoglycemia; jaundice and stillbirth  -Diabetes and pregnancy booklet; diet plan and hypoglycemia patient education                  Lab Results   Component Value Date    HGBA1C 11 2 (A) 08/23/2022

## 2022-10-24 NOTE — ASSESSMENT & PLAN NOTE
-First visit weight 317 lbs  -Recommended weight gain 11 to 20 lbs  -Start GDM diet and follow up with dietitian

## 2022-10-31 DIAGNOSIS — E11.65 UNCONTROLLED TYPE 2 DIABETES MELLITUS WITH HYPERGLYCEMIA (HCC): ICD-10-CM

## 2022-10-31 RX ORDER — BLOOD SUGAR DIAGNOSTIC
STRIP MISCELLANEOUS
Qty: 100 STRIP | Refills: 0 | Status: SHIPPED | OUTPATIENT
Start: 2022-10-31 | End: 2022-10-31

## 2022-10-31 RX ORDER — LANCETS 33 GAUGE
EACH MISCELLANEOUS 2 TIMES DAILY
Qty: 100 EACH | Refills: 0 | Status: SHIPPED | OUTPATIENT
Start: 2022-10-31

## 2022-10-31 RX ORDER — BLOOD SUGAR DIAGNOSTIC
STRIP MISCELLANEOUS
Qty: 100 STRIP | Refills: 0 | Status: SHIPPED | OUTPATIENT
Start: 2022-10-31

## 2022-11-04 ENCOUNTER — OFFICE VISIT (OUTPATIENT)
Dept: PERINATAL CARE | Facility: CLINIC | Age: 22
End: 2022-11-04

## 2022-11-04 VITALS
WEIGHT: 293 LBS | BODY MASS INDEX: 51.91 KG/M2 | HEIGHT: 63 IN | DIASTOLIC BLOOD PRESSURE: 84 MMHG | SYSTOLIC BLOOD PRESSURE: 124 MMHG

## 2022-11-04 DIAGNOSIS — E66.01 MORBID OBESITY WITH BMI OF 50.0-59.9, ADULT (HCC): ICD-10-CM

## 2022-11-04 DIAGNOSIS — O24.111 PRE-EXISTING TYPE 2 DIABETES MELLITUS WITH HYPERGLYCEMIA DURING PREGNANCY IN FIRST TRIMESTER (HCC): Primary | ICD-10-CM

## 2022-11-04 DIAGNOSIS — E11.65 PRE-EXISTING TYPE 2 DIABETES MELLITUS WITH HYPERGLYCEMIA DURING PREGNANCY IN FIRST TRIMESTER (HCC): Primary | ICD-10-CM

## 2022-11-04 DIAGNOSIS — E78.1 HYPERTRIGLYCERIDEMIA: ICD-10-CM

## 2022-11-04 DIAGNOSIS — Z3A.08 8 WEEKS GESTATION OF PREGNANCY: ICD-10-CM

## 2022-11-04 NOTE — PROGRESS NOTES
Thank you for referring your patient to [facility] Diabetes in Pregnancy Program      Elona Gosselin is a  25 y o  female who presents today for Individual Class 2  Patient is at 8w0d gestation, Estimated Date of Delivery: 23  Reviewed and updated the following from patients medical record: PMH, Problem List, Allergies, and Current Medications  Visit Diagnosis:  Pre-existing type 2 DM with hyperglycemia in pregnancy    Additional Pregnancy Complications:  Obesity and Hyperlipidemia    Labs:    Lab Results   Component Value Date    GLUF 177 (H) 2021      10/24/22 [id number] 1% decreased from 11 2% on 22     Medications:  Lantus 36 units at 9 PM    Anthropometrics:  Ht Readings from Last 3 Encounters:   22 5' 3" (1 6 m)   10/24/22 5' 3" (1 6 m)   10/17/22 5' 3" (1 6 m)     Wt Readings from Last 3 Encounters:   22 (!) 144 kg (317 lb 6 4 oz)   10/24/22 (!) 144 kg (317 lb 9 6 oz)   10/17/22 (!) 144 kg (318 lb)     Pre-gravid weight: 317 5 pounds  Pre-gravid BMI: 56 26  Weight Change: Lost 0 1 pounds  Weight gain recommendations: BMI (> 30) 11-20 lbs  Comments: Patient needs to maintain her weight for the reminder of the pregnancy  Recent [employer] Results:  Date: first OB ultrasound in OB office Tuesday, 22  Next US date in MFM: to be scheduled    Blood Glucose Monitoring:  Reinforcement of blood glucose goals and reporting guidelines  Glucose Meter: OneTouch Verio Flex  Stoped wearing her DexCom due to excessive alarms  But willing to restart it in the future     Testing blood sugars: 4 x per day (Fasting, 2 hour after start of each meal)  Method of reporting blood sugars:Glucose Flowsheet & via [employer]    Report blood sugar results weekly via:  Phone: [phone]   OR  My Chart (Message with image attachment, or Glucose Flowsheet)    Goal Blood Sugar Ranges:   Fastin-90 mg/dL  1 hour after the start of each meal: 140 mg/dL or less  2 hours after start of each meal: 120 mg/dL or less      BG Log:  Review of blood glucose log  Discussed at Class 2        FBS is increased as skipping bedtime snack  Discussed many choices she could eat for her bedtime snak  Meal Plan:  Calories: 1800 calorie (CHO: 08-33-25-28-64-15) (PRO:2-1-3-1-3-2)    Diet Type: Low Carbohydrate  Additional Nutrition concerns: In the past, ate large portions till stuffed & drank 16 oz juice all day long  24 hr Diet Recall:  Provided at Class 2 today    Diet review: Diet recall indicates she has made lots of changes in her meal plan  No longer drinking juice in excess  Drinks water with lemon & if has juice, only adds a splash to her water  Has decreased portions minnie of her favorite ethnic foods & when dining out  Reports she dines out frequently, but is still eating somewhat larger than recommended portions minnie 6 oz protein    When makes homemade meals limits to 1 cup rice, but eating 2 pieces chicken  Understands to now limit to 3-4 oz protein  Reports she feels better & has more energy & is satisfied with the amount food eaten throughout the day  Is very enthused to continue feeling better & very interested in eating healthier now  Discussed how to read a food label in detail  Diet Instruction:  The patient was instructed on the followin  Individualized meal plan  2  Importance of consistent carbohydrate intake via 3 meals and 3 snacks per day   3  Importance of protein as it relates to blood glucose control  4  Encouraged  patient to eat every 2 0-3 5 hours while awake  5  Encouraged patient to go no longer than 8-10 hours fasting overnight until first meal of the day  6  Provided suggested meal/snack options to increase nutrition and maintain consistent meal and snack intakes  Physical Activity:  Discussed benefits of physical activity to optimize blood glucose control, encouraged activity at patient is physically able   Always consult a physician prior to starting an exercise program  Recommend 20-30 minutes daily  Is patient physically active? Yes very active job working at [email or web address] in [employer]  But not active on her days off  Agreed to walk on her days off  Sick day Guidelines:   Patient advised that sickness will raise blood sugar and need to continue medication regimen as directed  If blood sugar is > 160 mg/dL twice in one day call doctor  Instructed on what to do when unable to consume normal meal plan  Post-Partum Guidelines:  Advised to continue a similar diet to promote long-term health  Dining Out & Travel Guidelines:  Patient advised to be prepared with extra diabetes supplies, medications, and snacks, as well as sticking to the same time schedule and portions eaten at home for meals and snacks  Maternal-Fetal Testing:    Ultrasounds- growth scans every 4 weeks  NST- twice weekly starting at 32nd week GA   STACEY- weekly starting at 32 weeks GA    Patient Stated Goal: Pateint desires to control her BG to have a healthy baby  Goal Assessment: Pateint is working hard on attanining this goal; needs further improvement  Diabetes Self Management Support Plan outside of ongoing care: Spouse/Family    Learner/s Present:Learners Present: Patient   Barriers to Learning/Change: Cultural and Financial  Expected Compliance: good    Date to report blood sugars: Weekly  Follow up date: Scheduled with [address] [facility], [facility] Monday,  11/7/22   Nutrition follow-up scheduled for Wednesday, 11/16/22    Begin Time: 8:20 AM:  End Time: 9:25 AM    It was a pleasure working with them today  Please feel free to call with any questions or concerns      Johan Press  Diabetes Educator  [facility]  Diabetes in Pregnancy Program  [address] [place] [address],[address]  [address]

## 2022-11-07 ENCOUNTER — OFFICE VISIT (OUTPATIENT)
Dept: PERINATAL CARE | Facility: CLINIC | Age: 22
End: 2022-11-07

## 2022-11-07 VITALS
BODY MASS INDEX: 56.22 KG/M2 | DIASTOLIC BLOOD PRESSURE: 80 MMHG | HEART RATE: 92 BPM | SYSTOLIC BLOOD PRESSURE: 128 MMHG | HEIGHT: 63 IN

## 2022-11-07 DIAGNOSIS — E55.9 VITAMIN D DEFICIENCY: ICD-10-CM

## 2022-11-07 DIAGNOSIS — O24.111 PRE-EXISTING TYPE 2 DIABETES MELLITUS WITH HYPERGLYCEMIA DURING PREGNANCY IN FIRST TRIMESTER (HCC): Primary | ICD-10-CM

## 2022-11-07 DIAGNOSIS — E11.65 PRE-EXISTING TYPE 2 DIABETES MELLITUS WITH HYPERGLYCEMIA DURING PREGNANCY IN FIRST TRIMESTER (HCC): Primary | ICD-10-CM

## 2022-11-07 DIAGNOSIS — Z3A.08 8 WEEKS GESTATION OF PREGNANCY: ICD-10-CM

## 2022-11-07 DIAGNOSIS — E66.01 MORBID OBESITY WITH BMI OF 50.0-59.9, ADULT (HCC): ICD-10-CM

## 2022-11-07 DIAGNOSIS — R79.89 ELEVATED LFTS: ICD-10-CM

## 2022-11-07 DIAGNOSIS — O99.210 MATERNAL MORBID OBESITY, ANTEPARTUM (HCC): ICD-10-CM

## 2022-11-07 DIAGNOSIS — E66.01 MATERNAL MORBID OBESITY, ANTEPARTUM (HCC): ICD-10-CM

## 2022-11-07 RX ORDER — ASCORBATE CALCIUM 500 MG
TABLET ORAL DAILY
COMMUNITY

## 2022-11-07 RX ORDER — INSULIN ASPART 100 [IU]/ML
5 INJECTION, SOLUTION INTRAVENOUS; SUBCUTANEOUS
Qty: 15 ML | Refills: 0 | Status: SHIPPED | OUTPATIENT
Start: 2022-11-07

## 2022-11-07 NOTE — PROGRESS NOTES
Assessment/Plan:    Pre-existing type 2 diabetes mellitus with hyperglycemia during pregnancy in first trimester ([place] [address] )  -A1c 9 1% not at goal; aim for less than 6% with minimal hypoglycemia   -Baseline UPCR 0 07    -Last CMP with abnormal LFTs and glucose level  -Repeat A1c and CMP in December    -Due to FBS, increase Lantus to 44 units daily at 9 PM split dose into 2 (22 units each dose injected one after another into 2 different areas spaced 3 inches apart)  -Be sure to have your bedtime snack    -Due to 2 hours post meals above 120; start Novolog 5-5-5-0 before meals 1-2-3  Hold if not eating    -Continue eating every 2 to 3 5 hours during the day with recommended combination of carbohydrates and protein per meal/snack    -No more than 8 to 10 hours of fasting overnight   -Continue SMBG fasting; 2 hours post start of each meal and with hypoglycemia   -Continue walking 30 minutes a day if no restrictions from your OB    -Glucose goals fasting 60-90; 2 hours post meal 120 or less; before meals/overnight   -Report via flowsheet   -Always have glucose available for hypoglycemia  -Use 15 by 15 rule   -Schedule eye exam   -Follow up with OB as scheduled  -Follow up with MFM; fetal growth ultrasounds as recommended   -Schedule endocrinology appointment for end of June or early July 2023  Lab Results   Component Value Date    HGBA1C 9 1 (H) 10/24/2022       Maternal morbid obesity, antepartum ([place] [address] )  -First visit weight 317 lbs  -Current weight 317 lbs  -Recommended weight gain 11 to 20 lbs  -Continue GDM diet and walking 30 minutes a day  Vitamin D deficiency  -Continue Vitamin D3 2000 iu capsule and prenatal vitamin  Diagnoses and all orders for this visit:    Pre-existing type 2 diabetes mellitus with hyperglycemia during pregnancy in first trimester ([place] [address] )  -     insulin aspart (NovoLOG FlexPen) 100 UNIT/ML injection pen;  Inject 5 Units under the skin 3 (three) times a day before meals To be titrated  T2DM and pregnancy  -     Insulin Pen Needle 31G X 5 MM MISC; Inject under the skin daily at bedtime Use 5 a day or as directed  8 weeks gestation of pregnancy  -     insulin aspart (NovoLOG FlexPen) 100 UNIT/ML injection pen; Inject 5 Units under the skin 3 (three) times a day before meals To be titrated  T2DM and pregnancy  -     Insulin Pen Needle 31G X 5 MM MISC; Inject under the skin daily at bedtime Use 5 a day or as directed  Maternal morbid obesity, antepartum (HCC)  -     insulin aspart (NovoLOG FlexPen) 100 UNIT/ML injection pen; Inject 5 Units under the skin 3 (three) times a day before meals To be titrated  T2DM and pregnancy  -     Insulin Pen Needle 31G X 5 MM MISC; Inject under the skin daily at bedtime Use 5 a day or as directed  Morbid obesity with BMI of 50 0-59 9, adult (HCC)  -     insulin aspart (NovoLOG FlexPen) 100 UNIT/ML injection pen; Inject 5 Units under the skin 3 (three) times a day before meals To be titrated  T2DM and pregnancy  -     Insulin Pen Needle 31G X 5 MM MISC; Inject under the skin daily at bedtime Use 5 a day or as directed  Vitamin D deficiency  -     insulin aspart (NovoLOG FlexPen) 100 UNIT/ML injection pen; Inject 5 Units under the skin 3 (three) times a day before meals To be titrated  T2DM and pregnancy  -     Insulin Pen Needle 31G X 5 MM MISC; Inject under the skin daily at bedtime Use 5 a day or as directed  Elevated LFTs  -     insulin aspart (NovoLOG FlexPen) 100 UNIT/ML injection pen; Inject 5 Units under the skin 3 (three) times a day before meals To be titrated  T2DM and pregnancy  -     Insulin Pen Needle 31G X 5 MM MISC; Inject under the skin daily at bedtime Use 5 a day or as directed  Other orders  -     Prenatal Vit-Iron Carbonyl-FA (prenatal multivitamin) TABS; Take 1 tablet by mouth daily  -     Calcium Ascorbate (VITAMIN C) 500 mg tablet;  Take by mouth daily          Subjective:      Patient ID: Naya talavera 25 y o  female  8 3/7 weeks gestation T2DM on Lantus 36 units daily  Eating 3 meals and 3 snacks a day; sometimes falls asleep and misses bedtime snack; encouraged to avoid  Testing 4 times a day  Dexcom sample sensor provided during visit; pending insurance approval  Denies readings less than 60  Improvement in glucose readings noted  Has upcoming OB appointment  HPI    The following portions of the patient's history were reviewed and updated as appropriate: allergies, current medications, past family history, past medical history, past social history, past surgical history and problem list     No Known Allergies  Current Outpatient Medications on File Prior to Visit   Medication Sig Dispense Refill   • Blood Glucose Monitoring Suppl (OneTouch Verio Reflect) w/Device KIT Use 1 kit 2 (two) times a day E11 65, Please substitute with appropriate alternative as covered by patient's insurance  1 kit 0   • Calcium Ascorbate (VITAMIN C) 500 mg tablet Take by mouth daily     • Cholecalciferol (Vitamin D-3) 25 MCG (1000 UT) CAPS Take 2 capsules (2,000 Units total) by mouth daily Take with dietary calcium  60 capsule 1   • ferrous sulfate 325 (65 Fe) mg tablet TAKE 1 TABLET BY MOUTH 2 TIMES A DAY WITH MEALS 60 tablet 5   • glucose blood (OneTouch Verio) test strip E11 65 100 strip 0   • Insulin Glargine Solostar (Lantus SoloStar) 100 UNIT/ML SOPN Inject 0 3 mL (30 Units total) under the skin daily at bedtime At 9 PM daily  T2DM and pregnancy  (Patient taking differently: Inject 36 Units under the skin daily at bedtime At 9 PM daily  T2DM and pregnancy  ) 15 mL 0   • OneTouch Delica Lancets 29O MISC Use 2 (two) times a day E11 65, Please substitute with appropriate alternative as covered by patient's insurance   100 each 0   • Prenatal Vit-Iron Carbonyl-FA (prenatal multivitamin) TABS Take 1 tablet by mouth daily     • [DISCONTINUED] Insulin Pen Needle 31G X 5 MM MISC Inject under the skin daily at bedtime Use one a day or as directed  100 each 1   • Continuous Blood Gluc Sensor (Dexcom G6 Sensor) MISC 1 box=1 month supply or 3 sensors, use 1 sensor every 10 days  (Patient not taking: No sig reported) 1 each 12   • Continuous Blood Gluc Transmit (Dexcom G6 Transmitter) MISC Transmitter change every 90 days  (Patient not taking: No sig reported) 1 each 3   • metFORMIN (GLUCOPHAGE-XR) 500 mg 24 hr tablet Take 2 tablets (1,000 mg total) by mouth 2 (two) times a day with meals (Patient not taking: Reported on 11/7/2022) 360 tablet 1   • ondansetron (Zofran) 4 mg tablet Take 1 tablet (4 mg total) by mouth every 8 (eight) hours as needed for nausea or vomiting for up to 7 days 15 tablet 0   • [DISCONTINUED] famotidine (PEPCID) 20 mg tablet Take 1 tablet (20 mg total) by mouth daily as needed for heartburn (Patient not taking: No sig reported) 90 tablet 0   • [DISCONTINUED] ibuprofen (MOTRIN) 400 mg tablet Take 1 tablet (400 mg total) by mouth every 6 (six) hours as needed for mild pain for up to 3 days 12 tablet 0   • [DISCONTINUED] zolpidem (AMBIEN) 5 mg tablet Take 1 tablet (5 mg total) by mouth daily at bedtime as needed for sleep (Patient not taking: No sig reported) 30 tablet 0     No current facility-administered medications on file prior to visit  Review of Systems   Constitutional: Positive for fatigue  Negative for fever  HENT: Positive for congestion  Negative for trouble swallowing  Eyes: Negative for visual disturbance  Last eye exam 8/2022  Respiratory: Negative for cough and shortness of breath  Gastrointestinal: Positive for constipation  Negative for diarrhea, nausea and vomiting  Endocrine: Positive for polydipsia and polyuria  Negative for polyphagia  Genitourinary: Negative for difficulty urinating and vaginal bleeding  Musculoskeletal: Negative for back pain  Neurological: Positive for headaches  Negative for numbness  Psychiatric/Behavioral: Negative for sleep disturbance  Objective:  Refer to glucose flowsheet and attached file for Dexcom G6 report for 10 days  Improvement noted in glucose readings with starting basal insulin and dietary changes  BG  from 8% to 37% of the time; >140 from 90% to 62% of the time; average  to 152  Component Value Date/Time    HGBA1C 9 1 (H) 10/24/2022 1115    HGBA1C 11 2 (A) 08/23/2022 0823    HGBA1C 11 1 (A) 11/12/2021 0901    HGBA1C 6 8 (H) 02/02/2020 1131    HGBA1C 5 7 (H) 10/31/2016 0657    HGBA1C 5 4 05/02/2016 0705      /80 (BP Location: Left arm, Patient Position: Sitting, Cuff Size: Extra-Large)   Pulse 92   Ht 5' 3" (1 6 m)   LMP 08/29/2022   BMI 56 22 kg/m²          Physical Exam  Constitutional:       Appearance: She is obese  HENT:      Head: Normocephalic  Nose: Nose normal    Eyes:      Conjunctiva/sclera: Conjunctivae normal    Cardiovascular:      Rate and Rhythm: Normal rate  Heart sounds: Murmur (systolic) heard  Pulmonary:      Effort: Pulmonary effort is normal    Musculoskeletal:         General: Normal range of motion  Cervical back: Normal range of motion  Neurological:      Mental Status: She is alert and oriented to person, place, and time  Psychiatric:         Mood and Affect: Mood normal          Behavior: Behavior normal          Thought Content:  Thought content normal          Judgment: Judgment normal              Time in:8:27 AM  Time out:9:23 AM

## 2022-11-07 NOTE — ASSESSMENT & PLAN NOTE
-A1c 9 1% not at goal; aim for less than 6% with minimal hypoglycemia   -Baseline UPCR 0 07    -Last CMP with abnormal LFTs and glucose level  -Repeat A1c and CMP in December    -Due to FBS, increase Lantus to 44 units daily at 9 PM split dose into 2 (22 units each dose injected one after another into 2 different areas spaced 3 inches apart)  -Be sure to have your bedtime snack    -Due to 2 hours post meals above 120; start Novolog 5-5-5-0 before meals 1-2-3  Hold if not eating    -Continue eating every 2 to 3 5 hours during the day with recommended combination of carbohydrates and protein per meal/snack    -No more than 8 to 10 hours of fasting overnight   -Continue SMBG fasting; 2 hours post start of each meal and with hypoglycemia   -Continue walking 30 minutes a day if no restrictions from your OB    -Glucose goals fasting 60-90; 2 hours post meal 120 or less; before meals/overnight   -Report via flowsheet   -Always have glucose available for hypoglycemia  -Use 15 by 15 rule   -Schedule eye exam   -Follow up with OB as scheduled  -Follow up with MFM; fetal growth ultrasounds as recommended   -Schedule endocrinology appointment for end of June or early July 2023       Lab Results   Component Value Date    HGBA1C 9 1 (H) 10/24/2022

## 2022-11-07 NOTE — ASSESSMENT & PLAN NOTE
-First visit weight 317 lbs  -Current weight 317 lbs  -Recommended weight gain 11 to 20 lbs  -Continue GDM diet and walking 30 minutes a day

## 2022-11-07 NOTE — PATIENT INSTRUCTIONS
-A1c 9 1% not at goal; aim for less than 6% with minimal hypoglycemia   -Baseline UPCR 0 07    -Last CMP with abnormal LFTs and glucose level  -Repeat A1c and CMP in December    -Due to FBS, increase Lantus to 44 units daily at 9 PM split dose into 2 (22 units each dose injected one after another into 2 different areas spaced 3 inches apart)  -Be sure to have your bedtime snack    -Due to 2 hours post meals above 120; start Novolog 5-5-5-0 before meals 1-2-3  Hold if not eating    -Continue eating every 2 to 3 5 hours during the day with recommended combination of carbohydrates and protein per meal/snack    -No more than 8 to 10 hours of fasting overnight   -Continue SMBG fasting; 2 hours post start of each meal and with hypoglycemia   -Continue walking 30 minutes a day if no restrictions from your OB    -Glucose goals fasting 60-90; 2 hours post meal 120 or less; before meals/overnight   -Report via flowsheet   -Always have glucose available for hypoglycemia  -Use 15 by 15 rule   -Schedule eye exam   -Follow up with OB as scheduled  -Follow up with MFM; fetal growth ultrasounds as recommended   -Schedule endocrinology appointment for end of June or early July 2023

## 2022-11-08 ENCOUNTER — OFFICE VISIT (OUTPATIENT)
Dept: OBGYN CLINIC | Facility: CLINIC | Age: 22
End: 2022-11-08

## 2022-11-08 VITALS
DIASTOLIC BLOOD PRESSURE: 70 MMHG | WEIGHT: 293 LBS | HEIGHT: 63 IN | BODY MASS INDEX: 51.91 KG/M2 | SYSTOLIC BLOOD PRESSURE: 120 MMHG

## 2022-11-08 DIAGNOSIS — E11.65 UNCONTROLLED TYPE 2 DIABETES MELLITUS WITH HYPERGLYCEMIA (HCC): ICD-10-CM

## 2022-11-08 DIAGNOSIS — B37.31 VAGINA, CANDIDIASIS: ICD-10-CM

## 2022-11-08 DIAGNOSIS — Z3A.08 8 WEEKS GESTATION OF PREGNANCY: Primary | ICD-10-CM

## 2022-11-08 RX ORDER — FLUCONAZOLE 150 MG/1
150 TABLET ORAL ONCE
Qty: 1 TABLET | Refills: 0 | Status: SHIPPED | OUTPATIENT
Start: 2022-11-08 | End: 2022-11-08

## 2022-11-10 NOTE — PROGRESS NOTES
Assessment/Plan:  - Viable IUP @ 8w 4d EGA  - CALI 2023  - Continue PNV  - Recommend COVID and flu vaccine  - Patient to call for concerns  - RTO 3 weeks for OB intake          Diagnoses and all orders for this visit:    8 weeks gestation of pregnancy  -     Ambulatory Referral to Maternal Fetal Medicine; Future    Vagina, candidiasis  -     fluconazole (DIFLUCAN) 150 mg tablet; Take 1 tablet (150 mg total) by mouth once for 1 dose  -     fluconazole (DIFLUCAN) 150 mg tablet; Take 1 tablet (150 mg total) by mouth once for 1 dose    Uncontrolled type 2 diabetes mellitus with hyperglycemia ([facility] [address] )  -     Ambulatory Referral to Maternal Fetal Medicine; Future               Subjective:       Patient ID: Kyle Peng 2000        Kyle Peng is a 25 y o  [place] presenting to the office for pregnancy confirmation  Patient's last menstrual period was 2022  , placing her at 100 [place],9D today with CALI of 2023  She is feeling  occ nausea  She is working with DM ed to improve her BS        OB History    Para Term  AB Living   1 0 0 0 0 0   SAB IAB Ectopic Multiple Live Births   0 0 0 0 0      # Outcome Date GA Lbr Salvador/2nd Weight Sex Delivery Anes PTL Lv   1 Current                  The following portions of the patient's history were reviewed and updated as appropriate: allergies, current medications, past family history, past medical history, past social history, past surgical history and problem list     Allergies:  Patient has no known allergies      Medications:    Current Outpatient Medications:   •  Blood Glucose Monitoring Suppl (OneTouch Verio Reflect) w/Device KIT, Use 1 kit 2 (two) times a day E11 65, Please substitute with appropriate alternative as covered by patient's insurance , Disp: 1 kit, Rfl: 0  •  Calcium Ascorbate (VITAMIN C) 500 mg tablet, Take by mouth daily, Disp: , Rfl:   •  Cholecalciferol (Vitamin D-3) 25 MCG (1000 UT) CAPS, Take 2 capsules (2,000 Units total) by mouth daily Take with dietary calcium  , Disp: 60 capsule, Rfl: 1  •  ferrous sulfate 325 (65 Fe) mg tablet, TAKE 1 TABLET BY MOUTH 2 TIMES A DAY WITH MEALS, Disp: 60 tablet, Rfl: 5  •  glucose blood (OneTouch Verio) test strip, E11 65, Disp: 100 strip, Rfl: 0  •  insulin aspart (NovoLOG FlexPen) 100 UNIT/ML injection pen, Inject 5 Units under the skin 3 (three) times a day before meals To be titrated  T2DM and pregnancy  , Disp: 15 mL, Rfl: 0  •  Insulin Pen Needle 31G X 5 MM MISC, Inject under the skin daily at bedtime Use 5 a day or as directed , Disp: 100 each, Rfl: 1  •  OneTouch Delica Lancets 21N MISC, Use 2 (two) times a day E11 65, Please substitute with appropriate alternative as covered by patient's insurance , Disp: 100 each, Rfl: 0  •  Prenatal Vit-Iron Carbonyl-FA (prenatal multivitamin) TABS, Take 1 tablet by mouth daily, Disp: , Rfl:   •  Insulin Glargine Solostar (Lantus SoloStar) 100 UNIT/ML SOPN, Inject 0 3 mL (30 Units total) under the skin daily at bedtime At 9 PM daily  T2DM and pregnancy  (Patient taking differently: Inject 36 Units under the skin daily at bedtime At 9 PM daily  T2DM and pregnancy ), Disp: 15 mL, Rfl: 0  •  ondansetron (Zofran) 4 mg tablet, Take 1 tablet (4 mg total) by mouth every 8 (eight) hours as needed for nausea or vomiting for up to 7 days, Disp: 15 tablet, Rfl: 0      Review of Systems:   Review of Systems       Objective:       Visit Vitals  /70 (BP Location: Right arm, Patient Position: Sitting, Cuff Size: Standard)   Ht 5' 3" (1 6 m)   Wt (!) 144 kg (318 lb 3 2 oz)   LMP 08/29/2022   BMI 56 37 kg/m²   OB Status Pregnant   Smoking Status Never Smoker   BSA 2 35 m²        GEN: The patient was alert and oriented x3, pleasant well-appearing female in no acute distress     CV: Regular rate  PULM: nonlabored respirations  MSK: Normal gait  : WNL  Skin: warm, dry  Neuro: no focal deficits  Psych: normal affect and judgement, cooperative    Ultrasound:     Viability US     Gestational sac: present               Location: uterine  Yolk sac: present  Fetal pole: present               CRL: 2 2 cm = 8w6d  Cardiac activity: present               Rate: 175 bpm     Ovaries: normal appearing bilaterally  Cul de sac: absence of free fluid  Uterus: normal in appearance           Ultrasound Probe Disinfection    A transvaginal ultrasound was performed     Prior to use, disinfection was performed with High Level Disinfection Process (Trophon)  Probe serial number [id number]: [id number] was used    Giacomo Hilliard MD  11/09/22  11:02 PM

## 2022-11-21 ENCOUNTER — TELEPHONE (OUTPATIENT)
Dept: OBGYN CLINIC | Facility: CLINIC | Age: 22
End: 2022-11-21

## 2022-11-21 ENCOUNTER — INITIAL PRENATAL (OUTPATIENT)
Dept: OBGYN CLINIC | Facility: CLINIC | Age: 22
End: 2022-11-21

## 2022-11-21 VITALS
HEIGHT: 63 IN | DIASTOLIC BLOOD PRESSURE: 68 MMHG | WEIGHT: 293 LBS | SYSTOLIC BLOOD PRESSURE: 124 MMHG | BODY MASS INDEX: 51.91 KG/M2

## 2022-11-21 DIAGNOSIS — O99.211 OBESITY AFFECTING PREGNANCY IN FIRST TRIMESTER: Primary | ICD-10-CM

## 2022-11-21 DIAGNOSIS — Z13.71 TESTING OF FEMALE FOR GENETIC DISEASE CARRIER STATUS: Primary | ICD-10-CM

## 2022-11-21 DIAGNOSIS — Z3A.10 10 WEEKS GESTATION OF PREGNANCY: ICD-10-CM

## 2022-11-21 NOTE — PROGRESS NOTES
OB INTAKE INTERVIEW  Pt presents for OB intake  Plan:  - Prenatal labs ordered  - Referral given for MFM   -NT scheduled for  and Level 2   - Reviewed Genetic testing options   -Interested in SMA and CF  - Patient to call for concerns  - RTO 4 weeks for OB F/U visit and PAP/Cultures   -Hx of Type 2 Diabetes- Patient already had visits with diabetes educators and has future apts scheduled         OB History    Para Term  AB Living   1 0 0 0 0 0   SAB IAB Ectopic Multiple Live Births   0 0 0 0 0      # Outcome Date GA Lbr Salvador/2nd Weight Sex Delivery Anes PTL Lv   1 Current              Hx of  delivery prior to 36 weeks 6 days: No  Last Menstrual Period:    Patient's last menstrual period was 2022  Ultrasound date: 22 8 weeks 4 days     Estimated Date of Delivery: 23       Current Issues:  Constipation :   Yes, discussed hydration, high fiber diet and safe medication   Headaches :   Yes,discussed hydration, rest and tylenol   Cramping:  No  Spotting :   No      Interview education  • [employer] Pregnancy Essentials reviewed and discussed   • Baby and Me [facility] Handout  • [facility] MFM Handout  • Discussed genetic testing  • Prenatal lab work: Scripts printed and given to pt  • Influenza vaccine given today: No  • Discussed Tdap vaccine     Immunizations:   Immunization History   Administered Date(s) Administered   • COVID-19 PFIZER VACCINE 0 3 ML IM 2021, 2021   • DTP 2000, 2000, 2000, 2001, 2005   • H1N1, All Formulations 10/27/2009   • HPV Quadrivalent 2011, 2011, 2012   • Hep A, ped/adol, 2 dose 2017   • Hep B, Adolescent or Pediatric 2000, 2000, 2001   • Hep B, adult 2000, 2000, 2001   • Hepatitis A 2017   • HiB 2000, 2000, 2000, 10/29/2001, 10/29/2011   • Hib (PRP-OMP) 2000, 2000, 2000, 10/29/2011   • INFLUENZA 11/17/2011, 11/19/2012, 11/26/2013   • IPV 2000, 2000, 05/23/2001, 03/23/2005   • Influenza Quadrivalent Preservative Free 3 years and older IM 01/04/2016   • Influenza, recombinant, quadrivalent,injectable, preservative free 11/07/2019   • Influenza, seasonal, injectable 11/17/2011, 11/19/2012, 11/26/2013, 11/18/2014   • MMR 05/31/2001, 03/23/2005   • Meningococcal B, OMV (Aiyana Angelo) 04/26/2019   • Meningococcal MCV4P 11/17/2011, 07/26/2017   • Meningococcal, Unknown Serogroups 11/17/2011, 07/26/2017   • Pneumococcal Conjugate PCV 7 2000, 2000, 2000, 05/31/2001   • Pneumococcal Polysaccharide PPV23 02/04/2020   • Tdap 2000, 11/07/2003, 11/17/2011   • Tuberculin Skin Test-PPD Intradermal 11/07/2003, 02/24/2021   • Varicella 10/29/2001, 08/21/2008       Diabetes              Pregestational DM: Yes                hx of GDM: No              BMI >35: Yes              first degree relative with type 2 diabetes: Yes              hx of PCOS: Yes              current metformin use:   No              prior hx of LGA/macrosomia: No                Hypertension              Hx of chronic HTN: No              hx of gestational HTN: No              hx of preeclampsia, eclampsia, or HELLP syndrome: No              Family h/o preeclampsia: No              Age 28 or older: No              Multifetal gestation: No  Type 1 or Type 2 DM: No  Renal Disease: No  Autoimmune disease (systemic lupus erythematosus, antiphospholipid antibody syndrome): No  Nulliparity: Yes  Obesity (BMI over 30): Yes  More than 10 year pregnancy interval: No  Previous IUGR, low birthweight or small for gestational age: No       Immunizations:              influenza vaccine: Undecided               discussed Tdap vaccine administration at 27-28 weeks   Covid Vaccination: Has two vaccines, discussed booster recommendation      Dental visit with last 6 months - No  PHQ-2/9 score: 0       MyChart activated (not 13-18 years of age)?: Yes    The patient was oriented to our practice and all questions were answered    Interviewed by: Jan Madera RN 11/21/22

## 2022-11-21 NOTE — TELEPHONE ENCOUNTER
Called pts insurance to see if pt needs prior auth for genetic testing blood work    Using codes 56268 (CF) and 34578 (SMA)               NO PA REQUIRED

## 2022-11-21 NOTE — TELEPHONE ENCOUNTER
----- Message from Bina Ronquillo RN sent at 11/21/2022 10:14 AM EST -----  Patient was here for an intake this morning  She is interested in SMA and CF screening      Thanks!

## 2022-11-24 ENCOUNTER — NURSE TRIAGE (OUTPATIENT)
Dept: OTHER | Facility: OTHER | Age: 22
End: 2022-11-24

## 2022-11-24 ENCOUNTER — HOSPITAL ENCOUNTER (EMERGENCY)
Facility: HOSPITAL | Age: 22
Discharge: HOME | End: 2022-11-25
Attending: EMERGENCY MEDICINE

## 2022-11-24 VITALS
RESPIRATION RATE: 20 BRPM | OXYGEN SATURATION: 98 % | HEART RATE: 95 BPM | TEMPERATURE: 99 F | SYSTOLIC BLOOD PRESSURE: 177 MMHG | DIASTOLIC BLOOD PRESSURE: 83 MMHG

## 2022-11-24 DIAGNOSIS — O46.90 VAGINAL BLEEDING DURING PREGNANCY: Primary | ICD-10-CM

## 2022-11-24 NOTE — TELEPHONE ENCOUNTER
Regarding: 10 wks pregnant/ cramping/ spotting  ----- Message from Vikki Alves sent at 11/24/2022  9:37 AM EST -----  " I'm 10 wks and I have been cramping since yesterday and today I started spotting so I'm not sure if I should go to the ER or not "

## 2022-11-24 NOTE — TELEPHONE ENCOUNTER
Tiger connect message sent to the on call provider regarding patient's symptoms  Per the on call provider, patient was advised to take tylenol as needed and to refrain from intercourse until next appointment  Patient was advised to call back if she is saturating pads  Reason for Disposition  • [1] Intermittent lower abdominal pain (e g , cramping) AND [2] present > 24 hours    Answer Assessment - Initial Assessment Questions  1  ONSET: "When did this bleeding start?"        *No Answer*  2  DESCRIPTION: "Describe the bleeding that you are having " "How much bleeding is there?"     - SPOTTING: spotting, or pinkish / brownish mucous discharge; does not fill panti-liner or pad     - MILD:  less than 1 pad / hour; less than patient's usual menstrual bleeding    - MODERATE: 1-2 pads / hour; 1 menstrual cup every 6 hours; small-medium blood clots (e g , pea, grape, small coin)    - SEVERE: soaking 2 or more pads/hour for 2 or more hours; 1 menstrual cup every 2 hours; bleeding not contained by pads or continuous red blood from vagina; large blood clots (e g , golf ball, large coin)       *No Answer*  3  ABDOMINAL PAIN SEVERITY: If present, ask: "How bad is it?"  (e g , Scale 1-10; mild, moderate, or severe)    - MILD (1-3): doesn't interfere with normal activities, abdomen soft and not tender to touch     - MODERATE (4-7): interferes with normal activities or awakens from sleep, tender to touch     - SEVERE (8-10): excruciating pain, doubled over, unable to do any normal activities      *No Answer*  4  PREGNANCY: "Do you know how many weeks or months pregnant you are?" "When was the first day of your last normal menstrual period?"      *No Answer*  5   HEMODYNAMIC STATUS: "Are you weak or feeling lightheaded?" If Yes, ask: "Can you stand and walk normally?"       *No Answer*  6  OTHER SYMPTOMS: "What other symptoms are you having with the bleeding?" (e g , passed tissue, vaginal discharge, fever, menstrual-type cramps)      *No Answer*    1  ONSET: "When did this bleeding start?"         Yesterday   2  DESCRIPTION: "Describe the bleeding that you are having " "How much bleeding is there?"     - SPOTTING: spotting, or pinkish / brownish mucous discharge; does not fill panti-liner or pad     - MILD:  less than 1 pad / hour; less than patient's usual menstrual bleeding    - MODERATE: 1-2 pads / hour; 1 menstrual cup every 6 hours; small-medium blood clots (e g , pea, grape, small coin)    - SEVERE: soaking 2 or more pads/hour for 2 or more hours; 1 menstrual cup every 2 hours; bleeding not contained by pads or continuous red blood from vagina; large blood clots (e g , golf ball, large coin)       Spotting "Yesterday I noticed a little  Today I noticed it when I wiped in the morning  It is pinkish "   3  ABDOMINAL PAIN SEVERITY: If present, ask: "How bad is it?"  (e g , Scale 1-10; mild, moderate, or severe)    - MILD (1-3): doesn't interfere with normal activities, abdomen soft and not tender to touch     - MODERATE (4-7): interferes with normal activities or awakens from sleep, tender to touch     - SEVERE (8-10): excruciating pain, doubled over, unable to do any normal activities      4/10 Abdominal cramping "That has been going on for the past 2-3 days  It is uncomfortable "   4  PREGNANCY: "Do you know how many weeks or months- pregnant you are?"       10 weeks   5  CALI: "What date are you expecting to deliver?"      6/16/23   6  FETAL MOVEMENT: "Has the baby's movement decreased or changed significantly from normal?"      N/A   7  HEMODYNAMIC STATUS: "Are you weak or feeling lightheaded?" If Yes, ask: "Can you stand and walk normally?"       Denies   8  OTHER SYMPTOMS: "What other symptoms are you having with the bleeding?" (e g , leaking fluid from vagina, contractions)      Denies    Patient had intercourse on Tuesday night      Protocols used: PREGNANCY - VAGINAL BLEEDING LESS THAN 20 WEEKS [facility]

## 2022-11-24 NOTE — Clinical Note
David Pappas was seen and treated in our emergency department on 11/24/2022  No restrictions            Diagnosis:     Toyin Barajas    She may return on this date: If you have any questions or concerns, please don't hesitate to call        Rigo Ryan, DO    ______________________________           _______________          _______________  Hospital Representative                              Date                                Time

## 2022-11-25 ENCOUNTER — ROUTINE PRENATAL (OUTPATIENT)
Dept: OBGYN CLINIC | Facility: CLINIC | Age: 22
End: 2022-11-25

## 2022-11-25 VITALS — DIASTOLIC BLOOD PRESSURE: 76 MMHG | WEIGHT: 293 LBS | BODY MASS INDEX: 55.62 KG/M2 | SYSTOLIC BLOOD PRESSURE: 126 MMHG

## 2022-11-25 DIAGNOSIS — O02.1 MISSED ABORTION: Primary | ICD-10-CM

## 2022-11-25 LAB
ABO GROUP BLD: NORMAL
ANION GAP SERPL CALCULATED.3IONS-SCNC: 6 MMOL/L (ref 4–13)
B-HCG SERPL-ACNC: ABNORMAL MIU/ML (ref 0–11.6)
BASOPHILS # BLD AUTO: 0.04 THOUSANDS/ÂΜL (ref 0–0.1)
BASOPHILS NFR BLD AUTO: 0 % (ref 0–1)
BLD GP AB SCN SERPL QL: NEGATIVE
BUN SERPL-MCNC: 8 MG/DL (ref 5–25)
CALCIUM SERPL-MCNC: 9 MG/DL (ref 8.4–10.2)
CHLORIDE SERPL-SCNC: 104 MMOL/L (ref 96–108)
CO2 SERPL-SCNC: 25 MMOL/L (ref 21–32)
CREAT SERPL-MCNC: 0.51 MG/DL (ref 0.6–1.3)
EOSINOPHIL # BLD AUTO: 0.19 THOUSAND/ÂΜL (ref 0–0.61)
EOSINOPHIL NFR BLD AUTO: 2 % (ref 0–6)
ERYTHROCYTE [DISTWIDTH] IN BLOOD BY AUTOMATED COUNT: 18.7 % (ref 11.6–15.1)
GFR SERPL CREATININE-BSD FRML MDRD: 136 ML/MIN/1.73SQ M
GLUCOSE SERPL-MCNC: 133 MG/DL (ref 65–140)
HCT VFR BLD AUTO: 33 % (ref 34.8–46.1)
HGB BLD-MCNC: 10 G/DL (ref 11.5–15.4)
IMM GRANULOCYTES # BLD AUTO: 0.04 THOUSAND/UL (ref 0–0.2)
IMM GRANULOCYTES NFR BLD AUTO: 0 % (ref 0–2)
LYMPHOCYTES # BLD AUTO: 3.77 THOUSANDS/ÂΜL (ref 0.6–4.47)
LYMPHOCYTES NFR BLD AUTO: 37 % (ref 14–44)
MCH RBC QN AUTO: 22 PG (ref 26.8–34.3)
MCHC RBC AUTO-ENTMCNC: 30.3 G/DL (ref 31.4–37.4)
MCV RBC AUTO: 73 FL (ref 82–98)
MONOCYTES # BLD AUTO: 0.48 THOUSAND/ÂΜL (ref 0.17–1.22)
MONOCYTES NFR BLD AUTO: 5 % (ref 4–12)
NEUTROPHILS # BLD AUTO: 5.81 THOUSANDS/ÂΜL (ref 1.85–7.62)
NEUTS SEG NFR BLD AUTO: 56 % (ref 43–75)
NRBC BLD AUTO-RTO: 0 /100 WBCS
PLATELET # BLD AUTO: 245 THOUSANDS/UL (ref 149–390)
PMV BLD AUTO: 11.8 FL (ref 8.9–12.7)
POTASSIUM SERPL-SCNC: 3.7 MMOL/L (ref 3.5–5.3)
RBC # BLD AUTO: 4.54 MILLION/UL (ref 3.81–5.12)
RH BLD: NEGATIVE
SODIUM SERPL-SCNC: 135 MMOL/L (ref 135–147)
SPECIMEN EXPIRATION DATE: NORMAL
WBC # BLD AUTO: 10.33 THOUSAND/UL (ref 4.31–10.16)

## 2022-11-25 RX ADMIN — HUMAN RHO(D) IMMUNE GLOBULIN 300 MCG: 300 INJECTION, SOLUTION INTRAMUSCULAR at 02:44

## 2022-11-25 NOTE — TELEPHONE ENCOUNTER
Reason for Disposition  • Patient already left for the hospital/clinic  Patient is already at [facility]      Protocols used: NO CONTACT OR DUPLICATE CONTACT CALL-ADULT-

## 2022-11-25 NOTE — TELEPHONE ENCOUNTER
Patient is 10w6d pregnant and has been having vaginal bleeding and cramping since last night that is worsening (now filling a pad)  Upon contacting pt, she was already at [facility]  TC sent to on-call provider to inform

## 2022-11-25 NOTE — PROGRESS NOTES
Pt is here for a pregnancy ultrasound  She began bleeding yesterday  PT did have Rhogam yesterday because of the bleeding

## 2022-11-25 NOTE — ED PROVIDER NOTES
History  Chief Complaint   Patient presents with   • Vaginal Bleeding - Pregnant     Pt presents to the ED with vaginal bleeding onset x 1 day  Starting spotting yesterday and increasing throughout the day today  G1     Patient is a 20-year-old female history of type 2 insulin-dependent diabetes mellitus who is currently 10 weeks 6 days pregnant presenting to the emergency department for evaluation of vaginal bleeding  Patient states that she had an ultrasound confirmation of intrauterine pregnancy approximately 2 weeks ago  This is confirmed by chart review  It yesterday patient began having mild spotting with wiping after using the restroom  Yet today her bleeding has intensified and she has gone through 1 pad an total over last 24 hours  She is passing small clots she has noted  She denies any further symptoms including lightheadedness, dizziness, chest pain  She is currently endorsing abdominal cramping  Prior to Admission Medications   Prescriptions Last Dose Informant Patient Reported? Taking? Blood Glucose Monitoring Suppl (OneTouch Verio Reflect) w/Device KIT   No No   Sig: Use 1 kit 2 (two) times a day E11 65, Please substitute with appropriate alternative as covered by patient's insurance  Calcium Ascorbate (VITAMIN C) 500 mg tablet   Yes No   Sig: Take by mouth daily   Cholecalciferol (Vitamin D-3) 25 MCG (1000 UT) CAPS   No No   Sig: Take 2 capsules (2,000 Units total) by mouth daily Take with dietary calcium  Insulin Glargine Solostar (Lantus SoloStar) 100 UNIT/ML SOPN   No No   Sig: Inject 0 3 mL (30 Units total) under the skin daily at bedtime At 9 PM daily  T2DM and pregnancy  Patient taking differently: Inject 36 Units under the skin daily at bedtime At 9 PM daily  T2DM and pregnancy  Insulin Pen Needle 31G X 5 MM MISC   No No   Sig: Inject under the skin daily at bedtime Use 5 a day or as directed     OneTouch Delica Lancets 93P MISC   No No   Sig: Use 2 (two) times a day E11 65, Please substitute with appropriate alternative as covered by patient's insurance  Prenatal Vit-Iron Carbonyl-FA (prenatal multivitamin) TABS   Yes No   Sig: Take 1 tablet by mouth daily   ferrous sulfate 325 (65 Fe) mg tablet   No No   Sig: TAKE 1 TABLET BY MOUTH 2 TIMES A DAY WITH MEALS   Patient not taking: Reported on 11/21/2022   glucose blood (OneTouch Verio) test strip   No No   Sig: E11 65   insulin aspart (NovoLOG FlexPen) 100 UNIT/ML injection pen   No No   Sig: Inject 5 Units under the skin 3 (three) times a day before meals To be titrated  T2DM and pregnancy     ondansetron (Zofran) 4 mg tablet   No No   Sig: Take 1 tablet (4 mg total) by mouth every 8 (eight) hours as needed for nausea or vomiting for up to 7 days      Facility-Administered Medications: None       Past Medical History:   Diagnosis Date   • Anxiety    • Asthma     childhood   • Chronic headache    • Current moderate episode of major depressive disorder ([facility] ) 09/29/2015   • Dextroscoliosis 04/05/2016   • Diabetes mellitus ([facility] )     2019   • Elevated LFTs    • Migraine without aura and without status migrainosus, not intractable 08/16/2019   • PCOS (polycystic ovarian syndrome)    • Plantar fasciitis 06/18/2020   • Pre-diabetes    • Self-injurious behavior     when 15 y/o   • Varicella     vaccinated       Past Surgical History:   Procedure Laterality Date   • BREAST CYST INCISION AND DRAINAGE Right 07/25/2017    Procedure: INCISION AND DRAINAGE (I&D) BREAST;  Surgeon: Damaso Bhakta DO;  Location: [facility];  Service: General   • TONSILLECTOMY  2016       Family History   Problem Relation Age of Onset   • Hypertension Mother    • Hyperlipidemia Mother    • Diabetes Mother    • Depression Mother    • Thyroid disease Father    • Thyroid disease Sister    • Anemia Sister    • Depression Sister    • Anxiety disorder Sister    • Anemia Sister    • Anemia Sister    • Kidney disease Sister    • Appendicitis Maternal Grandmother    • Aneurysm Paternal Grandfather    • Prostate cancer Paternal Uncle    • Obesity Family    • Allergies Family    • Diabetes Family    • Substance Abuse Neg Hx    • Mental illness Neg Hx    • Stroke Neg Hx      I have reviewed and agree with the history as documented  E-Cigarette/Vaping   • E-Cigarette Use Never User      E-Cigarette/Vaping Substances   • Nicotine No    • THC No    • CBD No    • Flavoring No    • Other No    • Unknown No      Social History     Tobacco Use   • Smoking status: Never   • Smokeless tobacco: Never   Vaping Use   • Vaping Use: Never used   Substance Use Topics   • Alcohol use: Not Currently     Comment: OCCASIONAL/SOCIAL   • Drug use: Not Currently     Types: Marijuana        Review of Systems   Constitutional: Negative  HENT: Negative  Eyes: Negative  Respiratory: Negative  Cardiovascular: Negative  Gastrointestinal: Negative  Abdominal cramping   Endocrine: Negative  Genitourinary: Positive for vaginal discharge  Musculoskeletal: Negative  Skin: Negative  Allergic/Immunologic: Negative  Neurological: Negative  Hematological: Negative  Psychiatric/Behavioral: Negative  All other systems reviewed and are negative  Physical Exam  ED Triage Vitals [11/24/22 2331]   Temperature Pulse Respirations Blood Pressure SpO2   99 °F (37 2 °C) 95 20 (!) 177/83 98 %      Temp Source Heart Rate Source Patient Position - Orthostatic VS BP Location FiO2 (%)   Oral Monitor Sitting Right arm --      Pain Score       --             Orthostatic Vital Signs  Vitals:    11/24/22 2331   BP: (!) 177/83   Pulse: 95   Patient Position - Orthostatic VS: Sitting       Physical Exam  Vitals and nursing note reviewed  Constitutional:       General: She is not in acute distress  Appearance: Normal appearance  She is not ill-appearing, toxic-appearing or diaphoretic  HENT:      Head: Normocephalic and atraumatic  Eyes:      General: No scleral icterus  Right eye: No discharge  Left eye: No discharge  Extraocular Movements: Extraocular movements intact  Conjunctiva/sclera: Conjunctivae normal       Pupils: Pupils are equal, round, and reactive to light  Cardiovascular:      Rate and Rhythm: Normal rate  Pulses: Normal pulses  Heart sounds: Normal heart sounds  No murmur heard  No friction rub  No gallop  Pulmonary:      Effort: Pulmonary effort is normal  No respiratory distress  Breath sounds: Normal breath sounds  No stridor  No wheezing, rhonchi or rales  Abdominal:      General: Abdomen is flat  Bowel sounds are normal  There is no distension  Palpations: Abdomen is soft  Tenderness: There is no abdominal tenderness  There is no guarding or rebound  Genitourinary:     Vagina: Normal       Cervix: Cervical bleeding present  Musculoskeletal:         General: No swelling  Normal range of motion  Cervical back: Normal range of motion  No rigidity  Right lower leg: No edema  Left lower leg: No edema  Skin:     General: Skin is warm and dry  Capillary Refill: Capillary refill takes less than 2 seconds  Coloration: Skin is not jaundiced  Findings: No bruising or lesion  Neurological:      General: No focal deficit present  Mental Status: She is alert and oriented to person, place, and time  Mental status is at baseline  Psychiatric:         Mood and Affect: Mood normal          Behavior: Behavior normal          Thought Content:  Thought content normal          Judgment: Judgment normal          ED Medications  Medications   Rho(D) immune globulin (RHOGAM ULTRA-FILTERED PLUS) IM injection 300 mcg (300 mcg Intramuscular Given 11/25/22 0244)       Diagnostic Studies  Results Reviewed     Procedure Component Value Units Date/Time    hCG, quantitative [[id number]]  (Abnormal) Collected: 11/25/22 0024    Lab Status: Final result Specimen: Blood from Arm, Left Updated: 11/25/22 0128     HCG, Quant 10,912 mIU/mL     Narrative:       Expected Ranges:     Approximate               Approximate HCG  Gestation age          Concentration ( mIU/mL)  _____________          ______________________   Robel Negron                      HCG values  0 2-1                       5-50  1-2                           2-3                         100-5000  3-4                         500-64563  4-5                         1000-83945  5-6                         [id number]  6-8                         [id number]  8-12                        [id number]      Basic metabolic panel [[id number]]  (Abnormal) Collected: 11/25/22 0024    Lab Status: Final result Specimen: Blood from Arm, Left Updated: 11/25/22 0053     Sodium 135 mmol/L      Potassium 3 7 mmol/L      Chloride 104 mmol/L      CO2 25 mmol/L      ANION GAP 6 mmol/L      BUN 8 mg/dL      Creatinine 0 51 mg/dL      Glucose 133 mg/dL      Calcium 9 0 mg/dL      eGFR 136 ml/min/1 73sq m     Narrative:      [facility] guidelines for Chronic Kidney Disease (CKD):   •  Stage 1 with normal or high GFR (GFR > 90 mL/min/1 73 square meters)  •  Stage 2 Mild CKD (GFR = 60-89 mL/min/1 73 square meters)  •  Stage 3A Moderate CKD (GFR = 45-59 mL/min/1 73 square meters)  •  Stage 3B Moderate CKD (GFR = 30-44 mL/min/1 73 square meters)  •  Stage 4 Severe CKD (GFR = 15-29 mL/min/1 73 square meters)  •  Stage 5 End Stage CKD (GFR <15 mL/min/1 73 square meters)  Note: GFR calculation is accurate only with a steady state creatinine    CBC and differential [[id number]]  (Abnormal) Collected: 11/25/22 0024    Lab Status: Final result Specimen: Blood from Arm, Left Updated: 11/25/22 0040     WBC 10 33 Thousand/uL      RBC 4 54 Million/uL      Hemoglobin 10 0 g/dL      Hematocrit 33 0 %      MCV 73 fL      MCH 22 0 pg      MCHC 30 3 g/dL      RDW 18 7 %      MPV 11 8 fL      Platelets 402 Thousands/uL      nRBC 0 /100 WBCs      Neutrophils Relative 56 % Immat GRANS % 0 %      Lymphocytes Relative 37 %      Monocytes Relative 5 %      Eosinophils Relative 2 %      Basophils Relative 0 %      Neutrophils Absolute 5 81 Thousands/µL      Immature Grans Absolute 0 04 Thousand/uL      Lymphocytes Absolute 3 77 Thousands/µL      Monocytes Absolute 0 48 Thousand/µL      Eosinophils Absolute 0 19 Thousand/µL      Basophils Absolute 0 04 Thousands/µL                  No orders to display         Procedures  Procedures      ED Course                                       MDM  Number of Diagnoses or Management Options  Vaginal bleeding during pregnancy: new and does not require workup  Diagnosis management comments: -patient presenting to emergency department for evaluation of vaginal bleeding   -physical exam remarkable for active bleeding from cervical os   -hCG approximately 10,000   -bedside ultrasound was unable to locate fetal heart tones  I reached out to OB who recommended patient follow-up in office out of concern of missed    -I expressed my findings and concerns to the patient  Express my condolences  Patient to follow-up in the morning for further evaluation  -patient discharged         Amount and/or Complexity of Data Reviewed  Clinical lab tests: ordered and reviewed  Tests in the medicine section of CPT®: ordered and reviewed  Decide to obtain previous medical records or to obtain history from someone other than the patient: yes  Review and summarize past medical records: yes  Discuss the patient with other providers: yes  Independent visualization of images, tracings, or specimens: yes        Disposition  Final diagnoses:   Vaginal bleeding during pregnancy     Time reflects when diagnosis was documented in both MDM as applicable and the Disposition within this note     Time User Action Codes Description Comment    2022  2:06 AM Eliezer Benedict Add [O20 0] Threatened miscarriage     2022  2:06 AM Erica Alvarado Remove [O20 0] Threatened miscarriage     11/25/2022  2:06 AM Dbjayashree Frankers Add [O46 90] Vaginal bleeding during pregnancy       ED Disposition     ED Disposition   Discharge    Condition   Stable    Date/Time   Fri Nov 25, 2022  2:06 AM    Comment   Noris Mendes discharge to home/self care  Follow-up Information     Follow up With Specialties Details Why Ganesh Lai MD Obstetrics and Gynecology, Obstetrics, Gynecology Call today  [address]  [phone]            Discharge Medication List as of 11/25/2022  2:08 AM      CONTINUE these medications which have NOT CHANGED    Details   Blood Glucose Monitoring Suppl (OneTouch Verio Reflect) w/Device KIT Use 1 kit 2 (two) times a day E11 65, Please substitute with appropriate alternative as covered by patient's insurance , Starting Fri 11/12/2021, Normal      Calcium Ascorbate (VITAMIN C) 500 mg tablet Take by mouth daily, Historical Med      Cholecalciferol (Vitamin D-3) 25 MCG (1000 UT) CAPS Take 2 capsules (2,000 Units total) by mouth daily Take with dietary calcium  , Starting Mon 10/24/2022, Normal      ferrous sulfate 325 (65 Fe) mg tablet TAKE 1 TABLET BY MOUTH 2 TIMES A DAY WITH MEALS, Normal      glucose blood (OneTouch Verio) test strip E11 65, Normal      insulin aspart (NovoLOG FlexPen) 100 UNIT/ML injection pen Inject 5 Units under the skin 3 (three) times a day before meals To be titrated  T2DM and pregnancy  , Starting Mon 11/7/2022, Normal      Insulin Glargine Solostar (Lantus SoloStar) 100 UNIT/ML SOPN Inject 0 3 mL (30 Units total) under the skin daily at bedtime At 9 PM daily  T2DM and pregnancy  , Starting Mon 10/24/2022, Normal      Insulin Pen Needle 31G X 5 MM MISC Inject under the skin daily at bedtime Use 5 a day or as directed , Starting Mon 11/7/2022, Until Wed 12/7/2022, Normal      ondansetron (Zofran) 4 mg tablet Take 1 tablet (4 mg total) by mouth every 8 (eight) hours as needed for nausea or vomiting for up to 7 days, Starting Wed 10/12/2022, Until Wed 10/19/2022 at 2359, Print      OneTouch Delica Lancets 83D MISC Use 2 (two) times a day E11 65, Please substitute with appropriate alternative as covered by patient's insurance , Starting Mon 10/31/2022, Normal      Prenatal Vit-Iron Carbonyl-FA (prenatal multivitamin) TABS Take 1 tablet by mouth daily, Historical Med           No discharge procedures on file  PDMP Review     None           ED Provider  Attending physically available and evaluated Tracie Fall I managed the patient along with the ED Attending      Electronically Signed by         Albino Li DO  11/27/22 1294

## 2022-11-25 NOTE — PROGRESS NOTES
Assessment/Plan    Today we discussed findings consistent with missed    Diagnoses and all orders for this visit:    Missed       We discussed treatment options including :  1  Expectant management : Aware of risks and benefits of this including but not limited to timing , risk of bleeding and need for other interventions   2  Medical management : We discussed the use of Cytotec  Risks of this medication as well as proper use were reviewed  Aware of potential failure and need for repeat dosage   3  Surgical Management : We discussed D&E risks and benefits including but not limited to bleeding, infection , perforation , retained products needing further surgery  Most common causes of miscarriage were discussed  Genetic testing was dicussed  Aware that sometimes testing comes back inconclusive  The patient would like to proceed with surgical management  Consent and disposition papers were signed  Surgery scheduler will call on Monday with dates  Reviewed bleeding precautions in case bleeding worsens before surgery  Subjective      Walda Mohs is a 25 y o  female  Kunal Sabal reports bleeding since yesterday  She does have some cramping  She is not in acute distress  Ectopic risks: none  Cycle length: irregular   Pregnancy testing: at home  Pregnancy imaging: transvaginal ultrasound done on   Result 8wk IUP  Blood type: O negative  Other lab results: hematocrit 33  The following portions of the patient's history were reviewed and updated as appropriate: allergies, current medications, past family history, past medical history, past social history, past surgical history and problem list     Review of Systems  Pertinent items are noted in HPI        TVUS - IUP at 8 6wga, no FHT, no free fluid

## 2022-11-25 NOTE — DISCHARGE INSTRUCTIONS
Return to the emergency department if:   You have foul-smelling drainage or pus coming from your vagina  You have heavy vaginal bleeding and soak 1 pad or more in an hour  You have severe abdominal pain  You feel like your heart is beating faster than normal     You feel extremely weak or dizzy  Contact your healthcare provider if:   You have a fever greater than 100 4°F or chills  You have extreme sadness, grief, or feel unable to cope with what has happened  You have questions or concerns about your condition or care

## 2022-11-25 NOTE — ED ATTENDING ATTESTATION
11/24/2022  I, Kraig Cogan, MD, saw and evaluated the patient  I have discussed the patient with the resident/non-physician practitioner and agree with the resident's/non-physician practitioner's findings, Plan of Care, and MDM as documented in the resident's/non-physician practitioner's note, except where noted  All available labs and Radiology studies were reviewed  I was present for key portions of any procedure(s) performed by the resident/non-physician practitioner and I was immediately available to provide assistance  At this point I agree with the current assessment done in the Emergency Department  I have conducted an independent evaluation of this patient a history and physical is as follows:    ED Course         Critical Care Time  Procedures    Patient is a pleasant 25 yof, about 11 weeks pregnant who presents with lower abdominal cramping and vaginal bleeding  Otherwise feeling well  NAD  No vomiting  MDM pleasant 25 yof, will evaluate for 1st trimester vaginal bleeding

## 2022-11-25 NOTE — TELEPHONE ENCOUNTER
Regarding: Bleeding  ----- Message from [facility] sent at 11/24/2022 11:14 PM EST -----  "I am 10 wks 6d and I am now bleeding enough to fill a whole pad  I am going to head to the [facility]  "

## 2022-11-28 ENCOUNTER — ANESTHESIA EVENT (OUTPATIENT)
Dept: PERIOP | Facility: HOSPITAL | Age: 22
End: 2022-11-28

## 2022-11-29 ENCOUNTER — ANESTHESIA (OUTPATIENT)
Dept: PERIOP | Facility: HOSPITAL | Age: 22
End: 2022-11-29

## 2022-11-29 ENCOUNTER — HOSPITAL ENCOUNTER (OUTPATIENT)
Facility: HOSPITAL | Age: 22
Setting detail: OUTPATIENT SURGERY
Discharge: HOME | End: 2022-11-29
Attending: OBSTETRICS & GYNECOLOGY | Admitting: OBSTETRICS & GYNECOLOGY

## 2022-11-29 VITALS
DIASTOLIC BLOOD PRESSURE: 65 MMHG | OXYGEN SATURATION: 97 % | RESPIRATION RATE: 20 BRPM | TEMPERATURE: 98.4 F | SYSTOLIC BLOOD PRESSURE: 126 MMHG | HEART RATE: 87 BPM

## 2022-11-29 DIAGNOSIS — O02.1 MISSED ABORTION: ICD-10-CM

## 2022-11-29 DIAGNOSIS — Z98.890 S/P DILATION AND CURETTAGE: Primary | ICD-10-CM

## 2022-11-29 DIAGNOSIS — E11.65 UNCONTROLLED TYPE 2 DIABETES MELLITUS WITH HYPERGLYCEMIA (HCC): ICD-10-CM

## 2022-11-29 LAB — GLUCOSE SERPL-MCNC: 115 MG/DL (ref 65–140)

## 2022-11-29 RX ORDER — DEXAMETHASONE SODIUM PHOSPHATE 10 MG/ML
INJECTION, SOLUTION INTRAMUSCULAR; INTRAVENOUS AS NEEDED
Status: DISCONTINUED | OUTPATIENT
Start: 2022-11-29 | End: 2022-11-29

## 2022-11-29 RX ORDER — DOXYCYCLINE HYCLATE 100 MG/1
200 CAPSULE ORAL ONCE
Status: COMPLETED | OUTPATIENT
Start: 2022-11-29 | End: 2022-11-29

## 2022-11-29 RX ORDER — SODIUM CHLORIDE, SODIUM LACTATE, POTASSIUM CHLORIDE, CALCIUM CHLORIDE 600; 310; 30; 20 MG/100ML; MG/100ML; MG/100ML; MG/100ML
125 INJECTION, SOLUTION INTRAVENOUS CONTINUOUS
Status: DISCONTINUED | OUTPATIENT
Start: 2022-11-29 | End: 2022-11-29 | Stop reason: HOSPADM

## 2022-11-29 RX ORDER — ROCURONIUM BROMIDE 10 MG/ML
INJECTION, SOLUTION INTRAVENOUS AS NEEDED
Status: DISCONTINUED | OUTPATIENT
Start: 2022-11-29 | End: 2022-11-29

## 2022-11-29 RX ORDER — ONDANSETRON 2 MG/ML
INJECTION INTRAMUSCULAR; INTRAVENOUS AS NEEDED
Status: DISCONTINUED | OUTPATIENT
Start: 2022-11-29 | End: 2022-11-29

## 2022-11-29 RX ORDER — ALBUTEROL SULFATE 2.5 MG/3ML
2.5 SOLUTION RESPIRATORY (INHALATION) ONCE
Status: COMPLETED | OUTPATIENT
Start: 2022-11-29 | End: 2022-11-29

## 2022-11-29 RX ORDER — LIDOCAINE HYDROCHLORIDE 10 MG/ML
INJECTION, SOLUTION EPIDURAL; INFILTRATION; INTRACAUDAL; PERINEURAL AS NEEDED
Status: DISCONTINUED | OUTPATIENT
Start: 2022-11-29 | End: 2022-11-29

## 2022-11-29 RX ORDER — FENTANYL CITRATE 50 UG/ML
INJECTION, SOLUTION INTRAMUSCULAR; INTRAVENOUS AS NEEDED
Status: DISCONTINUED | OUTPATIENT
Start: 2022-11-29 | End: 2022-11-29

## 2022-11-29 RX ORDER — PROPOFOL 10 MG/ML
INJECTION, EMULSION INTRAVENOUS AS NEEDED
Status: DISCONTINUED | OUTPATIENT
Start: 2022-11-29 | End: 2022-11-29

## 2022-11-29 RX ORDER — GLYCOPYRROLATE 0.2 MG/ML
INJECTION INTRAMUSCULAR; INTRAVENOUS AS NEEDED
Status: DISCONTINUED | OUTPATIENT
Start: 2022-11-29 | End: 2022-11-29

## 2022-11-29 RX ORDER — FENTANYL CITRATE/PF 50 MCG/ML
25 SYRINGE (ML) INJECTION
Status: DISCONTINUED | OUTPATIENT
Start: 2022-11-29 | End: 2022-11-29 | Stop reason: HOSPADM

## 2022-11-29 RX ORDER — IBUPROFEN 600 MG/1
600 TABLET ORAL EVERY 6 HOURS PRN
Status: DISCONTINUED | OUTPATIENT
Start: 2022-11-29 | End: 2022-11-29 | Stop reason: HOSPADM

## 2022-11-29 RX ORDER — MAGNESIUM HYDROXIDE 1200 MG/15ML
LIQUID ORAL AS NEEDED
Status: DISCONTINUED | OUTPATIENT
Start: 2022-11-29 | End: 2022-11-29 | Stop reason: HOSPADM

## 2022-11-29 RX ORDER — ACETAMINOPHEN 325 MG/1
975 TABLET ORAL EVERY 6 HOURS PRN
Status: DISCONTINUED | OUTPATIENT
Start: 2022-11-29 | End: 2022-11-29 | Stop reason: HOSPADM

## 2022-11-29 RX ORDER — PROPOFOL 10 MG/ML
INJECTION, EMULSION INTRAVENOUS CONTINUOUS PRN
Status: DISCONTINUED | OUTPATIENT
Start: 2022-11-29 | End: 2022-11-29

## 2022-11-29 RX ORDER — METHYLERGONOVINE MALEATE 0.2 MG/1
0.2 TABLET ORAL EVERY 6 HOURS
Qty: 12 TABLET | Refills: 0 | Status: SHIPPED | OUTPATIENT
Start: 2022-11-29 | End: 2022-12-02

## 2022-11-29 RX ORDER — SODIUM CHLORIDE, SODIUM LACTATE, POTASSIUM CHLORIDE, CALCIUM CHLORIDE 600; 310; 30; 20 MG/100ML; MG/100ML; MG/100ML; MG/100ML
INJECTION, SOLUTION INTRAVENOUS CONTINUOUS PRN
Status: DISCONTINUED | OUTPATIENT
Start: 2022-11-29 | End: 2022-11-29

## 2022-11-29 RX ORDER — MISOPROSTOL 100 UG/1
TABLET ORAL AS NEEDED
Status: DISCONTINUED | OUTPATIENT
Start: 2022-11-29 | End: 2022-11-29 | Stop reason: HOSPADM

## 2022-11-29 RX ORDER — DEXMEDETOMIDINE HYDROCHLORIDE 100 UG/ML
INJECTION, SOLUTION INTRAVENOUS AS NEEDED
Status: DISCONTINUED | OUTPATIENT
Start: 2022-11-29 | End: 2022-11-29

## 2022-11-29 RX ORDER — SUCCINYLCHOLINE/SOD CL,ISO/PF 100 MG/5ML
SYRINGE (ML) INTRAVENOUS AS NEEDED
Status: DISCONTINUED | OUTPATIENT
Start: 2022-11-29 | End: 2022-11-29

## 2022-11-29 RX ORDER — SODIUM CHLORIDE, SODIUM LACTATE, POTASSIUM CHLORIDE, CALCIUM CHLORIDE 600; 310; 30; 20 MG/100ML; MG/100ML; MG/100ML; MG/100ML
20 INJECTION, SOLUTION INTRAVENOUS CONTINUOUS
Status: DISCONTINUED | OUTPATIENT
Start: 2022-11-29 | End: 2022-11-29 | Stop reason: HOSPADM

## 2022-11-29 RX ORDER — ONDANSETRON 2 MG/ML
4 INJECTION INTRAMUSCULAR; INTRAVENOUS ONCE AS NEEDED
Status: DISCONTINUED | OUTPATIENT
Start: 2022-11-29 | End: 2022-11-29 | Stop reason: HOSPADM

## 2022-11-29 RX ORDER — BLOOD SUGAR DIAGNOSTIC
STRIP MISCELLANEOUS
Qty: 100 STRIP | Refills: 0 | Status: SHIPPED | OUTPATIENT
Start: 2022-11-29 | End: 2022-12-01 | Stop reason: SDUPTHER

## 2022-11-29 RX ORDER — OXYCODONE HYDROCHLORIDE 5 MG/1
5 TABLET ORAL EVERY 4 HOURS PRN
Status: DISCONTINUED | OUTPATIENT
Start: 2022-11-29 | End: 2022-11-29 | Stop reason: HOSPADM

## 2022-11-29 RX ORDER — MIDAZOLAM HYDROCHLORIDE 2 MG/2ML
INJECTION, SOLUTION INTRAMUSCULAR; INTRAVENOUS AS NEEDED
Status: DISCONTINUED | OUTPATIENT
Start: 2022-11-29 | End: 2022-11-29

## 2022-11-29 RX ADMIN — ROCURONIUM BROMIDE 20 MG: 50 INJECTION INTRAVENOUS at 13:54

## 2022-11-29 RX ADMIN — FENTANYL CITRATE 100 MCG: 50 INJECTION INTRAMUSCULAR; INTRAVENOUS at 13:46

## 2022-11-29 RX ADMIN — FENTANYL CITRATE 50 MCG: 50 INJECTION INTRAMUSCULAR; INTRAVENOUS at 14:28

## 2022-11-29 RX ADMIN — MIDAZOLAM 2 MG: 1 INJECTION INTRAMUSCULAR; INTRAVENOUS at 13:39

## 2022-11-29 RX ADMIN — ONDANSETRON 4 MG: 2 INJECTION INTRAMUSCULAR; INTRAVENOUS at 13:49

## 2022-11-29 RX ADMIN — DEXAMETHASONE SODIUM PHOSPHATE 10 MG: 10 INJECTION, SOLUTION INTRAMUSCULAR; INTRAVENOUS at 13:49

## 2022-11-29 RX ADMIN — ALBUTEROL SULFATE 2.5 MG: 2.5 SOLUTION RESPIRATORY (INHALATION) at 13:18

## 2022-11-29 RX ADMIN — MIDAZOLAM 2 MG: 1 INJECTION INTRAMUSCULAR; INTRAVENOUS at 13:34

## 2022-11-29 RX ADMIN — PROPOFOL 200 MG: 10 INJECTION, EMULSION INTRAVENOUS at 13:46

## 2022-11-29 RX ADMIN — SUGAMMADEX 200 MG: 100 INJECTION, SOLUTION INTRAVENOUS at 14:28

## 2022-11-29 RX ADMIN — Medication 100 MG: at 13:47

## 2022-11-29 RX ADMIN — GLYCOPYRROLATE 0.1 MG: 0.2 INJECTION, SOLUTION INTRAMUSCULAR; INTRAVENOUS at 13:53

## 2022-11-29 RX ADMIN — SODIUM CHLORIDE, SODIUM LACTATE, POTASSIUM CHLORIDE, AND CALCIUM CHLORIDE: .6; .31; .03; .02 INJECTION, SOLUTION INTRAVENOUS at 12:48

## 2022-11-29 RX ADMIN — DEXMEDETOMIDINE HCL 12 MCG: 100 INJECTION INTRAVENOUS at 13:50

## 2022-11-29 RX ADMIN — DOXYCYCLINE 200 MG: 100 CAPSULE ORAL at 13:18

## 2022-11-29 RX ADMIN — PROPOFOL 80 MCG/KG/MIN: 10 INJECTION, EMULSION INTRAVENOUS at 13:48

## 2022-11-29 RX ADMIN — ROCURONIUM BROMIDE 10 MG: 50 INJECTION INTRAVENOUS at 13:46

## 2022-11-29 RX ADMIN — OXYCODONE HYDROCHLORIDE 5 MG: 5 TABLET ORAL at 16:19

## 2022-11-29 RX ADMIN — LIDOCAINE HYDROCHLORIDE 50 MG: 10 INJECTION, SOLUTION EPIDURAL; INFILTRATION; INTRACAUDAL; PERINEURAL at 13:46

## 2022-11-29 RX ADMIN — PROPOFOL 50 MG: 10 INJECTION, EMULSION INTRAVENOUS at 13:48

## 2022-11-29 RX ADMIN — SODIUM CHLORIDE, SODIUM LACTATE, POTASSIUM CHLORIDE, AND CALCIUM CHLORIDE: .6; .31; .03; .02 INJECTION, SOLUTION INTRAVENOUS at 14:32

## 2022-11-29 NOTE — ANESTHESIA POSTPROCEDURE EVALUATION
Post-Op Assessment Note    CV Status:  Stable  Pain Score: 0    Pain management: adequate     Mental Status:  Awake and arousable   Hydration Status:  Euvolemic   PONV Controlled:  Controlled   Airway Patency:  Patent   Two or more mitigation strategies used for obstructive sleep apnea (pt snores)   Post Op Vitals Reviewed: Yes      Staff: CRNA         No notable events documented      BP   115/58   Temp  98 2   Pulse  100   Resp   19   SpO2   98

## 2022-11-29 NOTE — ANESTHESIA PREPROCEDURE EVALUATION
Procedure:  DILATATION AND EVACUATION (D&E) (# OF WEEKS) 8 weeks (Uterus)    Relevant Problems   CARDIO   (+) Hypertriglyceridemia      ENDO   (+) Pre-existing type 2 diabetes mellitus with hyperglycemia during pregnancy in first trimester ([facility] [address] )      GYN   (+) 8 weeks gestation of pregnancy (missed Ab)      HEMATOLOGY   (+) Iron deficiency anemia due to chronic blood loss      PULMONARY   (+) Obstructive sleep apnea      Other   (+) Morbid obesity with BMI of 50 0-59 9, adult (HCC)        Physical Exam    Airway    Mallampati score: III  TM Distance: >3 FB  Neck ROM: full     Dental       Cardiovascular      Pulmonary      Other Findings        Anesthesia Plan  ASA Score- 3     Anesthesia Type- general with ASA Monitors  Additional Monitors:   Airway Plan: ETT  Plan Factors-    Chart reviewed  Existing labs reviewed  Patient summary reviewed  Induction- intravenous  Postoperative Plan- Plan for postoperative opioid use  Informed Consent- Anesthetic plan and risks discussed with patient  I personally reviewed this patient with the CRNA  Discussed and agreed on the Anesthesia Plan with the CRNA  Christina Lopez

## 2022-11-29 NOTE — OP NOTE
OPERATIVE REPORT  PATIENT NAME: Leigh Trinidad    :  2000  MRN: [id number]  Pt Location: AN OR ROOM 01    SURGERY DATE: 2022    Surgeon(s) and Role:     * Corrina Robledo MD - Primary    Preop Diagnosis:  Missed  [O02 1]    Post-Op Diagnosis Codes:     * Missed  [O02 1]    Procedure(s) (LRB):  DILATATION AND EVACUATION (D&E) (# OF WEEKS) 8 weeks (N/A)    Specimen(s):  ID Type Source Tests Collected by Time Destination   1 :  Tissue Products of Conception TISSUE EXAM Corrina Robledo MD 2022 [id number]        Estimated Blood Loss:   400 mL    Drains:  [REMOVED] NG/OG/Enteral Tube Orogastric 18 Fr Center mouth (Removed)   Number of days: 0       Anesthesia Type:   Choice    Operative Indications:  Missed  [O02 1]      Operative Findings:  Grossly normal external genitalia  Parous appearing cervix  Eight week size uterus    Complications:   None    Procedure and Technique:  Brief History    All risks, benefits, and alternatives to the procedure were discussed with the patient and she had the opportunity to ask questions  Informed consent was obtained  Description of Procedure    Patient was taken to the operating room were a time out was performed to confirm correct patient and correct procedure  General endotracheal anesthesia (GET) was administered and the patient was positioned on the OR table in the dorsal lithotomy position  All pressure points were padded and a jarod hugger was placed to maintain control of core body temperature  A bimanual exam was performed and the uterus was noted to be midposition, 8 weeks in size and consistency with no palpable adnexal masses or fullness  The patient was prepped and draped in the usual sterile fashion  Operative Technique    A straight catheter was introduced into the bladder, which was drained of 50cc of clear yellow urine   A weighted speculum was inserted into the vagina and a Higgins retractor was used to visualize the anterior lip of the cervix, which was then grasped with a single toothed tenaculum  The cervix was serially dilated to using casiano dilators for introduction of the suction curette  The 9 mm curved suction curette was introduced into the uterine cavity to the fundus  Suction was applied and curetting was performed by rotating curette 360 degrees as it was withdrawn from the uterus  Suction was released prior to reaching the cervical os  This was done for a total of 6 passes with tissue obtained on each pass  1000mcg of Cytotec was placed rectally to help with uterine contraction  Uterus was massaged down  Transabdominal ultrasound was used to ensure that the uterus was empty of its contents  Products of conception/endometrial tissue was obtained and sent for pathology  The single toothed tenaculum was removed from the anterior lip of the cervix  Good hemostasis was confirmed at the tenaculum puncture sites  Weighted speculum was then removed from the vagina  At the conclusion of the procedure, all needle, sponge, and instrument counts were noted to be correct x2  Patient tolerated the procedure well and was transferred to PACU in stable condition prior to discharge with follow up in 1-2 weeks  I was present and participated in all key portions of the case         Patient Disposition:  extubated and stable        SIGNATURE: Maty June MD  DATE: November 29, 2022  TIME: 2:36 PM

## 2022-11-29 NOTE — H&P
Assessment /Plan:     25 y o  Law[place] Blizzard with Patient's last menstrual period was 2022  with a Missed   for suction D+C   The risks (infection, bleeding, transfusion, damage to adjacent organs requiring immediate and/or delayed repair, clots inside blood vessels, need to complete procedure using alternative approach, procedural failure, worsening of pre-exisiting medical condition, and death) and alternatives  have been discussed and patient desires to proceed with Suction D+C at [facility] on 2022   Consent was reviewed in detail and signed today  Preoperative testing and antibiotics were discussed   Postoperative course discussed and post op medications were reviewed     Chief Complaint: vaginal bleeding in the first trimester    HPI:  Pt is a 25 y o  Law[place] Blizzard with Patient's last menstrual period was 2022  presented for f/u after having vaginal bleeding and cramping  She was then found to have a MAB measuring 8wga  After reviewing management options, the pt would like to proceed with suction D+C  She did receive Rhogam at the hospital over the weekend  PMHx:   Past Medical History:   Diagnosis Date   • Anxiety    • Asthma     childhood   • Chronic headache    • Current moderate episode of major depressive disorder ([facility] [address] ) 2015   • Dextroscoliosis 2016   • [facility]        • Elevated LFTs    • Migraine without aura and without status migrainosus, not intractable 2019   • PCOS (polycystic ovarian syndrome)    • Plantar fasciitis 2020   • Pre-diabetes    • Self-injurious behavior     when 15 y/o   • Varicella     vaccinated       PSHx:   Past Surgical History:   Procedure Laterality Date   • BREAST CYST INCISION AND DRAINAGE Right 2017    Procedure: INCISION AND DRAINAGE (I&D) BREAST;  Surgeon: Yesica Brown DO;  Location: [facility];  Service: General   • TONSILLECTOMY  2016       Meds:   No medications prior to admission  Allergies: No Known Allergies    Social Hx:    Social History     Socioeconomic History   • Marital status: Single     Spouse name: None   • Number of children: None   • Years of education: None   • Highest education level: None   Occupational History   • Occupation: unemployed   Tobacco Use   • Smoking status: Never   • Smokeless tobacco: Never   Vaping Use   • Vaping Use: Never used   Substance and Sexual Activity   • Alcohol use: Not Currently     Comment: OCCASIONAL/SOCIAL   • Drug use: Not Currently     Types: Marijuana   • Sexual activity: Yes     Partners: Male     Birth control/protection: None   Other Topics Concern   • None   Social History Narrative    LIVES AT HOME WITH MOM AND DAD     Social Determinants of Health     Financial Resource Strain: Not on file   Food Insecurity: Not on file   Transportation Needs: Not on file   Physical Activity: Not on file   Stress: Not on file   Social Connections: Not on file   Intimate Partner Violence: Not on file   Housing Stability: Not on file       Ob Hx:   OB History    Para Term  AB Living   1 0 0 0 0 0   SAB IAB Ectopic Multiple Live Births   0 0 0 0 0      # Outcome Date GA Lbr Salvador/2nd Weight Sex Delivery Anes PTL Lv   1 Current                Gyn HX:  denies STD, abnormal pap, significant dysmenorrhea or irregular menses    Fm Hx:   Family History   Problem Relation Age of Onset   • Hypertension Mother    • Hyperlipidemia Mother    • Diabetes Mother    • Depression Mother    • Thyroid disease Father    • Thyroid disease Sister    • Anemia Sister    • Depression Sister    • Anxiety disorder Sister    • Anemia Sister    • Anemia Sister    • Kidney disease Sister    • Appendicitis Maternal Grandmother    • Aneurysm Paternal Grandfather    • Prostate cancer Paternal Uncle    • Obesity Family    • Allergies Family    • Diabetes Family    • Substance Abuse Neg Hx    • Mental illness Neg Hx    • Stroke Neg Hx        ROS:  Review of Systems Constitutional: Negative  HENT: Negative  Respiratory: Negative  Cardiovascular: Negative  Gastrointestinal: Negative  Genitourinary: Positive for vaginal bleeding  Neurological: Negative  Psychiatric/Behavioral: Negative  VS:  LMP 08/29/2022       Exam:    Physical Exam  Vitals and nursing note reviewed  Constitutional:       Appearance: Normal appearance  She is obese  HENT:      Head: Normocephalic and atraumatic  Eyes:      Extraocular Movements: Extraocular movements intact  Conjunctiva/sclera: Conjunctivae normal       Pupils: Pupils are equal, round, and reactive to light  Cardiovascular:      Rate and Rhythm: Normal rate and regular rhythm  Heart sounds: Normal heart sounds  No murmur heard  Pulmonary:      Effort: Pulmonary effort is normal  No respiratory distress  Breath sounds: Normal breath sounds  No wheezing or rales  Abdominal:      Palpations: Abdomen is soft  Genitourinary:     Labia:         Right: No rash, tenderness, lesion or injury  Left: No rash, tenderness, lesion or injury  Musculoskeletal:      Right lower leg: No edema  Left lower leg: No edema  Skin:     General: Skin is warm and dry  Neurological:      General: No focal deficit present  Mental Status: She is alert and oriented to person, place, and time     Psychiatric:         Mood and Affect: Mood normal          Behavior: Behavior normal

## 2022-12-01 ENCOUNTER — OFFICE VISIT (OUTPATIENT)
Dept: FAMILY MEDICINE CLINIC | Facility: CLINIC | Age: 22
End: 2022-12-01

## 2022-12-01 VITALS
RESPIRATION RATE: 18 BRPM | HEIGHT: 63 IN | TEMPERATURE: 97.6 F | WEIGHT: 293 LBS | BODY MASS INDEX: 51.91 KG/M2 | SYSTOLIC BLOOD PRESSURE: 110 MMHG | HEART RATE: 81 BPM | DIASTOLIC BLOOD PRESSURE: 70 MMHG | OXYGEN SATURATION: 98 %

## 2022-12-01 DIAGNOSIS — E55.9 VITAMIN D DEFICIENCY: ICD-10-CM

## 2022-12-01 DIAGNOSIS — D50.0 IRON DEFICIENCY ANEMIA DUE TO CHRONIC BLOOD LOSS: ICD-10-CM

## 2022-12-01 DIAGNOSIS — E11.65 UNCONTROLLED TYPE 2 DIABETES MELLITUS WITH HYPERGLYCEMIA (HCC): Primary | ICD-10-CM

## 2022-12-01 DIAGNOSIS — R79.89 ELEVATED LFTS: ICD-10-CM

## 2022-12-01 DIAGNOSIS — E78.1 HYPERTRIGLYCERIDEMIA: ICD-10-CM

## 2022-12-01 DIAGNOSIS — Z23 INFLUENZA VACCINE NEEDED: ICD-10-CM

## 2022-12-01 DIAGNOSIS — E66.01 MORBID OBESITY WITH BMI OF 50.0-59.9, ADULT (HCC): ICD-10-CM

## 2022-12-01 LAB — SL AMB POCT HEMOGLOBIN AIC: 7.4 (ref ?–6.5)

## 2022-12-01 RX ORDER — FERROUS SULFATE 325(65) MG
325 TABLET ORAL
Qty: 90 TABLET | Refills: 3 | Status: SHIPPED | OUTPATIENT
Start: 2022-12-01

## 2022-12-01 RX ORDER — INSULIN GLARGINE 100 [IU]/ML
44 INJECTION, SOLUTION SUBCUTANEOUS
Qty: 45 ML | Refills: 1 | Status: SHIPPED | OUTPATIENT
Start: 2022-12-01

## 2022-12-01 RX ORDER — BLOOD SUGAR DIAGNOSTIC
STRIP MISCELLANEOUS
Qty: 300 STRIP | Refills: 3 | Status: SHIPPED | OUTPATIENT
Start: 2022-12-01 | End: 2022-12-08 | Stop reason: SDUPTHER

## 2022-12-01 RX ORDER — CHOLECALCIFEROL (VITAMIN D3) 25 MCG
2 CAPSULE ORAL DAILY
Qty: 180 CAPSULE | Refills: 3 | Status: SHIPPED | OUTPATIENT
Start: 2022-12-01

## 2022-12-01 NOTE — PROGRESS NOTES
FAMILY PRACTICE OFFICE VISIT       NAME: Eliodoro Dandy  AGE: 25 y o  SEX: female       : 2000        MRN: [id number]    Assessment and Plan   1  Uncontrolled type 2 diabetes mellitus with hyperglycemia (HCC)  Assessment & Plan:    Lab Results   Component Value Date    HGBA1C 7 4 (A) 2022     A1c has significantly improved to 7 4%  Goal <6 5%  She is working on diet and trying to walk more  Has started diabetes education  Is motivated to improve health  Congratulated on her efforts, and encouraged to keep up the great work  Has endocrinology appointment scheduled  Today we discussed, fasting glucose goal <120  After meals glucose goal<200  She is reaching these goals with current insulin dosing, with no hypoglycemia  Has Dexcom6 but notes it is overwhelming for her, and she prefers finger sticks right now  She will continue to check glucoses 4 times per day  She will call me if she has sugars <80 or >300  She tells me appropriate steps to take if experiencing hypoglycemia  She will follow up in 3 months  Orders:  -     POCT hemoglobin A1c  -     glucose blood (OneTouch Verio) test strip; USE AS DIRECTED  -     CBC; Future; Expected date: 2023  -     Comprehensive metabolic panel; Future; Expected date: 2023  -     Hemoglobin A1C; Future; Expected date: 2023  -     TSH, 3rd generation with Free T4 reflex; Future; Expected date: 2023  -     Insulin Glargine Solostar (Lantus SoloStar) 100 UNIT/ML SOPN; Inject 0 44 mL (44 Units total) under the skin daily at bedtime At 9 PM daily  -     insulin aspart (NovoLOG FlexPen) 100 UNIT/ML injection pen; Inject 5 Units under the skin 3 (three) times a day with meals    2  Iron deficiency anemia due to chronic blood loss  Assessment & Plan:  Continue daily iron supplement  Repeat CBC  Orders:  -     ferrous sulfate 325 (65 Fe) mg tablet; Take 1 tablet (325 mg total) by mouth daily with breakfast    3  Morbid obesity with BMI of 50 0-59 9, adult [facility])  Assessment & Plan:  Working on improving her diet  Trying to walk more  Orders:  -     Cholecalciferol (Vitamin D-3) 25 MCG (1000 UT) CAPS; Take 2 capsules (2,000 Units total) by mouth daily Take with dietary calcium   -     TSH, 3rd generation with Free T4 reflex; Future; Expected date: 03/01/2023  -     Insulin Glargine Solostar (Lantus SoloStar) 100 UNIT/ML SOPN; Inject 0 44 mL (44 Units total) under the skin daily at bedtime At 9 PM daily  4  Vitamin D deficiency  -     Cholecalciferol (Vitamin D-3) 25 MCG (1000 UT) CAPS; Take 2 capsules (2,000 Units total) by mouth daily Take with dietary calcium  -     Vitamin D 25 hydroxy; Future; Expected date: 03/01/2023  -     Insulin Glargine Solostar (Lantus SoloStar) 100 UNIT/ML SOPN; Inject 0 44 mL (44 Units total) under the skin daily at bedtime At 9 PM daily  5  Hypertriglyceridemia  Assessment & Plan:  Triglycerides 235  Repeat lipid panel  Orders:  -     Lipid panel; Future; Expected date: 03/01/2023    6  Elevated LFTs  Assessment & Plan:  ,   Right upper quadrant US in October shows hepatomegaly and hepatic steatosis  She did see GI in April 2021, elevated LFT's thought to be caused by obesity and fatty liver  Will repeat LFT's  Consider return to gastroenterology, hepatologist      Orders:  -     Insulin Glargine Solostar (Lantus SoloStar) 100 UNIT/ML SOPN; Inject 0 44 mL (44 Units total) under the skin daily at bedtime At 9 PM daily  7  Influenza vaccine needed  -     influenza vaccine, quadrivalent, 0 5 mL, preservative-free, for adult and pediatric patients 6 mos+ (AFLURIA, FLUARIX, FLULAVAL, FLUZONE)           Repeat blood work and follow up in 3 months           Chief Complaint     Chief Complaint   Patient presents with   • Follow-up     Pt is here for 3 mos  DM2       History of Present Illness     Claudy Reed is a 80-year-old female presenting today for follow-up on chronic conditions  Recent pregnancy loss  Recent pregnancy has motivated her to improve her health  Requests counseling for pregnancy loss  I will reach out to her Ob/gyn Sam Sr regarding this  Sore but doing okay after surgery, D&E on 11/29  Little bit of vaginal bleeding, taking medication, methergine, as per Dr Sam Rivera  Diabetes:  Currently taking: lantus 44 units, Aspart 5 units 3 times daily before meals  Fasting sugar today 97  Checking sugars 4 times daily fasting and 2 hours after meals  315 lbs at home weight  Walking more now  Watching portions  Stopped snacking  Drinking 1 gallon of water per day  Eliminated juice and fast food from her diet  She has scheduled an Endocrinology appointment  She was given a Dexcom6, however admits she really does not want to use this, would like to stick with fingersticks  This was overwhelming for her  Review of Systems   Review of Systems   Constitutional: Negative  HENT: Negative  Respiratory: Negative  Cardiovascular: Negative  Gastrointestinal: Negative  Genitourinary: Positive for pelvic pain (soreness ) and vaginal bleeding  S/p D&E   Musculoskeletal: Negative  Skin: Negative  Neurological: Negative  Psychiatric/Behavioral:        Sad, grieving pregnancy loss  I have reviewed the patient's medical history in detail; there are no changes to the history as noted in the electronic medical record  Objective     Vitals:    12/01/22 0808   BP: 110/70   Pulse: 81   Resp: 18   Temp: 97 6 °F (36 4 °C)   TempSrc: Temporal   SpO2: 98%   Weight: (!) 145 kg (320 lb)   Height: 5' 3" (1 6 m)     Wt Readings from Last 3 Encounters:   12/02/22 (!) 142 kg (314 lb)   12/01/22 (!) 145 kg (320 lb)   11/25/22 (!) 142 kg (314 lb)     Physical Exam  Vitals and nursing note reviewed  Constitutional:       General: She is not in acute distress  Appearance: Normal appearance   She is obese  She is not ill-appearing  HENT:      Head: Normocephalic and atraumatic  Cardiovascular:      Rate and Rhythm: Normal rate and regular rhythm  Heart sounds: No murmur heard  Pulmonary:      Effort: Pulmonary effort is normal  No respiratory distress  Breath sounds: Normal breath sounds  No wheezing or rales  Musculoskeletal:      Right lower leg: No edema  Left lower leg: No edema  Neurological:      Mental Status: She is alert  Psychiatric:         Mood and Affect: Mood normal           BMI Counseling: Body mass index is 56 69 kg/m²  The BMI is above normal  Nutrition recommendations include reducing portion sizes, decreasing overall calorie intake, consuming healthier snacks and decreasing soda and/or juice intake  Exercise recommendations include exercising 3-5 times per week  ALLERGIES:  No Known Allergies    Current Medications     Current Outpatient Medications   Medication Sig Dispense Refill   • Blood Glucose Monitoring Suppl (OneTouch Verio Reflect) w/Device KIT Use 1 kit 2 (two) times a day E11 65, Please substitute with appropriate alternative as covered by patient's insurance  1 kit 0   • Calcium Ascorbate (VITAMIN C) 500 mg tablet Take by mouth daily     • Cholecalciferol (Vitamin D-3) 25 MCG (1000 UT) CAPS Take 2 capsules (2,000 Units total) by mouth daily Take with dietary calcium  180 capsule 3   • ferrous sulfate 325 (65 Fe) mg tablet Take 1 tablet (325 mg total) by mouth daily with breakfast 90 tablet 3   • glucose blood (OneTouch Verio) test strip USE AS DIRECTED 300 strip 3   • insulin aspart (NovoLOG FlexPen) 100 UNIT/ML injection pen Inject 5 Units under the skin 3 (three) times a day with meals 15 mL 1   • Insulin Glargine Solostar (Lantus SoloStar) 100 UNIT/ML SOPN Inject 0 44 mL (44 Units total) under the skin daily at bedtime At 9 PM daily  45 mL 1   • Insulin Pen Needle 31G X 5 MM MISC Inject under the skin daily at bedtime Use 5 a day or as directed  100 each 1   • methylergonovine (Methergine) 0 2 mg tablet Take 1 tablet (0 2 mg total) by mouth every 6 (six) hours for 3 days 12 tablet 0   • OneTouch Delica Lancets 74Q MISC Use 2 (two) times a day E11 65, Please substitute with appropriate alternative as covered by patient's insurance  100 each 0     No current facility-administered medications for this visit           Health Maintenance     Health Maintenance   Topic Date Due   • PT PLAN OF CARE  Never done   • HIV Screening  Never done   • Hepatitis A Vaccine (2 of 2 - 2-dose series) 01/26/2018   • Pneumococcal Vaccine: Pediatrics (0 to 5 Years) and At-Risk Patients (6 to 59 Years) (2 - PCV) 02/04/2021   • COVID-19 Vaccine (3 - Booster for Pfizer series) 10/21/2021   • DTaP,Tdap,and Td Vaccines (7 - Td or Tdap) 11/17/2021   • Chlamydia Screening  04/07/2022   • Annual Physical  11/12/2022   • HEMOGLOBIN A1C  06/01/2023   • Diabetic Foot Exam  08/23/2023   • URINE MICROALBUMIN  10/24/2023   • Depression Screening  11/21/2023   • BMI: Adult  12/02/2023   • BMI: Followup Plan  12/04/2023   • DM Eye Exam  08/11/2024   • Hepatitis C Screening  Completed   • HIB Vaccine  Completed   • Hepatitis B Vaccine  Completed   • IPV Vaccine  Completed   • Meningococcal ACWY Vaccine  Completed   • Influenza Vaccine  Completed   • HPV Vaccine  Completed     Immunization History   Administered Date(s) Administered   • COVID-19 PFIZER VACCINE 0 3 ML IM 04/29/2021, 05/21/2021   • DTP 2000, 2000, 2000, 05/31/2001, 03/23/2005   • H1N1, All Formulations 10/27/2009   • HPV Quadrivalent 06/28/2011, 11/17/2011, 11/19/2012   • Hep A, ped/adol, 2 dose 07/26/2017   • Hep B, Adolescent or Pediatric 2000, 2000, 05/31/2001   • Hep B, adult 2000, 2000, 05/31/2001   • Hepatitis A 07/26/2017   • HiB 2000, 2000, 2000, 10/29/2001, 10/29/2011   • Hib (PRP-OMP) 2000, 2000, 2000, 10/29/2011   • INFLUENZA 11/17/2011, 11/19/2012, 11/26/2013   • IPV 2000, 2000, 05/23/2001, 03/23/2005   • Influenza Quadrivalent Preservative Free 3 years and older IM 01/04/2016   • Influenza, injectable, quadrivalent, preservative free 0 5 mL 12/01/2022   • Influenza, recombinant, quadrivalent,injectable, preservative free 11/07/2019   • Influenza, seasonal, injectable 11/17/2011, 11/19/2012, 11/26/2013, 11/18/2014   • MMR 05/31/2001, 03/23/2005   • Meningococcal B, OMV (BEXSERO) 04/26/2019   • Meningococcal MCV4P 11/17/2011, 07/26/2017   • Meningococcal, Unknown Serogroups 11/17/2011, 07/26/2017   • Pneumococcal Conjugate PCV 7 2000, 2000, 2000, 05/31/2001   • Pneumococcal Polysaccharide PPV23 02/04/2020   • Rho (D) Immune Globulin 11/25/2022   • Tdap 2000, 11/07/2003, 11/17/2011   • Tuberculin Skin Test-PPD Intradermal 11/07/2003, 02/24/2021   • Varicella 10/29/2001, 08/21/2008       KELLY Saleh

## 2022-12-02 ENCOUNTER — OFFICE VISIT (OUTPATIENT)
Dept: DIABETES SERVICES | Facility: CLINIC | Age: 22
End: 2022-12-02

## 2022-12-02 VITALS — BODY MASS INDEX: 55.62 KG/M2 | WEIGHT: 293 LBS

## 2022-12-02 DIAGNOSIS — E11.65 UNCONTROLLED TYPE 2 DIABETES MELLITUS WITH HYPERGLYCEMIA (HCC): Primary | ICD-10-CM

## 2022-12-02 NOTE — Clinical Note
Pt reported taking Aspart 5 units 3 times a day with her meals  I didn't see that in her medication list  Can you please have your staff look into and update it

## 2022-12-02 NOTE — PATIENT INSTRUCTIONS
Consume 3 meals a day, 4-5 hours apart  Try not to skip any meals  Aim for 45-60 grams carbohydrates per meal and 15 grams of carbohydrates per snack in between  Include whole grains, non starchy vegetables and have at least 2 whole fruits in a day

## 2022-12-02 NOTE — PROGRESS NOTES
Medical Nutrition Therapy        Assessment    Visit Type: Follow-up visit  Chief complaint/Medical Diagnosis/reason for visit E11 65 Uncontrolled T2DM with hyperglycemia  HPI Yasmin Thomas was seen today for her 6 weeks follow-up MNT visit  She informed that she had a miscarriage recently and is on insulin now  She is taking bolus as well basal insulin as instructed by her doctor  Patient also informed that she is trying to restrict her carbohydrate intake as is looking for some weight loss and then start planning a child again in 6 months  Latest hba1c was 7 4  She has cut down on her sweetened beverages/ juices, started eating more variety of fruits and vegs, seeks help in choosing healthy food options for herself and struggling with carbohydrate intake  Problems identified in food recall include inconsistent carbohydrate intake and meal skipping  Encouraged the consumption of regular meals at regular times  Advised patient to keep carbohydrate intake to 45 grams per meal and 15 grams HS snack to assist with glycemic control  Suggested keeping protein intake to 6 ounces a day and fat to 4 servings daily to assist with lipid management and calorie control  RD will remain available for further dietary questions/concerns  Ht Readings from Last 1 Encounters:   12/01/22 5' 3" (1 6 m)     Wt Readings from Last 3 Encounters:   12/02/22 (!) 142 kg (314 lb)   12/01/22 (!) 145 kg (320 lb)   11/25/22 (!) 142 kg (314 lb)     Weight Change: No    Monitoring and evaluation:    Term code indicator  FH 4 4 Mealtime Behavior Criteria: Consume 3 meals a day, 4-5 hours apart  Try not to skip any meals  Term code indicator  FH 1 6 3 Carbohydrate Intake Criteria: Aim for 45-60 grams carbohydrates per meal and 15 grams of carbohydrates per snack in between  Term code indicator  FH 1 6 4 Fiber Intake Criteria: Include whole grains, non starchy vegetables and have at least 2 whole fruits in a day      Material provided: personalized meal plan    Patient’s Response to Instruction:  Comprehensiongood  Motivationgood  Expected Compliancegood      Start- Stop: 9:41-10:13  Total Minutes: 32 Minutes  Group or Individual Instruction: LIGIA THORPE  Other: Carol Hanley      Thank you for coming to the [address] for education today  Please feel free to call with any questions or concerns      Kenji Zaidi  [address] [place] [address] Drive [id number]

## 2022-12-04 RX ORDER — INSULIN ASPART 100 [IU]/ML
5 INJECTION, SOLUTION INTRAVENOUS; SUBCUTANEOUS
Qty: 15 ML | Refills: 1 | Status: SHIPPED | OUTPATIENT
Start: 2022-12-04

## 2022-12-04 NOTE — ASSESSMENT & PLAN NOTE
Lab Results   Component Value Date    HGBA1C 7 4 (A) 12/01/2022     A1c has significantly improved to 7 4%  Goal <6 5%  She is working on diet and trying to walk more  Has started diabetes education  Is motivated to improve health  Congratulated on her efforts, and encouraged to keep up the great work  Has endocrinology appointment scheduled  Today we discussed, fasting glucose goal <120  After meals glucose goal<200  She is reaching these goals with current insulin dosing, with no hypoglycemia  Has Dexcom6 but notes it is overwhelming for her, and she prefers finger sticks right now  She will continue to check glucoses 4 times per day  She will call me if she has sugars <80 or >300  She tells me appropriate steps to take if experiencing hypoglycemia  She will follow up in 3 months

## 2022-12-04 NOTE — ASSESSMENT & PLAN NOTE
,   Right upper quadrant US in October shows hepatomegaly and hepatic steatosis  She did see GI in April 2021, elevated LFT's thought to be caused by obesity and fatty liver  Will repeat LFT's   Consider return to gastroenterology, hepatologist

## 2022-12-06 ENCOUNTER — TELEPHONE (OUTPATIENT)
Dept: OBGYN CLINIC | Facility: CLINIC | Age: 22
End: 2022-12-06

## 2022-12-06 NOTE — TELEPHONE ENCOUNTER
Spoke to Dr Mana Rockwell  Patient to start taking Ibuprofen, she can take 600mg every 6 hours  If she has severe pain or saturating a pad every hour for two hours she should call the office

## 2022-12-06 NOTE — TELEPHONE ENCOUNTER
Patient called, she recently had a miscarriage requiring a D&E on 11/29  She states she is having pretty bad cramping since the procedure  She feels like it is getting worse  She states the cramping comes and goes  It is worse then her period cramps and she states her period cramps were pretty bad  She is taking tylenol 1000 mg at a time  She does not have ibuprofen at home to take  She is having intermittent bleeding as well  She went to the gym yesterday and saturated a pad while there but today doesn't have any bleeding  She denies any fevers  She has a post op apt on 12/13 with you  Do you have any recommendations regarding the cramping she is having?

## 2022-12-07 ENCOUNTER — SOCIAL WORK (OUTPATIENT)
Dept: BEHAVIORAL/MENTAL HEALTH CLINIC | Facility: CLINIC | Age: 22
End: 2022-12-07

## 2022-12-07 DIAGNOSIS — F32.A MILD DEPRESSION: Primary | ICD-10-CM

## 2022-12-08 DIAGNOSIS — E11.65 UNCONTROLLED TYPE 2 DIABETES MELLITUS WITH HYPERGLYCEMIA (HCC): ICD-10-CM

## 2022-12-08 NOTE — PSYCH
Assessment/Plan: Obtain background information     Diagnoses and all orders for this visit:    Mild depression          Subjective:  Pt presented for initial therapy session  Pt provided all necessary background information  She last had therapy in   Pt grew up in [place] with both parents and 3 older sisters  She is close with her parents and two of her sisters  She is not close to her oldest sister  Pt completed high school and some community college courses  Pt resides with her boyfriend, Damian Prado  They have been together for 5 years and met when they were both in   Damian Sportsman works FT in a [employer] and pt works FT at [employer] inside [employer]  Pt enjoys baking, watching [employer] and spending time with Damian Prado  Pt is seeking therapy due to a recent miscarriage at 11 weeks (8 weeks gestation)  This was an unplanned pregnancy but pt and Damian Prado were both very excited as were both sides of the family  Pt has been unable to sleep and is not yet ready to return to work  In the meantime she is doing [employer] for some money  Processed pt's feelings at length and developed a plan for returning to work at [employer] [facility] [employer]  Also encouraged pt to utilize guided meditation  Patient ID: Juan Saul is a 25 y o  female  HPI    Review of Systems  : Pt was pleasant, cooperative and engaged  She presented with a flat affect and congruent mood as well as being tearful throughout  Objective:     Physical Exam         Diagnoses and all orders for this visit:    Mild depression         HPI:     Pre-morbid level of function and History of Present Illness:  Onset s/p miscarriage 2 weeks ago  Previous Psychiatric/psychological treatment/year: Therapist in   Current Psychiatrist/Therapist: [facility]  Outpatient and/or Partial and Other [employer] Used ([facility], I[facility], V[facility]): NA      Problem Assessment:     SOCIAL/VOCATION:  Family Constellation (include parents, relationship with each and pertinent Psych/Medical History):     Family History   Problem Relation Age of Onset   • Hypertension Mother    • Hyperlipidemia Mother    • Diabetes Mother    • Depression Mother    • Thyroid disease Father    • Thyroid disease Sister    • Anemia Sister    • Depression Sister    • Anxiety disorder Sister    • Anemia Sister    • Anemia Sister    • Kidney disease Sister    • Appendicitis Maternal Grandmother    • Aneurysm Paternal Grandfather    • Prostate cancer Paternal Uncle    • Obesity Family    • Allergies Family    • Diabetes Family    • Substance Abuse Neg Hx    • Mental illness Neg Hx    • Stroke Neg Hx        Mother: NA  Spouse: NA   Father: NA   Children: NA   Sibling: NA   Sibling: NA   Children: NA   Other: NA    Yoshi Lora relates best to her family and partner  she lives with her partner, Miguel Angel Nixon  she does not live alone  Domestic Violence: No past history of domestic violence    Additional Comments related to family/relationships/peer support: Pt's family and significant other are supportive  School or Work History (strengths/limitations/needs): Pt has a FT job    Her highest grade level achieved was Some college     history includes NA    Financial status includes Able to pay bills    LEISURE ASSESSMENT (Include past and present hobbies/interests and level of involvement (Ex: Group/Club Affiliations): Baking, Netflix, time with family and friends  her primary language is [place]   Preferred language is [place]  Ethnic considerations are   Religions affiliations and level of involvement Active   Does spirituality help you cope?  Yes     FUNCTIONAL STATUS: There has been a recent change in Yoshi Lora ability to do the following: NA    Level of Assistance Needed/By Whom?: Myles Fierro learns best by  demonstration    SUBSTANCE ABUSE ASSESSMENT: no substance abuse    Substance/Route/Age/Amount/Frequency/Last Use: NA    DETOX HISTORY: NA    Previous detox/rehab treatment: NA    HEALTH ASSESSMENT: no referral to PCP needed    LEGAL: No Mental Health Advance Directive or Power of  on file    Prenatal History: Recent miscarriage    Delivery History: N/A    Developmental Milestones: N/A  Temperament as an infant was N/A  Temperament as a toddler was N/A  Temperament at school age was N/A  Temperament as a teenager was N/A  Risk Assessment:   The following ratings are based on my observation of this patient over the last Session    Risk of Harm to Self:   Demographic risk factors include NA  Historical Risk Factors include NA  Recent Specific Risk Factors include diagnosis of depression   Additional Factors for a Child or Adolescent gender: female (more likely to attempt)    Risk of Harm to Others:   Demographic Risk Factors include NA  Historical Risk Factors include NA  Recent Specific Risk Factors include NA    Access to Weapons:   Sergio Sharma has access to the following weapons: None   The following steps have been taken to ensure weapons are properly secured: NA    Based on the above information, the client presents the following risk of harm to self or others:  low    The following interventions are recommended:   no intervention changes    Notes regarding this Risk Assessment: NA        Review Of Systems:     Mood Depression   Behavior Normal    Thought Content Normal   General Sleep Disturbances   Personality Change in Personality   Other Psych Symptoms Normal   Constitutional Normal   ENT Normal   Cardiovascular Normal    Respiratory Normal    Gastrointestinal Normal   Genitourinary Normal    Musculoskeletal Negative   Integumentary Normal    Neurological Normal    Endocrine Normal          Mental status:  Appearance calm and cooperative    Mood depressed   Affect affect was flat   Speech a normal rate   Thought Processes normal thought processes   Hallucinations no hallucinations present    Thought Content no delusions   Abnormal Thoughts no suicidal thoughts  and no homicidal thoughts    Orientation  oriented to person and place and time   Remote Memory short term memory intact and long term memory intact   Attention Span concentration intact   Intellect Appears to be of Average Intelligence   Fund of Knowledge displays adequate knowledge of current events   Insight Insight intact   Judgement judgment was intact   Muscle Strength Muscle strength and tone were normal   Language NA   Pain none   Pain Scale 0     Visit start and stop times:    12/07/22  Start Time: 1017  Stop Time: 1104  Total Visit Time: 47 minutes

## 2022-12-09 RX ORDER — BLOOD SUGAR DIAGNOSTIC
STRIP MISCELLANEOUS
Qty: 300 STRIP | Refills: 3 | Status: SHIPPED | OUTPATIENT
Start: 2022-12-09 | End: 2022-12-10 | Stop reason: SDUPTHER

## 2022-12-10 DIAGNOSIS — O24.111 PRE-EXISTING TYPE 2 DIABETES MELLITUS WITH HYPERGLYCEMIA DURING PREGNANCY IN FIRST TRIMESTER (HCC): ICD-10-CM

## 2022-12-10 DIAGNOSIS — Z3A.08 8 WEEKS GESTATION OF PREGNANCY: ICD-10-CM

## 2022-12-10 DIAGNOSIS — R79.89 ELEVATED LFTS: ICD-10-CM

## 2022-12-10 DIAGNOSIS — E11.65 UNCONTROLLED TYPE 2 DIABETES MELLITUS WITH HYPERGLYCEMIA (HCC): ICD-10-CM

## 2022-12-10 DIAGNOSIS — E66.01 MATERNAL MORBID OBESITY, ANTEPARTUM (HCC): ICD-10-CM

## 2022-12-10 DIAGNOSIS — E11.65 PRE-EXISTING TYPE 2 DIABETES MELLITUS WITH HYPERGLYCEMIA DURING PREGNANCY IN FIRST TRIMESTER (HCC): ICD-10-CM

## 2022-12-10 DIAGNOSIS — E66.01 MORBID OBESITY WITH BMI OF 50.0-59.9, ADULT (HCC): ICD-10-CM

## 2022-12-10 DIAGNOSIS — O99.210 MATERNAL MORBID OBESITY, ANTEPARTUM (HCC): ICD-10-CM

## 2022-12-10 DIAGNOSIS — E55.9 VITAMIN D DEFICIENCY: ICD-10-CM

## 2022-12-12 DIAGNOSIS — E11.65 UNCONTROLLED TYPE 2 DIABETES MELLITUS WITH HYPERGLYCEMIA (HCC): ICD-10-CM

## 2022-12-12 RX ORDER — BLOOD SUGAR DIAGNOSTIC
STRIP MISCELLANEOUS
Qty: 300 STRIP | Refills: 0 | Status: SHIPPED | OUTPATIENT
Start: 2022-12-12 | End: 2022-12-12

## 2022-12-12 RX ORDER — BLOOD SUGAR DIAGNOSTIC
STRIP MISCELLANEOUS
Qty: 300 STRIP | Refills: 6 | Status: SHIPPED | OUTPATIENT
Start: 2022-12-12

## 2022-12-13 ENCOUNTER — OFFICE VISIT (OUTPATIENT)
Dept: OBGYN CLINIC | Facility: CLINIC | Age: 22
End: 2022-12-13

## 2022-12-13 VITALS
BODY MASS INDEX: 51.91 KG/M2 | SYSTOLIC BLOOD PRESSURE: 120 MMHG | WEIGHT: 293 LBS | DIASTOLIC BLOOD PRESSURE: 80 MMHG | HEIGHT: 63 IN

## 2022-12-13 DIAGNOSIS — Z09 POSTOPERATIVE EXAMINATION: Primary | ICD-10-CM

## 2022-12-15 NOTE — PROGRESS NOTES
Subjective     Yanna Copeland is a 25 y o  female who presents to the clinic 2 weeks status post suction D+C for 8 week MAB  Eating a regular diet without difficulty  Bowel movements are normal  The patient is not having any pain  Bleeding has stopped  She has been going to the gym and continuing the diabetic diet  She has an appt with endocrine and has meet with the [facility] at [facility]    The following portions of the patient's history were reviewed and updated as appropriate: allergies, current medications, past family history, past medical history, past social history, past surgical history and problem list     Review of Systems  Pertinent items are noted in HPI  Objective     /80 (BP Location: Right arm, Patient Position: Sitting, Cuff Size: Standard)   Ht 5' 3" (1 6 m)   Wt (!) 144 kg (316 lb 9 6 oz)   LMP 08/29/2022   BMI 56 08 kg/m²   General:  alert and oriented, in no acute distress     Assessment      Doing well postoperatively  Operative findings again reviewed  Pathology report discussed  Plan     1  Continue any current medications  2  Wound care discussed  3  Activity restrictions: none  4  Anticipated return to work: now    5  Follow up: 3 months for annual

## 2023-01-02 ENCOUNTER — OFFICE VISIT (OUTPATIENT)
Dept: URGENT CARE | Age: 23
End: 2023-01-02

## 2023-01-02 VITALS — TEMPERATURE: 97.2 F | HEART RATE: 99 BPM | RESPIRATION RATE: 16 BRPM | OXYGEN SATURATION: 99 %

## 2023-01-02 DIAGNOSIS — J01.40 ACUTE NON-RECURRENT PANSINUSITIS: Primary | ICD-10-CM

## 2023-01-02 RX ORDER — AMOXICILLIN AND CLAVULANATE POTASSIUM 875; 125 MG/1; MG/1
1 TABLET, FILM COATED ORAL EVERY 12 HOURS SCHEDULED
Qty: 20 TABLET | Refills: 0 | Status: SHIPPED | OUTPATIENT
Start: 2023-01-02 | End: 2023-01-12

## 2023-01-03 NOTE — PATIENT INSTRUCTIONS
Please take Augmentin 2x daily for 10 days  1   Drink plenty fluids  2   Take probiotics [i e  Yogurt, Acidophilus, Florastor (liquid)] daily  3   Over-the-counter medications as needed for symptomatic care  4    Advance activities as tolerated  5    Follow-up with your primary care physician in 3-4 days  6   Go to emergency room if symptoms are worsening      7   Use a humidifier at bedtime

## 2023-01-03 NOTE — PROGRESS NOTES
[email or web address]        NAME: Ruddy Qiu is a 25 y o  female  : 2000    MRN: [id number]  DATE: 2023  TIME: 7:46 PM    Assessment and Plan   Acute non-recurrent pansinusitis [J01 40]  1  Acute non-recurrent pansinusitis  amoxicillin-clavulanate (AUGMENTIN) 875-125 mg per tablet            Patient Instructions     Antibiotics BID x 10 days  Follow up with PCP in 3-5 days  Proceed to  ER if symptoms worsen  Chief Complaint     Chief Complaint   Patient presents with   • Sinusitis     For 2 months, intermittent mucus, sinus pressure, only on right side,          History of Present Illness       Patient presenting for evaluation of sinus pain and pressure, green nasal discharge and congestion has been persistent over the past 2 months has not seek medical attention as she was pregnant, but had a recent miscarriage  She denies any fevers or chills at this time  She denies any current treatment for her symptoms  Sinusitis  Associated symptoms include sinus pressure  Pertinent negatives include no chills or coughing  Review of Systems   Review of Systems   Constitutional: Negative for chills and fever  HENT: Positive for rhinorrhea, sinus pressure and sinus pain  Respiratory: Negative for cough  All other systems reviewed and are negative          Current Medications       Current Outpatient Medications:   •  amoxicillin-clavulanate (AUGMENTIN) 875-125 mg per tablet, Take 1 tablet by mouth every 12 (twelve) hours for 10 days, Disp: 20 tablet, Rfl: 0  •  Blood Glucose Monitoring Suppl (OneTouch Verio Reflect) w/Device KIT, Use 1 kit 2 (two) times a day E11 65, Please substitute with appropriate alternative as covered by patient's insurance , Disp: 1 kit, Rfl: 0  •  Calcium Ascorbate (VITAMIN C) 500 mg tablet, Take by mouth daily, Disp: , Rfl:   •  Cholecalciferol (Vitamin D-3) 25 MCG (1000 UT) CAPS, Take 2 capsules (2,000 Units total) by mouth daily Take with dietary calcium  , Disp: 180 capsule, Rfl: 3  •  ferrous sulfate 325 (65 Fe) mg tablet, Take 1 tablet (325 mg total) by mouth daily with breakfast, Disp: 90 tablet, Rfl: 3  •  glucose blood (OneTouch Verio) test strip, Check blood glucose 4 times daily, fasting and 2 hours after meals  , Disp: 300 strip, Rfl: 6  •  insulin aspart (NovoLOG FlexPen) 100 UNIT/ML injection pen, Inject 5 Units under the skin 3 (three) times a day with meals, Disp: 15 mL, Rfl: 1  •  Insulin Glargine Solostar (Lantus SoloStar) 100 UNIT/ML SOPN, Inject 0 44 mL (44 Units total) under the skin daily at bedtime At 9 PM daily  , Disp: 45 mL, Rfl: 1  •  Insulin Pen Needle 31G X 5 MM MISC, Inject under the skin daily at bedtime Use 5 a day or as directed , Disp: 100 each, Rfl: 0  •  OneTouch Delica Lancets 30W MISC, Use 2 (two) times a day E11 65, Please substitute with appropriate alternative as covered by patient's insurance , Disp: 100 each, Rfl: 0  •  methylergonovine (Methergine) 0 2 mg tablet, Take 1 tablet (0 2 mg total) by mouth every 6 (six) hours for 3 days, Disp: 12 tablet, Rfl: 0    Current Allergies     Allergies as of 01/02/2023   • (No Known Allergies)            The following portions of the patient's history were reviewed and updated as appropriate: allergies, current medications, past family history, past medical history, past social history, past surgical history and problem list      Past Medical History:   Diagnosis Date   • Anxiety    • Asthma     childhood   • Chronic headache    • Current moderate episode of major [facility] disorder [facility] ) 09/29/2015   • Dextroscoliosis 04/05/2016   • Diabetes mellitus ([facility] )     2019   • Elevated LFTs    • Migraine without aura and without status migrainosus, not intractable 08/16/2019   • PCOS (polycystic ovarian syndrome)    • Plantar fasciitis 06/18/2020   • Pre-diabetes    • Self-injurious behavior     when 15 y/o   • Varicella     vaccinated       Past Surgical History:   Procedure Laterality Date   • BREAST CYST INCISION AND DRAINAGE Right 2017    Procedure: INCISION AND DRAINAGE (I&D) BREAST;  Surgeon: Devin Longoria DO;  Location: [facility];  Service: General   • UT TX MISSED  FIRST TRIMESTER SURGICAL N/A 2022    Procedure: DILATATION AND EVACUATION (D&E) (# OF WEEKS) 8 weeks;  Surgeon: Lee Sheikh MD;  Location: [facility];  Service: Gynecology   • TONSILLECTOMY  2016       Family History   Problem Relation Age of Onset   • Hypertension Mother    • Hyperlipidemia Mother    • Diabetes Mother    • Depression Mother    • Thyroid disease Father    • Thyroid disease Sister    • Anemia Sister    • Depression Sister    • Anxiety disorder Sister    • Anemia Sister    • Anemia Sister    • Kidney disease Sister    • Appendicitis Maternal Grandmother    • Aneurysm Paternal Grandfather    • Prostate cancer Paternal Uncle    • Obesity Family    • Allergies Family    • Diabetes Family    • Substance Abuse Neg Hx    • Mental illness Neg Hx    • Stroke Neg Hx          Medications have been verified  Objective   Pulse 99   Temp (!) 97 2 °F (36 2 °C)   Resp 16   LMP 2022   SpO2 99%        Physical Exam     Physical Exam  Constitutional:       General: She is not in acute distress  Appearance: Normal appearance  She is normal weight  She is not ill-appearing, toxic-appearing or diaphoretic  HENT:      Head: Normocephalic and atraumatic  Right Ear: Tympanic membrane is injected and erythematous  Left Ear: Tympanic membrane normal       Nose: Congestion present  No rhinorrhea  Right Sinus: Maxillary sinus tenderness and frontal sinus tenderness present  Left Sinus: No maxillary sinus tenderness or frontal sinus tenderness  Eyes:      General:         Right eye: No discharge  Left eye: No discharge  Cardiovascular:      Pulses: Normal pulses  Heart sounds: Normal heart sounds  No murmur heard  No friction rub  No gallop  Pulmonary:      Effort: Pulmonary effort is normal  No respiratory distress  Breath sounds: Normal breath sounds  No stridor  No wheezing, rhonchi or rales  Chest:      Chest wall: No tenderness  Skin:     General: Skin is warm and dry  Neurological:      Mental Status: She is alert     Psychiatric:         Mood and Affect: Mood normal          Behavior: Behavior normal

## 2023-02-22 ENCOUNTER — OFFICE VISIT (OUTPATIENT)
Dept: URGENT CARE | Facility: MEDICAL CENTER | Age: 23
End: 2023-02-22

## 2023-02-22 VITALS
TEMPERATURE: 99.2 F | RESPIRATION RATE: 20 BRPM | WEIGHT: 293 LBS | SYSTOLIC BLOOD PRESSURE: 140 MMHG | BODY MASS INDEX: 51.91 KG/M2 | HEIGHT: 63 IN | OXYGEN SATURATION: 99 % | DIASTOLIC BLOOD PRESSURE: 80 MMHG | HEART RATE: 90 BPM

## 2023-02-22 DIAGNOSIS — L03.90 CELLULITIS, UNSPECIFIED CELLULITIS SITE: Primary | ICD-10-CM

## 2023-02-22 RX ORDER — AMOXICILLIN AND CLAVULANATE POTASSIUM 875; 125 MG/1; MG/1
1 TABLET, FILM COATED ORAL EVERY 12 HOURS SCHEDULED
Qty: 14 TABLET | Refills: 0 | Status: SHIPPED | OUTPATIENT
Start: 2023-02-22 | End: 2023-03-01

## 2023-02-22 NOTE — PATIENT INSTRUCTIONS
I prescribed Augmentin 875 mg twice a day for 7 days  Advised patient to apply over-the-counter antibiotic ointment  Apply warm compress  If symptoms persist or worsen, strongly advised patient to remove nasal piercing  She expressed understanding  Cellulitis   WHAT YOU NEED TO KNOW:   Cellulitis is a skin infection caused by bacteria  Cellulitis is common and can become severe  Cellulitis usually appears on the lower legs  It can also appear on the arms, face, and other areas  Cellulitis develops when bacteria enter a crack or break in your skin, such as a scratch, bite, or cut  DISCHARGE INSTRUCTIONS:   Return to the emergency department if:   Your wound gets larger and more painful  You feel a crackling under your skin when you touch it  You have purple dots or bumps on your skin, or you see bleeding under your skin  You see red streaks coming from the infected area  Call your doctor if:   The red, warm, swollen area gets larger  Your fever or pain does not go away or gets worse  The area does not get smaller after 3 days of antibiotics  You have questions or concerns about your condition or care  Medicines: You should start to see improvement in 3 days  If cellulitis is not treated, the infection can spread through your body and become life-threatening  You may need any of the following medicines:  Antibiotics  help treat a bacterial infection  Acetaminophen  decreases pain and fever  It is available without a doctor's order  Ask how much to take and how often to take it  Follow directions  Read the labels of all other medicines you are using to see if they also contain acetaminophen, or ask your doctor or pharmacist  Acetaminophen can cause liver damage if not taken correctly  NSAIDs , such as ibuprofen, help decrease swelling, pain, and fever  This medicine is available with or without a doctor's order   NSAIDs can cause stomach bleeding or kidney problems in certain people  If you take blood thinner medicine, always ask your healthcare provider if NSAIDs are safe for you  Always read the medicine label and follow directions  Take your medicine as directed  Contact your healthcare provider if you think your medicine is not helping or if you have side effects  Tell your provider if you are allergic to any medicine  Keep a list of the medicines, vitamins, and herbs you take  Include the amounts, and when and why you take them  Bring the list or the pill bottles to follow-up visits  Carry your medicine list with you in case of an emergency  Self-care:   Wash the area with soap and water every day  Gently pat dry  Use bandages if directed by your healthcare provider  Apply cream or ointment as directed  These help protect the area  Most over-the-counter products, such as petroleum jelly, are good to use  Ask your healthcare provider about specific creams or ointments you should use  Place a cool, damp cloth on the area  Use clean cloths and clean water  You can do this as often as you need to  Cool, damp cloths may help decrease pain  Elevate the area above the level of your heart  as often as you can  This will help decrease swelling and pain  Prop the area on pillows or blankets to keep it elevated comfortably  Prevent cellulitis:   Do not scratch bug bites or areas of injury  You increase your risk for cellulitis by scratching these areas  Do not share personal items, such as towels, clothing, and razors  Clean exercise equipment  with germ-killing  before and after you use it  Treat athlete's foot  This can help prevent the spread of a bacterial skin infection  Wash your hands often  Use soap and water  Wash your hands after you use the bathroom, change a child's diapers, or sneeze  Wash your hands before you prepare or eat food  Use lotion to prevent dry, cracked skin         Follow up with your doctor within 3 days, or as directed: He or she will check if your cellulitis is getting better  Write down your questions so you remember to ask them during your visits  © Copyright [employer] 2022 Information is for End User's use only and may not be sold, redistributed or otherwise used for commercial purposes  The above information is an  only  It is not intended as medical advice for individual conditions or treatments  Talk to your doctor, nurse or pharmacist before following any medical regimen to see if it is safe and effective for you

## 2023-02-22 NOTE — PROGRESS NOTES
[facility]        NAME: Chapis Mccoy is a 21 y o  female  : 2000    MRN: [id number]  DATE: 2023  TIME: 2:10 PM    Assessment and Plan   Cellulitis, unspecified cellulitis site [L03 90]  1  Cellulitis, unspecified cellulitis site  amoxicillin-clavulanate (AUGMENTIN) 875-125 mg per tablet            Patient Instructions       Follow up with PCP in 3-5 days  Proceed to  ER if symptoms worsen  Chief Complaint     Chief Complaint   Patient presents with   • Rash     Patient has a raised area on her nostril where she has her nose pierced  She states she pulled some skin off and now it is red         History of Present Illness       49-year-old female here today with complaints of raised red lesion left nostril that appeared in the last 3 days  She recently had a nasal piercing placed back in January  She was following standard of care for piercing irrigating the area with saline with no problem  However in the last 2 to 3 days it became red, scab  Today she noticed some drainage  Tender to touch  Denies fever  Decided come to urgent care for assessment  Past medical history significant for type 2 diabetes on diet control only  Review of Systems   Review of Systems   Constitutional: Negative  Skin: Positive for wound  Current Medications       Current Outpatient Medications:   •  amoxicillin-clavulanate (AUGMENTIN) 875-125 mg per tablet, Take 1 tablet by mouth every 12 (twelve) hours for 7 days, Disp: 14 tablet, Rfl: 0  •  Blood Glucose Monitoring Suppl (OneTouch Verio Reflect) w/Device KIT, Use 1 kit 2 (two) times a day E11 65, Please substitute with appropriate alternative as covered by patient's insurance , Disp: 1 kit, Rfl: 0  •  Calcium Ascorbate (VITAMIN C) 500 mg tablet, Take by mouth daily, Disp: , Rfl:   •  Cholecalciferol (Vitamin D-3) 25 MCG (1000 UT) CAPS, Take 2 capsules (2,000 Units total) by mouth daily Take with dietary calcium  , Disp: 180 capsule, Rfl: 3  •  ferrous sulfate 325 (65 Fe) mg tablet, Take 1 tablet (325 mg total) by mouth daily with breakfast, Disp: 90 tablet, Rfl: 3  •  glucose blood (OneTouch Verio) test strip, Check blood glucose 4 times daily, fasting and 2 hours after meals  , Disp: 300 strip, Rfl: 6  •  insulin aspart (NovoLOG FlexPen) 100 UNIT/ML injection pen, Inject 5 Units under the skin 3 (three) times a day with meals, Disp: 15 mL, Rfl: 1  •  Insulin Glargine Solostar (Lantus SoloStar) 100 UNIT/ML SOPN, Inject 0 44 mL (44 Units total) under the skin daily at bedtime At 9 PM daily  , Disp: 45 mL, Rfl: 1  •  Insulin Pen Needle 31G X 5 MM MISC, Inject under the skin daily at bedtime Use 5 a day or as directed , Disp: 100 each, Rfl: 0  •  methylergonovine (Methergine) 0 2 mg tablet, Take 1 tablet (0 2 mg total) by mouth every 6 (six) hours for 3 days, Disp: 12 tablet, Rfl: 0  •  OneTouch Delica Lancets 39O MISC, Use 2 (two) times a day E11 65, Please substitute with appropriate alternative as covered by patient's insurance , Disp: 100 each, Rfl: 0    Current Allergies     Allergies as of 02/22/2023   • (No Known Allergies)            The following portions of the patient's history were reviewed and updated as appropriate: allergies, current medications, past family history, past medical history, past social history, past surgical history and problem list      Past Medical History:   Diagnosis Date   • Anxiety    • Asthma     childhood   • Chronic headache    • Current moderate episode of major depressive disorder ([facility] [address] ) 09/29/2015   • Dextroscoliosis 04/05/2016   • Diabetes mellitus ([facility] [address] )     2019   • Elevated LFTs    • Migraine without aura and without status migrainosus, not intractable 08/16/2019   • PCOS (polycystic ovarian syndrome)    • Plantar fasciitis 06/18/2020   • Pre-diabetes    • Self-injurious behavior     when 15 y/o   • Varicella     vaccinated       Past Surgical History:   Procedure Laterality Date   • BREAST CYST INCISION AND DRAINAGE Right 2017    Procedure: INCISION AND DRAINAGE (I&D) BREAST;  Surgeon: Lesvia Vernon DO;  [facility];  Service: General   • IN [place] MISSED  FIRST TRIMESTER SURGICAL N/A 2022    Procedure: DILATATION AND EVACUATION (D&E) (# OF WEEKS) 8 weeks;  Surgeon: Antonio Dietz MD;  Location[facility];  Service: Gynecology   • TONSILLECTOMY         Family History   Problem Relation Age of Onset   • Hypertension Mother    • Hyperlipidemia Mother    • Diabetes Mother    • Depression Mother    • Thyroid disease Father    • Thyroid disease Sister    • Anemia Sister    • Depression Sister    • Anxiety disorder Sister    • Anemia Sister    • Anemia Sister    • Kidney disease Sister    • Appendicitis Maternal Grandmother    • Aneurysm Paternal Grandfather    • Prostate cancer Paternal Uncle    • Obesity Family    • Allergies Family    • Diabetes Family    • Substance Abuse Neg Hx    • Mental illness Neg Hx    • Stroke Neg Hx          Medications have been verified  Objective   /80   Pulse 90   Temp 99 2 °F (37 3 °C)   Resp 20   Ht 5' 3" (1 6 m)   Wt (!) 145 kg (320 lb)   LMP  (LMP Unknown)   SpO2 99%   BMI 56 69 kg/m²   No LMP recorded (lmp unknown)  Physical Exam     Physical Exam  Vitals and nursing note reviewed  Constitutional:       Appearance: Normal appearance  HENT:      Nose: Nose normal    Skin:     Findings: Erythema present  Comments: Left nostril reveals a metal piercing with what seems to be a small hematoma/hemangioma measuring about 3 to 4 mm  No purulent discharge observed  However the borders are slightly erythematous swollen tender to touch   Neurological:      Mental Status: She is alert

## 2023-02-22 NOTE — PROGRESS NOTES
Patient ID: Elysia Grimes is a 21 y o  female Date of Birth 2000       Chief Complaint   Patient presents with   • Ingrown Toenail     B/L great toe   • Callouses     Left heel             Diagnosis:  1  Ingrown nail of great toe  -     lidocaine (XYLOCAINE) 1 % injection 3 mL  -     Nail removal      1  Initial bilateral pedal examination with socks and shoes removed  2   Today we discussed etiology and treatment options of ingrown toenails  3   Phenol and alcohol matricectomy was performed to left great toe medial nail border as this is the one that is most painful and recurrent  Please see procedure note  4   Right great toenail lateral nail border is pain-free and without infection, we will discussed phenol and alcohol matricectomy on the side when is convenient for patient  5   Patient given postoperative soaking and care instructions, she will follow-up in 2 weeks  Nail removal    Date/Time: 2/24/2023 10:33 AM  Performed by: Alisha Silverio DPM  Authorized by: Alisha Silverio DPM     Patient location:  [facility] Protocol:  Consent: Verbal consent obtained  Risks and benefits: risks, benefits and alternatives were discussed  Consent given by: patient  Time out: Immediately prior to procedure a "time out" was called to verify the correct patient, procedure, equipment, support staff and site/side marked as required  Patient understanding: patient does not state understanding of the procedure being performed  Patient identity confirmed: verbally with patient      Location:     Foot:  L big toe  Pre-procedure details:     Preparation: Patient was prepped and draped in the usual sterile fashion    Anesthesia (see MAR for exact dosages):      Anesthesia method:  Local infiltration    Local anesthetic:  Lidocaine 1% w/o epi  Nail Removal:     Nail removed:  Partial    Nail side:  Medial    Nail bed sutured: no    Ingrown nail:     Wedge excision of skin: no      Nail matrix removed or ablated:  Partial  Post-procedure details:     Dressing:  4x4 sterile gauze, antibiotic ointment and gauze roll    Patient tolerance of procedure: Tolerated well, no immediate complications  Comments:      Ring block left hallux with 3 cc 1% plain lidocaine         Subjective:   Patient presents today for care of of bilateral great toes with ingrown toenails, patient states her mom trimmed the 1 on the right and it is feeling better but the one on the left they were unable to get out  In the past she has had pedicures but the ingrown keeps coming back        The following portions of the patient's history were reviewed and updated as appropriate:     Past Medical History:   Diagnosis Date   • Anxiety    • Asthma     childhood   • Chronic headache    • Current moderate episode of major depressive disorder ([facility] ) 2015   • Dextroscoliosis 2016   • Diabetes mellitus ([facility] )        • Elevated LFTs    • Migraine without aura and without status migrainosus, not intractable 2019   • PCOS (polycystic ovarian syndrome)    • Plantar fasciitis 2020   • Pre-diabetes    • Self-injurious behavior     when 15 y/o   • Varicella     vaccinated       Past Surgical History:   Procedure Laterality Date   • BREAST CYST INCISION AND DRAINAGE Right 2017    Procedure: INCISION AND DRAINAGE (I&D) BREAST;  Surgeon: Carmen Bonner DO;  [facility];  Service: General   • OR [place] MISSED  FIRST TRIMESTER SURGICAL N/A 2022    Procedure: DILATATION AND EVACUATION (D&E) (# OF WEEKS) 8 weeks;  Surgeon: Callum Taveras MD;  [facility];  Service: Gynecology   • TONSILLECTOMY         Social History     Socioeconomic History   • Marital status: Single     Spouse name: None   • Number of children: None   • Years of education: None   • Highest education level: None   Occupational History   • Occupation: unemployed   Tobacco Use   • Smoking status: Never   • Smokeless tobacco: Never Vaping Use   • Vaping Use: Never used   Substance and Sexual Activity   • Alcohol use: Yes     Comment: OCCASIONAL/SOCIAL   • Drug use: Not Currently     Types: Marijuana   • Sexual activity: Yes     Partners: Male     Birth control/protection: None   Other Topics Concern   • None   Social History Narrative    LIVES AT HOME WITH MOM AND DAD     Social Determinants of Health     Financial Resource Strain: Not on file   Food Insecurity: Not on file   Transportation Needs: Not on file   Physical Activity: Not on file   Stress: Not on file   Social Connections: Not on file   Intimate Partner Violence: Not on file   Housing Stability: Not on file          Current Outpatient Medications:   •  amoxicillin-clavulanate (AUGMENTIN) 875-125 mg per tablet, Take 1 tablet by mouth every 12 (twelve) hours for 7 days, Disp: 14 tablet, Rfl: 0  •  Blood Glucose Monitoring Suppl (OneTouch Verio Reflect) w/Device KIT, Use 1 kit 2 (two) times a day E11 65, Please substitute with appropriate alternative as covered by patient's insurance , Disp: 1 kit, Rfl: 0  •  Calcium Ascorbate (VITAMIN C) 500 mg tablet, Take by mouth daily, Disp: , Rfl:   •  Cholecalciferol (Vitamin D-3) 25 MCG (1000 UT) CAPS, Take 2 capsules (2,000 Units total) by mouth daily Take with dietary calcium  , Disp: 180 capsule, Rfl: 3  •  ferrous sulfate 325 (65 Fe) mg tablet, Take 1 tablet (325 mg total) by mouth daily with breakfast, Disp: 90 tablet, Rfl: 3  •  glucose blood (OneTouch Verio) test strip, Check blood glucose 4 times daily, fasting and 2 hours after meals  , Disp: 300 strip, Rfl: 6  •  insulin aspart (NovoLOG FlexPen) 100 UNIT/ML injection pen, Inject 5 Units under the skin 3 (three) times a day with meals, Disp: 15 mL, Rfl: 1  •  Insulin Glargine Solostar (Lantus SoloStar) 100 UNIT/ML SOPN, Inject 0 44 mL (44 Units total) under the skin daily at bedtime At 9 PM daily  , Disp: 45 mL, Rfl: 1  •  OneTouch Delica Lancets 53D MISC, Use 2 (two) times a day E11 65, Please substitute with appropriate alternative as covered by patient's insurance , Disp: 100 each, Rfl: 0  •  Insulin Pen Needle 31G X 5 MM MISC, Inject under the skin daily at bedtime Use 5 a day or as directed , Disp: 100 each, Rfl: 0  •  methylergonovine (Methergine) 0 2 mg tablet, Take 1 tablet (0 2 mg total) by mouth every 6 (six) hours for 3 days, Disp: 12 tablet, Rfl: 0  No current facility-administered medications for this visit  Allergies  Patient has no known allergies  Family History   Problem Relation Age of Onset   • Hypertension Mother    • Hyperlipidemia Mother    • Diabetes Mother    • Depression Mother    • Thyroid disease Father    • Thyroid disease Sister    • Anemia Sister    • Depression Sister    • Anxiety disorder Sister    • Anemia Sister    • Anemia Sister    • Kidney disease Sister    • Appendicitis Maternal Grandmother    • Aneurysm Paternal Grandfather    • Prostate cancer Paternal Uncle    • Obesity Family    • Allergies Family    • Diabetes Family    • Substance Abuse Neg Hx    • Mental illness Neg Hx    • Stroke Neg Hx            Objective:    Review of Systems   Constitutional: Negative for chills and fever  HENT: Negative for ear pain and sore throat  Eyes: Negative for pain and visual disturbance  Respiratory: Negative for cough and shortness of breath  Cardiovascular: Negative for chest pain and palpitations  Gastrointestinal: Negative for abdominal pain and vomiting  Genitourinary: Negative for dysuria and hematuria  Musculoskeletal: Negative for arthralgias and back pain  Skin: Negative for color change and rash  Ingrown toenails bilateral great toes   Neurological: Negative for seizures and syncope  All other systems reviewed and are negative  Physical Exam  Constitutional:       Appearance: Normal appearance  She is well-developed  She is obese  HENT:      Head: Normocephalic and atraumatic        Nose: Nose normal  Mouth/Throat:      Mouth: Mucous membranes are moist       Pharynx: Oropharynx is clear  Eyes:      Conjunctiva/sclera: Conjunctivae normal    Cardiovascular:      Pulses:           Dorsalis pedis pulses are 2+ on the right side and 2+ on the left side  Posterior tibial pulses are 2+ on the right side and 2+ on the left side  Pulmonary:      Effort: Pulmonary effort is normal    Musculoskeletal:         General: Normal range of motion  Cervical back: Normal range of motion  Right lower le+ Pitting Edema present  Left lower le+ Pitting Edema present  Feet:      Right foot:      Protective Sensation: 10 sites tested  10 sites sensed  Skin integrity: Skin integrity normal       Toenail Condition: Right toenails are normal       Left foot:      Protective Sensation: 10 sites tested  10 sites sensed  Skin integrity: Skin integrity normal       Toenail Condition: Left toenails are ingrown  Comments: 1  Painful incurvated medial border left great toenail, no signs of infection noted  2   MMT 5/5 bilateral  3  Active and passive range of motion is within normal limits and pain-free to bilateral foot and ankles  4   Pes planus foot type bilateral  Skin:     General: Skin is warm and dry  Neurological:      Mental Status: She is alert and oriented to person, place, and time  Psychiatric:         Behavior: Behavior normal          Thought Content: Thought content normal          Judgment: Judgment normal                         No pertinent results found  Elizabeth Cedeño DPM, DPM, FACFAS    Portions of the record may have been created with voice recognition software  Occasional wrong word or "sound a like" substitutions may have occurred due to the inherent limitations of voice recognition software  Read the chart carefully and recognize, using context, where substitutions have occurred

## 2023-02-24 ENCOUNTER — HOSPITAL ENCOUNTER (EMERGENCY)
Facility: HOSPITAL | Age: 23
Discharge: HOME | End: 2023-02-24
Attending: EMERGENCY MEDICINE

## 2023-02-24 ENCOUNTER — OFFICE VISIT (OUTPATIENT)
Dept: PODIATRY | Facility: CLINIC | Age: 23
End: 2023-02-24

## 2023-02-24 VITALS
WEIGHT: 293 LBS | DIASTOLIC BLOOD PRESSURE: 84 MMHG | HEART RATE: 89 BPM | SYSTOLIC BLOOD PRESSURE: 137 MMHG | HEIGHT: 63 IN | BODY MASS INDEX: 51.91 KG/M2

## 2023-02-24 VITALS
HEART RATE: 65 BPM | RESPIRATION RATE: 18 BRPM | TEMPERATURE: 98 F | OXYGEN SATURATION: 98 % | DIASTOLIC BLOOD PRESSURE: 92 MMHG | SYSTOLIC BLOOD PRESSURE: 169 MMHG

## 2023-02-24 DIAGNOSIS — L60.0 INGROWN NAIL OF GREAT TOE: Primary | ICD-10-CM

## 2023-02-24 DIAGNOSIS — L98.9 LESION OF SKIN OF NOSE: Primary | ICD-10-CM

## 2023-02-24 RX ORDER — DIAPER,BRIEF,INFANT-TODD,DISP
EACH MISCELLANEOUS 4 TIMES DAILY PRN
Status: DISCONTINUED | OUTPATIENT
Start: 2023-02-24 | End: 2023-02-24 | Stop reason: HOSPADM

## 2023-02-24 RX ORDER — LIDOCAINE HYDROCHLORIDE 10 MG/ML
3 INJECTION, SOLUTION INFILTRATION; PERINEURAL ONCE
Status: COMPLETED | OUTPATIENT
Start: 2023-02-24 | End: 2023-02-24

## 2023-02-24 RX ORDER — DIAPER,BRIEF,INFANT-TODD,DISP
1 EACH MISCELLANEOUS 2 TIMES DAILY
Qty: 14 G | Refills: 0 | Status: SHIPPED | OUTPATIENT
Start: 2023-02-24 | End: 2023-03-03

## 2023-02-24 RX ADMIN — HYDROCORTISONE 1 APPLICATION.: 1 CREAM TOPICAL at 16:55

## 2023-02-24 RX ADMIN — LIDOCAINE HYDROCHLORIDE 3 ML: 10 INJECTION, SOLUTION INFILTRATION; PERINEURAL at 10:39

## 2023-02-24 NOTE — DISCHARGE INSTRUCTIONS
You have been given a prescription for hydrocortisone cream with instructions to apply 1 g topically 2 times a day for 7 days  You have also been given an ambulatory referral to dermatology and instructed to make an appointment as soon as possible  Should you develop increased swelling, redness, fever, difficulty breathing or any other symptoms that you find concerning please return to the emergency department immediately

## 2023-02-24 NOTE — ED ATTENDING ATTESTATION
2/24/2023  I, Radha Painting MD, saw and evaluated the patient  I have discussed the patient with the resident/non-physician practitioner and agree with the resident's/non-physician practitioner's findings, Plan of Care, and MDM as documented in the resident's/non-physician practitioner's note, except where noted  All available labs and Radiology studies were reviewed  I was present for key portions of any procedure(s) performed by the resident/non-physician practitioner and I was immediately available to provide assistance  At this point I agree with the current assessment done in the Emergency Department  I have conducted an independent evaluation of this patient a history and physical is as follows:    30-year-old otherwise healthy female presented for evaluation of lump developing around her left nostril piercing since it was done more than a month ago  She denies any pain to the area, states it is just unsightly  She has left the piercing in place  No erythema, drainage, fever, chills  Piercing removed  On exam there appears to be a keloid which extends from the external surface of the left nostril to the internal surface  No abscess or fluctuance  Discussed follow-up with dermatology for further management  We will give topical hydrocortisone for now but will likely need injection/excision      ED Course         Critical Care Time  Procedures

## 2023-02-24 NOTE — ED PROVIDER NOTES
History  Chief Complaint   Patient presents with   • Medical Problem     Pt got nose piercing on 1/13 and now has bubble growing  Pt reports increased swelling and pain at piercing site  The patient is a 40-year-old female with a past medical history of asthma, diabetes and PCOS who presents to the emergency department with complaint of nose swelling  The patient states that she got her nose pierced on 1/13/2023 and since that time she has noticed increased swelling and pain of the left nostril  The patient reports that it is tender around the piercing itself but denies any fluctuance or redness  The patient also denied lightheadedness, change in vision, nasal drainage or fever  Prior to Admission Medications   Prescriptions Last Dose Informant Patient Reported? Taking? Blood Glucose Monitoring Suppl (OneTouch Verio Reflect) w/Device KIT   No No   Sig: Use 1 kit 2 (two) times a day E11 65, Please substitute with appropriate alternative as covered by patient's insurance  Calcium Ascorbate (VITAMIN C) 500 mg tablet   Yes No   Sig: Take by mouth daily   Cholecalciferol (Vitamin D-3) 25 MCG (1000 UT) CAPS   No No   Sig: Take 2 capsules (2,000 Units total) by mouth daily Take with dietary calcium  Insulin Glargine Solostar (Lantus SoloStar) 100 UNIT/ML SOPN   No No   Sig: Inject 0 44 mL (44 Units total) under the skin daily at bedtime At 9 PM daily  Insulin Pen Needle 31G X 5 MM MISC   No No   Sig: Inject under the skin daily at bedtime Use 5 a day or as directed  OneTouch Delica Lancets 02N MISC   No No   Sig: Use 2 (two) times a day E11 65, Please substitute with appropriate alternative as covered by patient's insurance     amoxicillin-clavulanate (AUGMENTIN) 875-125 mg per tablet   No No   Sig: Take 1 tablet by mouth every 12 (twelve) hours for 7 days   ferrous sulfate 325 (65 Fe) mg tablet   No No   Sig: Take 1 tablet (325 mg total) by mouth daily with breakfast   glucose blood (OneTouch Verio) test strip   No No   Sig: Check blood glucose 4 times daily, fasting and 2 hours after meals     insulin aspart (NovoLOG FlexPen) 100 UNIT/ML injection pen   No No   Sig: Inject 5 Units under the skin 3 (three) times a day with meals   methylergonovine (Methergine) 0 2 mg tablet   No No   Sig: Take 1 tablet (0 2 mg total) by mouth every 6 (six) hours for 3 days      Facility-Administered Medications Last Administration Doses Remaining   lidocaine (XYLOCAINE) 1 % injection 3 mL 2023 10:39 AM 0          Past Medical History:   Diagnosis Date   • Anxiety    • Asthma     childhood   • Chronic headache    • Current moderate episode of major depressive disorder ([facility] ) 2015   • Dextroscoliosis 2016   • Diabetes mellitus ([facility] )        • Elevated LFTs    • Migraine without aura and without status migrainosus, not intractable 2019   • PCOS (polycystic ovarian syndrome)    • Plantar fasciitis 2020   • Pre-diabetes    • Self-injurious behavior     when 15 y/o   • Varicella     vaccinated       Past Surgical History:   Procedure Laterality Date   • BREAST CYST INCISION AND DRAINAGE Right 2017    Procedure: INCISION AND DRAINAGE (I&D) BREAST;  Surgeon: Devin Longoria DO;  [facility];  Service: General   • [place] TX MISSED  FIRST TRIMESTER SURGICAL N/A 2022    Procedure: DILATATION AND EVACUATION (D&E) (# OF WEEKS) 8 weeks;  Surgeon: Lee Sheikh MD;  [facility];  Service: Gynecology   • TONSILLECTOMY         Family History   Problem Relation Age of Onset   • Hypertension Mother    • Hyperlipidemia Mother    • Diabetes Mother    • Depression Mother    • Thyroid disease Father    • Thyroid disease Sister    • Anemia Sister    • Depression Sister    • Anxiety disorder Sister    • Anemia Sister    • Anemia Sister    • Kidney disease Sister    • Appendicitis Maternal Grandmother    • Aneurysm Paternal Grandfather    • Prostate cancer Paternal Uncle • Obesity Family    • Allergies Family    • Diabetes Family    • Substance Abuse Neg Hx    • Mental illness Neg Hx    • Stroke Neg Hx      I have reviewed and agree with the history as documented  E-Cigarette/Vaping   • E-Cigarette Use Never User      E-Cigarette/Vaping Substances   • Nicotine No    • THC No    • CBD No    • Flavoring No    • Other No    • Unknown No      Social History     Tobacco Use   • Smoking status: Never   • Smokeless tobacco: Never   Vaping Use   • Vaping Use: Never used   Substance Use Topics   • Alcohol use: Yes     Comment: OCCASIONAL/SOCIAL   • Drug use: Not Currently     Types: Marijuana        Review of Systems   Constitutional: Negative for chills, fatigue and fever  HENT: Negative for congestion, ear pain and sore throat  Eyes: Negative for photophobia, pain and visual disturbance  Respiratory: Negative for cough, shortness of breath, wheezing and stridor  Cardiovascular: Negative for chest pain, palpitations and leg swelling  Gastrointestinal: Negative for abdominal distention, abdominal pain, diarrhea, nausea and vomiting  Endocrine: Negative  Genitourinary: Negative for dysuria and hematuria  Musculoskeletal: Negative for arthralgias, back pain, neck pain and neck stiffness  Skin: Negative for color change and rash  Allergic/Immunologic: Negative  Neurological: Negative for seizures, syncope, weakness, light-headedness, numbness and headaches  Hematological: Negative  Psychiatric/Behavioral: Negative  All other systems reviewed and are negative        Physical Exam  ED Triage Vitals [02/24/23 1555]   Temperature Pulse Respirations Blood Pressure SpO2   98 °F (36 7 °C) 65 18 169/92 98 %      Temp Source Heart Rate Source Patient Position - Orthostatic VS BP Location FiO2 (%)   Oral Monitor Sitting Right arm --      Pain Score       --             Orthostatic Vital Signs  Vitals:    02/24/23 1555   BP: 169/92   Pulse: 65   Patient Position - Orthostatic VS: Sitting       Physical Exam  Vitals and nursing note reviewed  Constitutional:       General: She is not in acute distress  Appearance: Normal appearance  She is well-developed  She is obese  She is not ill-appearing  HENT:      Head: Normocephalic and atraumatic  Nose: Nose normal       Mouth/Throat:      Mouth: Mucous membranes are moist       Pharynx: Oropharynx is clear  Eyes:      Extraocular Movements: Extraocular movements intact  Conjunctiva/sclera: Conjunctivae normal       Pupils: Pupils are equal, round, and reactive to light  Cardiovascular:      Rate and Rhythm: Normal rate and regular rhythm  Pulses: Normal pulses  Heart sounds: Normal heart sounds  No murmur heard  No friction rub  Pulmonary:      Effort: Pulmonary effort is normal  No respiratory distress  Breath sounds: Normal breath sounds  No stridor  No wheezing or rhonchi  Abdominal:      General: Abdomen is flat  Bowel sounds are normal  There is no distension  Palpations: Abdomen is soft  Tenderness: There is no abdominal tenderness  There is no guarding or rebound  Musculoskeletal:         General: No swelling or tenderness  Normal range of motion  Cervical back: Normal range of motion and neck supple  No rigidity or tenderness  Right lower leg: No edema  Left lower leg: No edema  Lymphadenopathy:      Cervical: No cervical adenopathy  Skin:     General: Skin is warm and dry  Capillary Refill: Capillary refill takes less than 2 seconds  Coloration: Skin is not jaundiced  Findings: Lesion present  No erythema or rash  Comments: Small 1 mm lesion on the left nostril at the site of the nose piercing  No erythema or purulence noted  Neurological:      General: No focal deficit present  Mental Status: She is alert and oriented to person, place, and time  Mental status is at baseline  Cranial Nerves: No cranial nerve deficit  Sensory: No sensory deficit  Motor: No weakness  Psychiatric:         Mood and Affect: Mood normal          ED Medications  Medications   hydrocortisone 1 % cream (1 application Topical Given 2/24/23 3738)       Diagnostic Studies  Results Reviewed     None                 No orders to display         Procedures  Procedures      ED Course                                       Medical Decision Making  The patient is a 68-year-old female with a past medical history of asthma, diabetes and PCOS who presents to the emergency department with complaint of nose swelling  Upon initial presentation the patient was alert and oriented x4 and in no acute distress  Upon physical exam there was noted edema and tenderness around the piercing site on the left nostril  The piercing was removed and there was no purulence noted upon palpation  The remainder of her physical exam was grossly unremarkable  She will be given hydrocortisone cream as well as an ambulatory referral to dermatology  Return precautions will be discussed  Further instructions per discharge orders  Disposition  Final diagnoses:   Lesion of skin of nose     Time reflects when diagnosis was documented in both MDM as applicable and the Disposition within this note     Time User Action Codes Description Comment    2/24/2023  4:52 PM Karthik Craven Add [L98 9] Lesion of skin of nose       ED Disposition     ED Disposition   Discharge    Condition   Stable    Date/Time   Fri Feb 24, 2023  4:52 PM    [address] discharge to home/self care                 Follow-up Information     Follow up With Specialties Details Why Contact Info Additional Information    [facility] Schedule an appointment as soon as possible for a visit  for skin lesion [phone] [facility] [place], [address], [place] [address], [place], [address]     [phone]    [facility] Emergency Medicine  As needed [address] [facility] [address] [facility], [address], [place], [place], [address]          Patient's Medications   Discharge Prescriptions    HYDROCORTISONE 1 % OINTMENT    Apply 1 g (1 application total) topically 2 (two) times a day for 7 days       Start Date: 2/24/2023 End Date: 3/3/2023       Order Dose: 1 application       Quantity: 14 g    Refills: 0         PDMP Review     None           ED Provider  Attending physically available and evaluated Ruddy Qiu I managed the patient along with the ED Attending      Electronically Signed by         Gabino Shirley DO  02/24/23 6775

## 2023-03-03 ENCOUNTER — TELEPHONE (OUTPATIENT)
Dept: DIABETES SERVICES | Facility: CLINIC | Age: 23
End: 2023-03-03

## 2023-03-03 DIAGNOSIS — E11.65 UNCONTROLLED TYPE 2 DIABETES MELLITUS WITH HYPERGLYCEMIA (HCC): Primary | ICD-10-CM

## 2023-03-03 NOTE — TELEPHONE ENCOUNTER
Pt is scheduled for fu dm education on 3/6  Her previous referral   Could you please issue a new one   Thanks

## 2023-05-01 ENCOUNTER — OFFICE VISIT (OUTPATIENT)
Dept: ENDOCRINOLOGY | Facility: CLINIC | Age: 23
End: 2023-05-01

## 2023-05-01 VITALS
HEART RATE: 66 BPM | DIASTOLIC BLOOD PRESSURE: 88 MMHG | BODY MASS INDEX: 51.91 KG/M2 | SYSTOLIC BLOOD PRESSURE: 140 MMHG | HEIGHT: 63 IN | WEIGHT: 293 LBS

## 2023-05-01 DIAGNOSIS — R79.89 ABNORMAL TSH: ICD-10-CM

## 2023-05-01 DIAGNOSIS — E55.9 VITAMIN D DEFICIENCY: ICD-10-CM

## 2023-05-01 DIAGNOSIS — E66.01 MORBID OBESITY WITH BMI OF 50.0-59.9, ADULT (HCC): ICD-10-CM

## 2023-05-01 DIAGNOSIS — E11.65 UNCONTROLLED TYPE 2 DIABETES MELLITUS WITH HYPERGLYCEMIA (HCC): Primary | ICD-10-CM

## 2023-05-01 DIAGNOSIS — R79.89 ELEVATED LFTS: ICD-10-CM

## 2023-05-01 LAB — SL AMB POCT HEMOGLOBIN AIC: 8.6 (ref ?–6.5)

## 2023-05-01 RX ORDER — INSULIN GLARGINE 100 [IU]/ML
INJECTION, SOLUTION SUBCUTANEOUS
Qty: 45 ML | Refills: 1
Start: 2023-05-01

## 2023-05-01 RX ORDER — METFORMIN HYDROCHLORIDE 500 MG/1
500 TABLET, EXTENDED RELEASE ORAL
Qty: 30 TABLET | Refills: 5 | Status: SHIPPED | OUTPATIENT
Start: 2023-05-01

## 2023-05-01 RX ORDER — PEN NEEDLE, DIABETIC 32GX 5/32"
NEEDLE, DISPOSABLE MISCELLANEOUS
Qty: 150 EACH | Refills: 1 | Status: SHIPPED | OUTPATIENT
Start: 2023-05-01

## 2023-05-01 NOTE — PROGRESS NOTES
Michelle Lopez 21 y o  female MRN: [id number]    Encounter: [id number]      Assessment/Plan     Assessment: This is a 21y o -year-old female with diabetes with hyperglycemia  Plan:    Diagnoses and all orders for this visit:    Uncontrolled type 2 diabetes mellitus with hyperglycemia (HCC)  A1c, worsened to 8 6%, not at goal   Goal for A1c is less than 6 5% as patient is in childbearing age and want to pursue pregnancy  Discuss the importance of weight loss, improving A1c is well as patient is willing to get pregnant  Discussed the option of glucagon like peptide therapy which can help to improve glycemic control as well as for weight loss  Also discussed that it is not safe to be use in pregnancy and she should use contraception, reliable thyroid to prevent pregnancy  Discussed the side effects and benefits  Patient is agreeable to start glucagon like peptide therapy  Start with 0 25 mg once a week, will increase the dose to 0 5 mg after 4 weeks if she tolerates the dose  Discussed she will have to stop the Ozempic 1 month prior if she wants to pursue pregnancy  Educated about hypoglycemia symptoms and treatment  Lab Results   Component Value Date    HGBA1C 8 6 (A) 05/01/2023     Lab Results   Component Value Date    HGBA1C 8 6 (A) 05/01/2023         -     POCT hemoglobin A1c    Morbid obesity with BMI of 50 0-59 9, adult ([facility] )  Discussed the lifestyle modifications, including diet and exercise  Will refer to nutrition therapy  We will start glucagon like peptide therapy  Elevated blood pressure  She has elevated blood pressure today however she was having a very tough day today as she had a flat tire, and had to get Mimi Naidu to come to the office  Usually her blood pressure is well controlled as per  patient    AbNormal TSH  Repeat TSH, free T4 and thyroid antibodies now  TSH is still elevated about 3 0, she would benefit from starting levothyroxine therapy    This was discussed with patient and she is agreeable to the plan  CC: Diabetes    History of Present Illness     HPI:    Xander Purdy is 35-year-old woman with medical history of type 2 diabetes, morbid obesity is here for evaluation and management of type 2 diabetes  Diabetes course has been unstable     Denies any recent hospitalization for hyperglycemia or hypoglycemia  Current regimen is insulin glargine 44 units at bedtime, NovoLog 5 units 3 times daily with meals  However patient stated that she has not been taking any insulin as directed  Menstruation cycles haven been regular   She is not taking metformin , she has tried it before, and could not tolerate it ( stomach pain and diarrhea)   He has a glucometer however has not been checking blood sugars regularly since she had a miscarriage  She was pregnant during September 2022, was started on insulin regimen, however she had a miscarriage in November 2022 after that she stopped taking insulin  Her A1c from today is 8 6%    She denies history of thyroid problems in the past, last TSH was slightly elevated at 3 7  Lab Results   Component Value Date    HGBA1C 8 6 (A) 05/01/2023     Lab Results   Component Value Date    [id number] 3 770 (H) 03/31/2021          Review of Systems   Constitutional: Positive for activity change, fatigue and unexpected weight change  Negative for diaphoresis and fever  HENT: Negative  Eyes: Negative for visual disturbance  Respiratory: Negative for cough, chest tightness and shortness of breath  Cardiovascular: Negative for chest pain, palpitations and leg swelling  Gastrointestinal: Positive for constipation  Negative for abdominal pain, blood in stool, diarrhea, nausea and vomiting  Endocrine: Positive for cold intolerance  Negative for heat intolerance, polydipsia, polyphagia and polyuria  Genitourinary: Negative for dysuria, enuresis, frequency and urgency  Musculoskeletal: Negative for arthralgias and myalgias     Skin: Negative for pallor, rash and wound  Allergic/Immunologic: Negative  Neurological: Negative for dizziness, tremors, weakness and numbness  Hematological: Negative  Psychiatric/Behavioral: Negative          Historical Information   Past Medical History:   Diagnosis Date    Anxiety     Asthma     childhood    Chronic headache     Current moderate episode of major depressive disorder ([facility] ) 2015    Dextroscoliosis 2016    Diabetes mellitus ([facility] )         Elevated LFTs     Migraine without aura and without status migrainosus, not intractable 2019    PCOS (polycystic ovarian syndrome)     Plantar fasciitis 2020    Pre-diabetes     Self-injurious behavior     when 15 y/o    Varicella     vaccinated     Past Surgical History:   Procedure Laterality Date    BREAST CYST INCISION AND DRAINAGE Right 2017    Procedure: INCISION AND DRAINAGE (I&D) BREAST;  Surgeon: Nila Michaud DO;  [facility];  Service: General    [place] TX MISSED  FIRST TRIMESTER SURGICAL N/A 2022    Procedure: DILATATION AND EVACUATION (D&E) (# OF WEEKS) 8 weeks;  Surgeon: Aishwarya Meek MD;  [facility];  Service: Gynecology    TONSILLECTOMY  2016     Social History   Social History     Substance and Sexual Activity   Alcohol Use Yes    Comment: OCCASIONAL/SOCIAL     Social History     Substance and Sexual Activity   Drug Use Not Currently    Types: Marijuana     Social History     Tobacco Use   Smoking Status Never   Smokeless Tobacco Never     Family History:   Family History   Problem Relation Age of Onset    Hypertension Mother     Hyperlipidemia Mother     Diabetes Mother     Depression Mother     Thyroid disease Father     Thyroid disease Sister     Anemia Sister     Depression Sister     Anxiety disorder Sister     Anemia Sister     Anemia Sister     Kidney disease Sister     Appendicitis Maternal Grandmother     Aneurysm Paternal Андрей Segovia Prostate cancer Paternal Uncle     Obesity Family     Allergies Family     Diabetes Family     Substance Abuse Neg Hx     Mental illness Neg Hx     Stroke Neg Hx        Meds/Allergies   Current Outpatient Medications   Medication Sig Dispense Refill    Blood Glucose Monitoring Suppl (OneTouch Verio Reflect) w/Device KIT Use 1 kit 2 (two) times a day E11 65, Please substitute with appropriate alternative as covered by patient's insurance  1 kit 0    Calcium Ascorbate (VITAMIN C) 500 mg tablet Take 500 mg by mouth daily      Cholecalciferol (Vitamin D-3) 25 MCG (1000 UT) CAPS Take 2 capsules (2,000 Units total) by mouth daily Take with dietary calcium  180 capsule 3    ferrous sulfate 325 (65 Fe) mg tablet Take 1 tablet (325 mg total) by mouth daily with breakfast 90 tablet 3    glucose blood (OneTouch Verio) test strip Check blood glucose 4 times daily, fasting and 2 hours after meals  300 strip 6    Insulin Glargine Solostar (Lantus SoloStar) 100 UNIT/ML SOPN Inject 20 units at bedtime 45 mL 1    Insulin Pen Needle (BD Pen Needle Nayeli U/F) 32G X 4 MM MISC Use once daily 150 each 1    metFORMIN (GLUCOPHAGE-XR) 500 mg 24 hr tablet Take 1 tablet (500 mg total) by mouth daily with dinner 30 tablet 5    OneTouch Delica Lancets 79V MISC Use 2 (two) times a day E11 65, Please substitute with appropriate alternative as covered by patient's insurance  100 each 0    semaglutide, 0 25 or 0 5 mg/dose, (Ozempic, 0 25 or 0 5 MG/DOSE,) 2 mg/3 mL injection pen Inject 0 25 mg once a week for 4 weeks and increase the dose to 0 5 mg once a week after 4 weeks 3 mL 5    hydrocortisone 1 % ointment Apply 1 g (1 application total) topically 2 (two) times a day for 7 days (Patient not taking: Reported on 5/1/2023) 14 g 0    Insulin Pen Needle 31G X 5 MM MISC Inject under the skin daily at bedtime Use 5 a day or as directed   (Patient not taking: Reported on 5/1/2023) 100 each 0    methylergonovine (Methergine) 0 2 mg "tablet Take 1 tablet (0 2 mg total) by mouth every 6 (six) hours for 3 days (Patient not taking: Reported on 5/1/2023) 12 tablet 0     No current facility-administered medications for this visit  No Known Allergies    Objective   Vitals: Blood pressure 140/88, pulse 66, height 5' 3\" (1 6 m), weight (!) 148 kg (326 lb 3 2 oz), unknown if currently breastfeeding  Physical Exam  Vitals reviewed  Constitutional:       General: She is not in acute distress  Appearance: Normal appearance  She is obese  She is not ill-appearing  HENT:      Head: Normocephalic and atraumatic  Nose: Nose normal       Mouth/Throat:      Mouth: Mucous membranes are moist    Eyes:      Extraocular Movements: Extraocular movements intact  Conjunctiva/sclera: Conjunctivae normal    Cardiovascular:      Rate and Rhythm: Normal rate and regular rhythm  Pulses: Normal pulses  Heart sounds: Normal heart sounds  Pulmonary:      Effort: Pulmonary effort is normal  No respiratory distress  Breath sounds: Normal breath sounds  Abdominal:      General: Bowel sounds are normal       Palpations: Abdomen is soft  Musculoskeletal:         General: Normal range of motion  Cervical back: Normal range of motion and neck supple  Right lower leg: No edema  Left lower leg: No edema  Skin:     General: Skin is warm and dry  Findings: No rash  Neurological:      General: No focal deficit present  Mental Status: She is alert and oriented to person, place, and time  Psychiatric:         Mood and Affect: Mood normal          Behavior: Behavior normal          The history was obtained from the review of the chart, patient      Lab Results:   Lab Results   Component Value Date/Time    Hemoglobin A1C 8 6 (A) 05/01/2023 03:22 PM    Hemoglobin A1C 7 4 (A) 12/01/2022 08:28 AM    Hemoglobin A1C 9 1 (H) 10/24/2022 11:15 AM    Hemoglobin A1C 11 2 (A) 08/23/2022 08:23 AM    WBC 10 33 (H) 11/25/2022 12:24 " "AM    WBC 8 82 10/16/2022 03:16 PM    WBC 10 67 (H) 10/11/2022 10:41 PM    Hemoglobin 10 0 (L) 11/25/2022 12:24 AM    Hemoglobin 9 5 (L) 10/16/2022 03:16 PM    Hemoglobin 10 3 (L) 10/11/2022 10:41 PM    Hematocrit 33 0 (L) 11/25/2022 12:24 AM    Hematocrit 32 3 (L) 10/16/2022 03:16 PM    Hematocrit 34 2 (L) 10/11/2022 10:41 PM    MCV 73 (L) 11/25/2022 12:24 AM    MCV 72 (L) 10/16/2022 03:16 PM    MCV 71 (L) 10/11/2022 10:41 PM    Platelets 278 07/61/9124 12:24 AM    Platelets 435 96/67/5883 03:16 PM    Platelets 875 67/01/7795 10:41 PM    BUN 8 11/25/2022 12:24 AM    BUN 6 10/16/2022 03:16 PM    BUN 7 10/11/2022 10:41 PM    Potassium 3 7 11/25/2022 12:24 AM    Potassium 3 4 (L) 10/16/2022 03:16 PM    Potassium 3 6 10/11/2022 10:41 PM    Chloride 104 11/25/2022 12:24 AM    Chloride 104 10/16/2022 03:16 PM    Chloride 101 10/11/2022 10:41 PM    CO2 25 11/25/2022 12:24 AM    CO2 23 10/16/2022 03:16 PM    CO2 22 10/11/2022 10:41 PM    Creatinine 0 51 (L) 11/25/2022 12:24 AM    Creatinine 0 47 (L) 10/16/2022 03:16 PM    Creatinine 0 47 (L) 10/11/2022 10:41 PM     (H) 10/16/2022 03:16 PM     (H) 10/11/2022 10:41 PM     (H) 10/16/2022 03:16 PM     (H) 10/11/2022 10:41 PM    Albumin 3 7 10/16/2022 03:16 PM    Albumin 4 1 10/11/2022 10:41 PM           Imaging Studies: I have personally reviewed pertinent reports  Portions of the record may have been created with voice recognition software  Occasional wrong word or \"sound a like\" substitutions may have occurred due to the inherent limitations of voice recognition software  Read the chart carefully and recognize, using context, where substitutions have occurred    "

## 2023-05-02 ENCOUNTER — APPOINTMENT (OUTPATIENT)
Dept: LAB | Facility: CLINIC | Age: 23
End: 2023-05-02

## 2023-05-02 DIAGNOSIS — E66.01 MORBID OBESITY WITH BMI OF 50.0-59.9, ADULT (HCC): ICD-10-CM

## 2023-05-02 DIAGNOSIS — R79.89 ABNORMAL TSH: ICD-10-CM

## 2023-05-02 DIAGNOSIS — E55.9 VITAMIN D DEFICIENCY: ICD-10-CM

## 2023-05-02 DIAGNOSIS — O99.211 OBESITY AFFECTING PREGNANCY IN FIRST TRIMESTER: ICD-10-CM

## 2023-05-02 DIAGNOSIS — E11.65 UNCONTROLLED TYPE 2 DIABETES MELLITUS WITH HYPERGLYCEMIA (HCC): ICD-10-CM

## 2023-05-02 DIAGNOSIS — Z3A.10 10 WEEKS GESTATION OF PREGNANCY: ICD-10-CM

## 2023-05-02 DIAGNOSIS — E78.1 HYPERTRIGLYCERIDEMIA: ICD-10-CM

## 2023-05-02 LAB
25(OH)D3 SERPL-MCNC: 10.5 NG/ML (ref 30–100)
ABO GROUP BLD: NORMAL
ALBUMIN SERPL BCP-MCNC: 3.6 G/DL (ref 3.5–5)
ALP SERPL-CCNC: 117 U/L (ref 46–116)
ALT SERPL W P-5'-P-CCNC: 81 U/L (ref 12–78)
ANION GAP SERPL CALCULATED.3IONS-SCNC: 3 MMOL/L (ref 4–13)
AST SERPL W P-5'-P-CCNC: 36 U/L (ref 5–45)
BASOPHILS # BLD AUTO: 0.03 THOUSANDS/ΜL (ref 0–0.1)
BASOPHILS NFR BLD AUTO: 0 % (ref 0–1)
BILIRUB SERPL-MCNC: 0.21 MG/DL (ref 0.2–1)
BLD GP AB SCN SERPL QL: NEGATIVE
BUN SERPL-MCNC: 5 MG/DL (ref 5–25)
CALCIUM SERPL-MCNC: 9.1 MG/DL (ref 8.3–10.1)
CHLORIDE SERPL-SCNC: 105 MMOL/L (ref 96–108)
CHOLEST SERPL-MCNC: 177 MG/DL
CO2 SERPL-SCNC: 27 MMOL/L (ref 21–32)
CREAT SERPL-MCNC: 0.55 MG/DL (ref 0.6–1.3)
EOSINOPHIL # BLD AUTO: 0.23 THOUSAND/ΜL (ref 0–0.61)
EOSINOPHIL NFR BLD AUTO: 2 % (ref 0–6)
ERYTHROCYTE [DISTWIDTH] IN BLOOD BY AUTOMATED COUNT: 17.3 % (ref 11.6–15.1)
GFR SERPL CREATININE-BSD FRML MDRD: 132 ML/MIN/1.73SQ M
GLUCOSE P FAST SERPL-MCNC: 189 MG/DL (ref 65–99)
HBV SURFACE AG SER QL: NORMAL
HCT VFR BLD AUTO: 36.2 % (ref 34.8–46.1)
HCV AB SER QL: NORMAL
HDLC SERPL-MCNC: 29 MG/DL
HGB BLD-MCNC: 10.3 G/DL (ref 11.5–15.4)
HIV 1+2 AB+HIV1 P24 AG SERPL QL IA: NORMAL
HIV 2 AB SERPL QL IA: NORMAL
HIV1 AB SERPL QL IA: NORMAL
HIV1 P24 AG SERPL QL IA: NORMAL
IMM GRANULOCYTES # BLD AUTO: 0.02 THOUSAND/UL (ref 0–0.2)
IMM GRANULOCYTES NFR BLD AUTO: 0 % (ref 0–2)
LDLC SERPL CALC-MCNC: 80 MG/DL (ref 0–100)
LYMPHOCYTES # BLD AUTO: 3.91 THOUSANDS/ΜL (ref 0.6–4.47)
LYMPHOCYTES NFR BLD AUTO: 39 % (ref 14–44)
MCH RBC QN AUTO: 20 PG (ref 26.8–34.3)
MCHC RBC AUTO-ENTMCNC: 28.5 G/DL (ref 31.4–37.4)
MCV RBC AUTO: 70 FL (ref 82–98)
MONOCYTES # BLD AUTO: 0.36 THOUSAND/ΜL (ref 0.17–1.22)
MONOCYTES NFR BLD AUTO: 4 % (ref 4–12)
NEUTROPHILS # BLD AUTO: 5.41 THOUSANDS/ΜL (ref 1.85–7.62)
NEUTS SEG NFR BLD AUTO: 55 % (ref 43–75)
NONHDLC SERPL-MCNC: 148 MG/DL
NRBC BLD AUTO-RTO: 0 /100 WBCS
PLATELET # BLD AUTO: 322 THOUSANDS/UL (ref 149–390)
PMV BLD AUTO: 12.1 FL (ref 8.9–12.7)
POTASSIUM SERPL-SCNC: 3.6 MMOL/L (ref 3.5–5.3)
PROT SERPL-MCNC: 8.5 G/DL (ref 6.4–8.4)
RBC # BLD AUTO: 5.15 MILLION/UL (ref 3.81–5.12)
RH BLD: NEGATIVE
RUBV IGG SERPL IA-ACNC: 99.2 IU/ML
SODIUM SERPL-SCNC: 135 MMOL/L (ref 135–147)
SPECIMEN EXPIRATION DATE: NORMAL
T4 FREE SERPL-MCNC: 1.04 NG/DL (ref 0.76–1.46)
TREPONEMA PALLIDUM IGG+IGM AB [PRESENCE] IN SERUM OR PLASMA BY IMMUNOASSAY: NORMAL
TRIGL SERPL-MCNC: 339 MG/DL
TSH SERPL DL<=0.05 MIU/L-ACNC: 4.37 UIU/ML (ref 0.45–4.5)
VZV IGG SER QL IA: NORMAL
WBC # BLD AUTO: 9.96 THOUSAND/UL (ref 4.31–10.16)

## 2023-05-03 LAB
BACTERIA UR CULT: NORMAL
THYROGLOB AB SERPL-ACNC: <1 IU/ML (ref 0–0.9)
THYROPEROXIDASE AB SERPL-ACNC: <9 IU/ML (ref 0–34)

## 2023-05-04 ENCOUNTER — TELEPHONE (OUTPATIENT)
Dept: ENDOCRINOLOGY | Facility: CLINIC | Age: 23
End: 2023-05-04

## 2023-05-04 DIAGNOSIS — E03.9 ACQUIRED HYPOTHYROIDISM: Primary | ICD-10-CM

## 2023-05-04 RX ORDER — LEVOTHYROXINE SODIUM 0.03 MG/1
25 TABLET ORAL DAILY
COMMUNITY
End: 2023-05-04 | Stop reason: SDUPTHER

## 2023-05-04 RX ORDER — LEVOTHYROXINE SODIUM 0.03 MG/1
25 TABLET ORAL DAILY
Qty: 90 TABLET | Refills: 1 | Status: SHIPPED | OUTPATIENT
Start: 2023-05-04 | End: 2023-10-31

## 2023-05-04 NOTE — TELEPHONE ENCOUNTER
Lab Results   Component Value Date    [id number] 4 370 05/02/2023     Please inform patient that her TSH is elevated, will start levothyroxine 25 mcg daily, 1 hour before breakfast   Please make prescription ready, will need repeat TSH and free T4 in 6 weeks please order blood work    Thanks     [employer]

## 2023-05-04 NOTE — TELEPHONE ENCOUNTER
Pt called back  Notified of Dr Mikal Messina message  Pt understood  Rx sent to pharmacy & lab order mailed

## 2023-05-05 LAB
HGB A MFR BLD: 1.9 % (ref 1.8–3.2)
HGB A MFR BLD: 98.1 % (ref 96.4–98.8)
HGB F MFR BLD: 0 % (ref 0–2)
HGB FRACT BLD-IMP: NORMAL
HGB S MFR BLD: 0 %

## 2023-05-08 ENCOUNTER — OFFICE VISIT (OUTPATIENT)
Dept: FAMILY MEDICINE CLINIC | Facility: CLINIC | Age: 23
End: 2023-05-08

## 2023-05-08 VITALS
RESPIRATION RATE: 18 BRPM | DIASTOLIC BLOOD PRESSURE: 82 MMHG | BODY MASS INDEX: 57.32 KG/M2 | WEIGHT: 293 LBS | OXYGEN SATURATION: 98 % | SYSTOLIC BLOOD PRESSURE: 134 MMHG | TEMPERATURE: 98.4 F | HEART RATE: 81 BPM

## 2023-05-08 DIAGNOSIS — E66.01 MORBID OBESITY WITH BMI OF 50.0-59.9, ADULT (HCC): ICD-10-CM

## 2023-05-08 DIAGNOSIS — D50.0 IRON DEFICIENCY ANEMIA DUE TO CHRONIC BLOOD LOSS: ICD-10-CM

## 2023-05-08 DIAGNOSIS — E55.9 VITAMIN D DEFICIENCY: ICD-10-CM

## 2023-05-08 DIAGNOSIS — R79.89 ELEVATED LFTS: ICD-10-CM

## 2023-05-08 DIAGNOSIS — E78.1 HYPERTRIGLYCERIDEMIA: ICD-10-CM

## 2023-05-08 DIAGNOSIS — E11.65 UNCONTROLLED TYPE 2 DIABETES MELLITUS WITH HYPERGLYCEMIA (HCC): Primary | ICD-10-CM

## 2023-05-08 NOTE — PROGRESS NOTES
FAMILY PRACTICE OFFICE VISIT       NAME: Cameron Pappas  AGE: 21 y o  SEX: female       : 2000        MRN: [id number]    Assessment and Plan   1  Uncontrolled type 2 diabetes mellitus with hyperglycemia [facility])  Assessment & Plan:    Lab Results   Component Value Date    HGBA1C 8 1 (H) 2023     A1c 8 1%, above goal    Given her age, I would like to see A1c <6 0%  She will be starting GLP-1 in addition to metformin and lantus  I did speak at length with her about GLP-1 drug class is not recommended if pregnant or planning to become pregnant, there is not extensive human data, but there is risk for developmental abnormalities, fetal anomalies  She is currently not taking any birth control precautions  Not planning pregnancy  I strongly recommend she take measures to prevent pregnancy if she starts GLP-1  She verbalizes understanding  Continue follow up with endocrinology  Orders:  -     Hemoglobin A1C; Future    2  Elevated LFTs  Assessment & Plan:  2022 LFT's--, , Alk phos 84  As of May 2nd blood work LFT's AST 36, ALT 81, Alk Phos 117  Notes she stopped drinking all alcohol  Congratulated on this, and encouraged to continue with complete alcohol cessation  Will monitor  Orders:  -     Comprehensive metabolic panel; Future    3  Hypertriglyceridemia  Assessment & Plan:  Lipid panel currently with total cholesterol 177, triglycerides 339, HDL 29, LDL 80  Triglycerides run 170's-340's  Hoping with lifestyle changes and starting GLP-1 for diabetes and weight loss, triglycerides will come down  Continue to monitor for now  4  Iron deficiency anemia due to chronic blood loss  Assessment & Plan:  H&H 10 3/36 2  Takes iron when she remembers  Just purchased pill boxes, to help her remember to take medications regularly  Orders:  -     CBC; Future    5   Morbid obesity with BMI of 50 0-59 9, adult [facility])  Assessment & Plan:  Planning to start GLP-1 as per endocrinology for diabetes and weight loss  She is waiting to hear on insurance coverage  She is working on [employer] and being more active  6  Vitamin D deficiency  Assessment & Plan:  Vitamin D 25 hydroxy  She is not taking vitamin supplement very often right now  She will start vitamin D 2000 units daily  Just purchased pill boxes to help her remember daily medications  Orders:  -     Vitamin D 25 hydroxy; Future       Follow up in 3-4 months  Chief Complaint     Chief Complaint   Patient presents with   • Blood Pressure Check       History of Present Illness     Cherelle Brain is a 21year old female presenting today for follow up  She is feeling well  Has no complaints today  Review of Systems   Review of Systems   Constitutional: Negative  HENT: Negative  Respiratory: Negative  Cardiovascular: Negative  Gastrointestinal: Negative  Genitourinary: Negative  Musculoskeletal: Positive for back pain (Back spasm last week, relived with muscle relaxant, prior prescription  )  Skin: Negative  Neurological: Negative  Hematological: Negative  Psychiatric/Behavioral: Negative  I have reviewed the patient's medical history in detail; there are no changes to the history as noted in the electronic medical record  Objective     Vitals:    05/08/23 0757 05/08/23 0822   BP: 136/90 134/82   Pulse: 81    Resp: 18    Temp: 98 4 °F (36 9 °C)    TempSrc: Temporal    SpO2: 98%    Weight: (!) 147 kg (323 lb 9 6 oz)      Wt Readings from Last 3 Encounters:   05/08/23 (!) 147 kg (323 lb 9 6 oz)   05/01/23 (!) 148 kg (326 lb 3 2 oz)   02/24/23 (!) 145 kg (320 lb)     Physical Exam  Vitals and nursing note reviewed  Constitutional:       Appearance: Normal appearance  She is obese  She is not ill-appearing  Cardiovascular:      Rate and Rhythm: Normal rate and regular rhythm  Heart sounds: No murmur heard    Pulmonary:      Effort: Pulmonary effort is normal  No respiratory distress  Breath sounds: Normal breath sounds  Musculoskeletal:      Right lower leg: No edema  Left lower leg: No edema  Skin:     Findings: No rash  Neurological:      Mental Status: She is alert  Psychiatric:         Mood and Affect: Mood normal             ALLERGIES:  No Known Allergies    Current Medications     Current Outpatient Medications   Medication Sig Dispense Refill   • Blood Glucose Monitoring Suppl (OneTouch Verio Reflect) w/Device KIT Use 1 kit 2 (two) times a day E11 65, Please substitute with appropriate alternative as covered by patient's insurance  1 kit 0   • Cholecalciferol (Vitamin D-3) 25 MCG (1000 UT) CAPS Take 2 capsules (2,000 Units total) by mouth daily Take with dietary calcium  180 capsule 3   • ferrous sulfate 325 (65 Fe) mg tablet Take 1 tablet (325 mg total) by mouth daily with breakfast 90 tablet 3   • glucose blood (OneTouch Verio) test strip Check blood glucose 4 times daily, fasting and 2 hours after meals  300 strip 6   • Insulin Glargine Solostar (Lantus SoloStar) 100 UNIT/ML SOPN Inject 20 units at bedtime 45 mL 1   • Insulin Pen Needle (BD Pen Needle Nayeli U/F) 32G X 4 MM MISC Use once daily 150 each 1   • levothyroxine 25 mcg tablet Take 1 tablet (25 mcg total) by mouth daily 90 tablet 1   • metFORMIN (GLUCOPHAGE-XR) 500 mg 24 hr tablet Take 1 tablet (500 mg total) by mouth daily with dinner 30 tablet 5   • OneTouch Delica Lancets 90I MISC Use 2 (two) times a day E11 65, Please substitute with appropriate alternative as covered by patient's insurance  100 each 0   • hydrocortisone 1 % ointment Apply 1 g (1 application total) topically 2 (two) times a day for 7 days (Patient not taking: Reported on 5/1/2023) 14 g 0   • Insulin Pen Needle 31G X 5 MM MISC Inject under the skin daily at bedtime Use 5 a day or as directed   (Patient not taking: Reported on 5/1/2023) 100 each 0   • methylergonovine (Methergine) 0 2 mg tablet Take 1 tablet (0 2 mg total) by mouth every 6 (six) hours for 3 days (Patient not taking: Reported on 5/1/2023) 12 tablet 0   • Trulicity 3 86 ZS/3 8QV injection Inject 0 5 mL (0 75 mg total) under the skin every 7 days 2 mL 1     No current facility-administered medications for this visit           Health Maintenance     Health Maintenance   Topic Date Due   • PT PLAN OF CARE  Never done   • Hepatitis A Vaccine (2 of 2 - 2-dose series) 01/26/2018   • Pneumococcal Vaccine: Pediatrics (0 to 5 Years) and At-Risk Patients (6 to 59 Years) (2 - PCV) 02/04/2021   • Kidney Health Evaluation: Microalbumin/Creatinine Ratio  06/18/2021   • COVID-19 Vaccine (3 - Booster for Pfizer series) 07/16/2021   • DTaP,Tdap,and Td Vaccines (7 - Td or Tdap) 11/17/2021   • Chlamydia Screening  04/07/2022   • Annual Physical  11/12/2022   • HEMOGLOBIN A1C  08/02/2023   • Diabetic Foot Exam  08/23/2023   • Depression Screening  11/21/2023   • BMI: Followup Plan  12/04/2023   • Kidney Health Evaluation: GFR  05/02/2024   • BMI: Adult  05/08/2024   • DM Eye Exam  08/11/2024   • HIV Screening  Completed   • Hepatitis C Screening  Completed   • HIB Vaccine  Completed   • IPV Vaccine  Completed   • Meningococcal ACWY Vaccine  Completed   • Influenza Vaccine  Completed   • HPV Vaccine  Completed     Immunization History   Administered Date(s) Administered   • COVID-19 PFIZER VACCINE 0 3 ML IM 04/29/2021, 05/21/2021   • DTP 2000, 2000, 2000, 05/31/2001, 03/23/2005   • H1N1, All Formulations 10/27/2009   • HPV Quadrivalent 06/28/2011, 11/17/2011, 11/19/2012   • Hep A, ped/adol, 2 dose 07/26/2017   • Hep B, Adolescent or Pediatric 2000, 2000, 05/31/2001   • Hep B, adult 2000, 2000, 05/31/2001   • Hepatitis A 07/26/2017   • HiB 2000, 2000, 2000, 10/29/2001, 10/29/2011   • Hib (PRP-OMP) 2000, 2000, 2000, 10/29/2011   • INFLUENZA 11/17/2011, 11/19/2012, 11/26/2013, 12/01/2022   • IPV 2000, 2000, 05/23/2001, 03/23/2005   • Influenza Quadrivalent Preservative Free 3 years and older IM 01/04/2016   • Influenza, injectable, quadrivalent, preservative free 0 5 mL 12/01/2022   • Influenza, recombinant, quadrivalent,injectable, preservative free 11/07/2019   • Influenza, seasonal, injectable 11/17/2011, 11/19/2012, 11/26/2013, 11/18/2014   • MMR 05/31/2001, 03/23/2005   • Meningococcal B, OMV (BEXSERO) 04/26/2019   • Meningococcal MCV4P 11/17/2011, 07/26/2017   • Meningococcal, Unknown Serogroups 11/17/2011, 07/26/2017   • Pneumococcal Conjugate PCV 7 2000, 2000, 2000, 05/31/2001   • Pneumococcal Polysaccharide PPV23 02/04/2020   • Rho (D) Immune Globulin 11/25/2022   • Tdap 2000, 11/07/2003, 11/17/2011   • Tuberculin Skin Test-PPD Intradermal 11/07/2003, 02/24/2021   • Varicella 10/29/2001, 08/21/2008       KELLY Schuler

## 2023-05-09 ENCOUNTER — TELEPHONE (OUTPATIENT)
Dept: ENDOCRINOLOGY | Facility: CLINIC | Age: 23
End: 2023-05-09

## 2023-05-09 NOTE — TELEPHONE ENCOUNTER
Patient called because pharmacy said medication Ozempic needed a prior authorization  No Faxed from pharmacy

## 2023-05-10 DIAGNOSIS — E11.65 UNCONTROLLED TYPE 2 DIABETES MELLITUS WITH HYPERGLYCEMIA (HCC): Primary | ICD-10-CM

## 2023-05-10 RX ORDER — DULAGLUTIDE 0.75 MG/.5ML
0.75 INJECTION, SOLUTION SUBCUTANEOUS
Qty: 2 ML | Refills: 1 | Status: SHIPPED | OUTPATIENT
Start: 2023-05-10

## 2023-05-10 RX ORDER — DULAGLUTIDE 0.75 MG/.5ML
0.75 INJECTION, SOLUTION SUBCUTANEOUS
COMMUNITY
End: 2023-05-10 | Stop reason: SDUPTHER

## 2023-05-10 NOTE — TELEPHONE ENCOUNTER
Please inform patient that Trulicity is covered and make prescription ready for Trulicity 3 89 mg once a week  We will also need appointment for education for injection teaching    Thanks    [email or web address]

## 2023-05-10 NOTE — TELEPHONE ENCOUNTER
Denied  Reason for denial:  Pt must try and fail both formulary alternatives; Trulicity and Victoza  Please review

## 2023-05-10 NOTE — TELEPHONE ENCOUNTER
Called and notified pt of change  Pt was previously on Trulicity, so she knows how to use the pen  Med list updated and new Rx sent

## 2023-05-13 NOTE — ASSESSMENT & PLAN NOTE
Planning to start GLP-1 as per endocrinology for diabetes and weight loss  She is waiting to hear on insurance coverage  She is working on [employer] and being more active

## 2023-05-13 NOTE — ASSESSMENT & PLAN NOTE
October 2022 LFT's--, , Alk phos 84  As of May 2nd blood work LFT's AST 36, ALT 81, Alk Phos 117  Notes she stopped drinking all alcohol  Congratulated on this, and encouraged to continue with complete alcohol cessation  Will monitor

## 2023-05-13 NOTE — ASSESSMENT & PLAN NOTE
Vitamin D 25 hydroxy  She is not taking vitamin supplement very often right now  She will start vitamin D 2000 units daily  Just purchased pill boxes to help her remember daily medications

## 2023-05-13 NOTE — ASSESSMENT & PLAN NOTE
Lipid panel currently with total cholesterol 177, triglycerides 339, HDL 29, LDL 80  Triglycerides run 170's-340's  Hoping with lifestyle changes and starting GLP-1 for diabetes and weight loss, triglycerides will come down  Continue to monitor for now

## 2023-05-13 NOTE — ASSESSMENT & PLAN NOTE
Lab Results   Component Value Date    HGBA1C 8 1 (H) 05/02/2023     A1c 8 1%, above goal    Given her age, I would like to see A1c <6 0%  She will be starting GLP-1 in addition to metformin and lantus  I did speak at length with her about GLP-1 drug class is not recommended if pregnant or planning to become pregnant, there is not extensive human data, but there is risk for developmental abnormalities, fetal anomalies  She is currently not taking any birth control precautions  Not planning pregnancy  I strongly recommend she take measures to prevent pregnancy if she starts GLP-1  She verbalizes understanding  Continue follow up with endocrinology

## 2023-05-13 NOTE — ASSESSMENT & PLAN NOTE
H&H 10 3/36 2  Takes iron when she remembers  Just purchased pill boxes, to help her remember to take medications regularly

## 2023-06-28 ENCOUNTER — DOCUMENTATION (OUTPATIENT)
Dept: ENDOCRINOLOGY | Facility: CLINIC | Age: 23
End: 2023-06-28

## 2023-06-28 ENCOUNTER — TELEPHONE (OUTPATIENT)
Dept: ENDOCRINOLOGY | Facility: CLINIC | Age: 23
End: 2023-06-28

## 2023-06-28 NOTE — TELEPHONE ENCOUNTER
Fasting blood sugars are 150 to 180 mg per DL range  Please inform patient to increase Lantus to 24 units at bedtime, continue Trulicity and metformin  Goal for fasting blood sugar is  mg/dl     Twan Arango

## 2023-07-13 ENCOUNTER — TELEPHONE (OUTPATIENT)
Dept: ENDOCRINOLOGY | Facility: CLINIC | Age: 23
End: 2023-07-13

## 2023-07-13 DIAGNOSIS — E11.65 UNCONTROLLED TYPE 2 DIABETES MELLITUS WITH HYPERGLYCEMIA (HCC): ICD-10-CM

## 2023-07-13 RX ORDER — DULAGLUTIDE 0.75 MG/.5ML
0.75 INJECTION, SOLUTION SUBCUTANEOUS
Qty: 2 ML | Refills: 1 | Status: SHIPPED | OUTPATIENT
Start: 2023-07-13

## 2023-07-13 RX ORDER — DULAGLUTIDE 0.75 MG/.5ML
0.75 INJECTION, SOLUTION SUBCUTANEOUS
Refills: 1 | OUTPATIENT
Start: 2023-07-13

## 2023-07-14 NOTE — TELEPHONE ENCOUNTER
Received insurance response stating:  PA request was dismissed b/c the med is on formulary and falls within the plan dosing limits. An authorization is not needed.

## 2023-07-25 ENCOUNTER — ANNUAL EXAM (OUTPATIENT)
Dept: OBGYN CLINIC | Facility: CLINIC | Age: 23
End: 2023-07-25
Payer: MEDICARE

## 2023-07-25 VITALS
SYSTOLIC BLOOD PRESSURE: 136 MMHG | HEIGHT: 63 IN | DIASTOLIC BLOOD PRESSURE: 80 MMHG | BODY MASS INDEX: 51.91 KG/M2 | WEIGHT: 293 LBS

## 2023-07-25 DIAGNOSIS — Z11.3 ROUTINE SCREENING FOR STI (SEXUALLY TRANSMITTED INFECTION): ICD-10-CM

## 2023-07-25 DIAGNOSIS — N91.2 AMENORRHEA: ICD-10-CM

## 2023-07-25 DIAGNOSIS — Z12.4 ENCOUNTER FOR SCREENING FOR MALIGNANT NEOPLASM OF CERVIX: ICD-10-CM

## 2023-07-25 DIAGNOSIS — Z01.419 WELL WOMAN EXAM WITH ROUTINE GYNECOLOGICAL EXAM: Primary | ICD-10-CM

## 2023-07-25 DIAGNOSIS — R30.0 DYSURIA: ICD-10-CM

## 2023-07-25 LAB — SL AMB POCT URINE HCG: NEGATIVE

## 2023-07-25 PROCEDURE — 87491 CHLMYD TRACH DNA AMP PROBE: CPT | Performed by: OBSTETRICS & GYNECOLOGY

## 2023-07-25 PROCEDURE — 99395 PREV VISIT EST AGE 18-39: CPT | Performed by: OBSTETRICS & GYNECOLOGY

## 2023-07-25 PROCEDURE — 87591 N.GONORRHOEAE DNA AMP PROB: CPT | Performed by: OBSTETRICS & GYNECOLOGY

## 2023-07-25 PROCEDURE — 81025 URINE PREGNANCY TEST: CPT | Performed by: OBSTETRICS & GYNECOLOGY

## 2023-07-25 PROCEDURE — G0145 SCR C/V CYTO,THINLAYER,RESCR: HCPCS | Performed by: OBSTETRICS & GYNECOLOGY

## 2023-07-25 NOTE — PROGRESS NOTES
ASSESSMENT & PLAN:   Diagnoses and all orders for this visit:    Well woman exam with routine gynecological exam  -     Liquid-based pap, screening    Encounter for screening for malignant neoplasm of cervix  -     Liquid-based pap, screening    Amenorrhea  -     POCT urine HCG    Dysuria  -     Urine culture; Future    Routine screening for STI (sexually transmitted infection)  -     HIV 1/2 AG/AB w Reflex [facility] for 2 yr old and above; Future  -     RPR-Syphilis Screening (Total Syphilis IGG/IGM); Future  -     Hepatitis B surface antigen; Future  -     Hepatitis C antibody; Future  -     Chlamydia/GC amplified DNA by PCR          The following were reviewed in today's visit: ASCCP guidelines, Gardisil vaccination, STD testing breast self exam, family planning choices, exercise and healthy diet. Patient to return to office in yearly for annual exam.     All questions have been answered to her satisfaction. CC:  Annual Gynecologic Examination  Chief Complaint   Patient presents with   • Gynecologic Exam     Pap 4/7/2021 ASCUS       HPI: Shelia Luna is a 21 y.o. Farhana Karma who presents for annual gynecologic examination.   She has the following concerns:  occ buring with urination, would like STI screening      Health Maintenance:    Exercise: intermittently  Breast exams/breast awareness: yes  Diet: well balanced diet      Past Medical History:   Diagnosis Date   • Anxiety    • Asthma     childhood   • Chronic headache    • Current moderate episode of major depressive disorder ([address]) 09/29/2015   • Dextroscoliosis 04/05/2016   • Diabetes mellitus ([address])     2019   • Elevated LFTs    • Migraine without aura and without status migrainosus, not intractable 08/16/2019   • PCOS (polycystic ovarian syndrome)    • Plantar fasciitis 06/18/2020   • Pre-diabetes    • Self-injurious behavior     when 15 y/o   • Varicella     vaccinated       Past Surgical History:   Procedure Laterality Date   • BREAST CYST INCISION AND DRAINAGE Right 2017    Procedure: INCISION AND DRAINAGE (I&D) BREAST;  Surgeon: Maurice El DO;  Location: [facility];  Service: General   • ND TX MISSED  FIRST TRIMESTER SURGICAL N/A 2022    Procedure: DILATATION AND EVACUATION (D&E) (# OF WEEKS) 8 weeks;  Surgeon: Charlotte Cruz MD;  Location: [facility];  Service: Gynecology   • TONSILLECTOMY         Past OB/Gyn History:  Period Cycle (Days): 28  Period Duration (Days): 7  Period Pattern: Regular  Menstrual Flow: Moderate  Dysmenorrhea: (!) Mild  Dysmenorrhea Symptoms: Cramping, Nausea, Diarrhea, HeadachePatient's last menstrual period was 2023. Last Pap:  : no abnormalities  History of abnormal Pap smear: yes - ASCUS  HPV vaccine completed: yes    Patient is currently sexually active.    STD testing: yes  Current contraception: condoms      Family History  Family History   Problem Relation Age of Onset   • Hypertension Mother    • Hyperlipidemia Mother    • Diabetes Mother    • Depression Mother    • Thyroid disease Father    • Thyroid disease Sister    • Anemia Sister    • Depression Sister    • Anxiety disorder Sister    • Anemia Sister    • Anemia Sister    • Kidney disease Sister    • Appendicitis Maternal Grandmother    • Aneurysm Paternal Grandfather    • Prostate cancer Paternal Uncle    • Obesity Family    • Allergies Family    • Diabetes Family    • Substance Abuse Neg Hx    • Mental illness Neg Hx    • Stroke Neg Hx        Family history of uterine or ovarian cancer: no  Family history of breast cancer: no  Family history of colon cancer: no    Social History:  Social History     Socioeconomic History   • Marital status: Single     Spouse name: Not on file   • Number of children: Not on file   • Years of education: Not on file   • Highest education level: Not on file   Occupational History   • Occupation: unemployed   Tobacco Use   • Smoking status: Never   • Smokeless tobacco: Never   Vaping Use   • Vaping Use: Never used   Substance and Sexual Activity   • Alcohol use: Yes     Comment: OCCASIONAL/SOCIAL   • Drug use: Not Currently     Types: Marijuana   • Sexual activity: Yes     Partners: Male     Birth control/protection: None   Other Topics Concern   • Not on file   Social History Narrative    LIVES AT HOME WITH MOM AND DAD     Social Determinants of Health     Financial Resource Strain: Not on file   Food Insecurity: No Food Insecurity (4/22/2021)    Hunger Vital Sign    • Worried About Running Out of Food in the Last Year: Never true    • Ran Out of Food in the Last Year: Never true   Transportation Needs: No Transportation Needs (4/22/2021)    PRAPARE - Transportation    • Lack of Transportation (Medical): No    • Lack of Transportation (Non-Medical):  No   Physical Activity: Inactive (11/7/2019)    Exercise Vital Sign    • Days of Exercise per Week: 0 days    • Minutes of Exercise per Session: 0 min   Stress: No Stress Concern Present (11/7/2019)    [facility]    • Feeling of Stress : Not at all   Social Connections: Unknown (11/7/2019)    Social Connection and Isolation Panel [NHANES]    • Frequency of Communication with Friends and Family: Patient refused    • Frequency of Social Gatherings with Friends and Family: Patient refused    • Attends Congregation Services: Patient refused    • Active Member of Clubs or Organizations: Patient refused    • Attends Club or Organization Meetings: Patient refused    • Marital Status: Patient refused   Intimate Partner Violence: Unknown (11/7/2019)    Humiliation, Afraid, Rape, and Kick questionnaire    • Fear of Current or Ex-Partner: Patient refused    • Emotionally Abused: Patient refused    • Physically Abused: Patient refused    • Sexually Abused: Patient refused   Housing Stability: Not on file     Domestic violence screen: negative    Allergies:  No Known Allergies    Medications:    Current Outpatient Medications:   •  Blood Glucose Monitoring Suppl (OneTouch Verio Reflect) w/Device KIT, Use 1 kit 2 (two) times a day E11.65, Please substitute with appropriate alternative as covered by patient's insurance., Disp: 1 kit, Rfl: 0  •  ferrous sulfate 325 (65 Fe) mg tablet, Take 1 tablet (325 mg total) by mouth daily with breakfast, Disp: 90 tablet, Rfl: 3  •  glucose blood (OneTouch Verio) test strip, Check blood glucose 4 times daily, fasting and 2 hours after meals. , Disp: 300 strip, Rfl: 6  •  Insulin Pen Needle (BD Pen Needle Nayeli U/F) 32G X 4 MM MISC, Use once daily, Disp: 150 each, Rfl: 1  •  levothyroxine 25 mcg tablet, Take 1 tablet (25 mcg total) by mouth daily, Disp: 90 tablet, Rfl: 1  •  metFORMIN (GLUCOPHAGE-XR) 500 mg 24 hr tablet, Take 1 tablet (500 mg total) by mouth daily with dinner, Disp: 30 tablet, Rfl: 5  •  OneTouch Delica Lancets 46B MISC, Use 2 (two) times a day E11.65, Please substitute with appropriate alternative as covered by patient's insurance., Disp: 100 each, Rfl: 0  •  Trulicity 4.79 XQ/5.2VD injection, INJECT 0.5 ML (0.75 MG TOTAL) UNDER THE SKIN EVERY 7 DAYS, Disp: 2 mL, Rfl: 1    Review of Systems:  Review of Systems   Constitutional: Negative. HENT: Negative. Respiratory: Negative. Cardiovascular: Negative. Gastrointestinal: Negative. Genitourinary: Positive for dysuria. Neurological: Negative. Psychiatric/Behavioral: Negative. Physical Exam:  /80 (BP Location: Right arm, Patient Position: Sitting, Cuff Size: Standard)   Ht 5' 3" (1.6 m)   Wt (!) 148 kg (325 lb 9.6 oz)   LMP 05/31/2023   BMI 57.68 kg/m²    Physical Exam  Constitutional:       Appearance: Normal appearance. Genitourinary:      Bladder and urethral meatus normal.      No lesions in the vagina. Right Labia: No rash, tenderness, lesions, skin changes or Bartholin's cyst.     Left Labia: No tenderness, lesions, skin changes, Bartholin's cyst or rash.      No vaginal erythema, tenderness or bleeding. Right Adnexa: not tender, not full and no mass present. Left Adnexa: not tender, not full and no mass present. Cervix is parous. No cervical motion tenderness, discharge, lesion or polyp. Urethral meatus caruncle not present. No urethral tenderness or mass present. HENT:      Head: Normocephalic and atraumatic. Eyes:      Extraocular Movements: Extraocular movements intact. Conjunctiva/sclera: Conjunctivae normal.      Pupils: Pupils are equal, round, and reactive to light. Cardiovascular:      Rate and Rhythm: Normal rate and regular rhythm. Heart sounds: Normal heart sounds. No murmur heard. Pulmonary:      Effort: Pulmonary effort is normal. No respiratory distress. Breath sounds: Normal breath sounds. No wheezing or rales. Abdominal:      General: There is no distension. Palpations: Abdomen is soft. Tenderness: There is no abdominal tenderness. There is no guarding. Neurological:      General: No focal deficit present. Mental Status: She is alert and oriented to person, place, and time. Skin:     General: Skin is warm and dry. Psychiatric:         Mood and Affect: Mood normal.         Behavior: Behavior normal.   Vitals and nursing note reviewed.

## 2023-07-26 NOTE — TELEPHONE ENCOUNTER
Left message at patients PCP office to request insurance referral for [facility], patients upcoming appointment on 2/6/2020  Provided fax/ gateway ID #  Female

## 2023-07-27 LAB
C TRACH DNA SPEC QL NAA+PROBE: NEGATIVE
N GONORRHOEA DNA SPEC QL NAA+PROBE: NEGATIVE

## 2023-07-28 LAB
LAB AP GYN PRIMARY INTERPRETATION: NORMAL
Lab: NORMAL

## 2023-10-09 ENCOUNTER — APPOINTMENT (OUTPATIENT)
Dept: URGENT CARE | Age: 23
End: 2023-10-09
Payer: MEDICARE

## 2023-10-09 ENCOUNTER — OFFICE VISIT (OUTPATIENT)
Dept: URGENT CARE | Facility: CLINIC | Age: 23
End: 2023-10-09
Payer: MEDICARE

## 2023-10-09 VITALS
TEMPERATURE: 97.9 F | DIASTOLIC BLOOD PRESSURE: 69 MMHG | RESPIRATION RATE: 20 BRPM | HEART RATE: 94 BPM | SYSTOLIC BLOOD PRESSURE: 129 MMHG

## 2023-10-09 DIAGNOSIS — L03.012 ACUTE PARONYCHIA OF FINGER OF LEFT HAND: Primary | ICD-10-CM

## 2023-10-09 PROCEDURE — 99213 OFFICE O/P EST LOW 20 MIN: CPT | Performed by: NURSE PRACTITIONER

## 2023-10-09 PROCEDURE — 10060 I&D ABSCESS SIMPLE/SINGLE: CPT | Performed by: NURSE PRACTITIONER

## 2023-10-09 RX ORDER — CEFDINIR 300 MG/1
300 CAPSULE ORAL EVERY 12 HOURS SCHEDULED
Qty: 14 CAPSULE | Refills: 0 | Status: SHIPPED | OUTPATIENT
Start: 2023-10-09 | End: 2023-10-16
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
## 2023-11-07 ENCOUNTER — TELEPHONE (OUTPATIENT)
Dept: PERINATAL CARE | Facility: CLINIC | Age: 23
End: 2023-11-07

## 2023-11-07 NOTE — TELEPHONE ENCOUNTER
Incoming Call from [facility] ([facility] medical reports that forms for CGM, continuous glucose monitor, supplies were faxed over on 11/3/23 and never completed/returned. Requesting forms be completed and sent back. ) and Diabetes Type 2    - After reviewing patient's chart, it was determined that this patient is no longer pregnant or under the care of [address] at this time. - Message routed to Endocrinologist to notify.      Nishant Starr RD  Diabetes Educator   [facility]  Diabetes and Pregnancy Program

## 2023-12-08 DIAGNOSIS — E03.9 ACQUIRED HYPOTHYROIDISM: ICD-10-CM

## 2023-12-08 RX ORDER — LEVOTHYROXINE SODIUM 0.03 MG/1
25 TABLET ORAL DAILY
Qty: 90 TABLET | Refills: 1 | Status: SHIPPED | OUTPATIENT
Start: 2023-12-08

## 2024-01-08 ENCOUNTER — OFFICE VISIT (OUTPATIENT)
Dept: URGENT CARE | Age: 24
End: 2024-01-08
Payer: MEDICARE

## 2024-01-08 VITALS
TEMPERATURE: 97 F | OXYGEN SATURATION: 99 % | RESPIRATION RATE: 20 BRPM | SYSTOLIC BLOOD PRESSURE: 140 MMHG | HEART RATE: 91 BPM | DIASTOLIC BLOOD PRESSURE: 89 MMHG

## 2024-01-08 DIAGNOSIS — S62.326A DISPLACED FRACTURE OF SHAFT OF FIFTH METACARPAL BONE, RIGHT HAND, INITIAL ENCOUNTER FOR CLOSED FRACTURE: Primary | ICD-10-CM

## 2024-01-08 DIAGNOSIS — S62.667A CLOSED NONDISPLACED FRACTURE OF DISTAL PHALANX OF LEFT LITTLE FINGER, INITIAL ENCOUNTER: ICD-10-CM

## 2024-01-08 PROCEDURE — 99213 OFFICE O/P EST LOW 20 MIN: CPT | Performed by: NURSE PRACTITIONER

## 2024-01-08 PROCEDURE — 29130 APPL FINGER SPLINT STATIC: CPT | Performed by: NURSE PRACTITIONER

## 2024-01-08 PROCEDURE — 29125 APPL SHORT ARM SPLINT STATIC: CPT | Performed by: NURSE PRACTITIONER
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
## 2024-01-09 ENCOUNTER — TELEPHONE (OUTPATIENT)
Age: 24
End: 2024-01-09

## 2024-01-09 NOTE — TELEPHONE ENCOUNTER
Patient is being referred to a orthopedics. Please schedule accordingly.    [facility]   [phone]

## 2024-01-15 ENCOUNTER — OFFICE VISIT (OUTPATIENT)
Dept: OBGYN CLINIC | Facility: MEDICAL CENTER | Age: 24
End: 2024-01-15
Payer: MEDICARE

## 2024-01-15 VITALS — WEIGHT: 293 LBS | BODY MASS INDEX: 51.91 KG/M2 | HEIGHT: 63 IN

## 2024-01-15 DIAGNOSIS — S62.326A DISPLACED FRACTURE OF SHAFT OF FIFTH METACARPAL BONE, RIGHT HAND, INITIAL ENCOUNTER FOR CLOSED FRACTURE: ICD-10-CM

## 2024-01-15 DIAGNOSIS — S62.637A CLOSED FRACTURE OF TUFT OF DISTAL PHALANX OF LEFT LITTLE FINGER: Primary | ICD-10-CM

## 2024-01-15 PROCEDURE — 99203 OFFICE O/P NEW LOW 30 MIN: CPT | Performed by: SURGERY
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
## 2024-02-08 NOTE — PROGRESS NOTES
"Assessment/Plan:  - Diflucan sent to pharmacy, take one pill every 3 days (72 hours) for 3 doses  - Await Sureswab results  - Discussed use of coconut oil during intercourse, if patient is still having uncomfortable intercourse following trial of coconut oil, we will further investigate.   - Discussed use of Dove sensitive skin, unscented bar soap     Diagnoses and all orders for this visit:    Acute vaginitis  -     SURESWAB(R) ADVANCED VAGINITIS PLUS, TMA  -     fluconazole (DIFLUCAN) 150 mg tablet; Take 1 tablet (150 mg total) by mouth every third day for 3 doses    Dyspareunia in female  -     SURESWAB(R) ADVANCED VAGINITIS PLUS, TMA    Screening for STD (sexually transmitted disease)  -     SURESWAB(R) ADVANCED VAGINITIS PLUS, TMA        Subjective:      Patient ID: Pao Vargas is a 24 y.o. female.    Patient is a 25 y/o  F presenting with concern for a yeast infection. She c/o thick, white \"cottage-cheese-like\" discharge and irritation of vulva which has been occurring for a few days. She denies odor.   Patient also notes painful intercourse, feels like friction inside of the vaginal canal. Notes that she is very dry in the vaginal area especially with intercourse and she does not currently use any lubrication. Patient also notes vaginal burning when partner ejaculates inside of her. She would like STD screening tests today.       The following portions of the patient's history were reviewed and updated as appropriate: allergies, current medications, past family history, past medical history, past social history, past surgical history, and problem list.    Review of Systems   Constitutional:  Negative for chills and fever.   Respiratory:  Negative for shortness of breath.    Cardiovascular:  Negative for chest pain.   Gastrointestinal:  Negative for abdominal pain, constipation and diarrhea.   Genitourinary:  Positive for dyspareunia and vaginal discharge. Negative for dysuria, frequency, pelvic " "pain and vaginal pain.        Vulvar irritation   Psychiatric/Behavioral:  Negative for self-injury and suicidal ideas.          Objective:    /80 (BP Location: Left arm, Patient Position: Sitting, Cuff Size: Standard)   Ht 5' 3\" (1.6 m)   Wt (!) 144 kg (318 lb)   LMP 01/19/2024 (Exact Date)   Breastfeeding No   BMI 56.33 kg/m²      Physical Exam  Vitals and nursing note reviewed.   Constitutional:       Appearance: Normal appearance.   HENT:      Head: Normocephalic and atraumatic.   Genitourinary:     General: Normal vulva.      Exam position: Lithotomy position.      Vagina: Vaginal discharge (think, white discharge) present. No tenderness.      Cervix: Normal. No friability, lesion or erythema.   Neurological:      General: No focal deficit present.      Mental Status: She is alert and oriented to person, place, and time.   Psychiatric:         Mood and Affect: Mood normal.         Behavior: Behavior normal.           I have spent a total time of 25 minutes on 02/09/24 in caring for this patient including Risks and benefits of tx options, Instructions for management, Patient and family education, Counseling / Coordination of care, Documenting in the medical record, Reviewing / ordering tests, medicine, procedures  , and Obtaining or reviewing history  .   "

## 2024-02-09 ENCOUNTER — OFFICE VISIT (OUTPATIENT)
Dept: OBGYN CLINIC | Facility: CLINIC | Age: 24
End: 2024-02-09
Payer: MEDICARE

## 2024-02-09 VITALS
HEIGHT: 63 IN | SYSTOLIC BLOOD PRESSURE: 118 MMHG | WEIGHT: 293 LBS | DIASTOLIC BLOOD PRESSURE: 80 MMHG | BODY MASS INDEX: 51.91 KG/M2

## 2024-02-09 DIAGNOSIS — Z11.3 SCREENING FOR STD (SEXUALLY TRANSMITTED DISEASE): ICD-10-CM

## 2024-02-09 DIAGNOSIS — N94.10 DYSPAREUNIA IN FEMALE: ICD-10-CM

## 2024-02-09 DIAGNOSIS — N76.0 ACUTE VAGINITIS: Primary | ICD-10-CM

## 2024-02-09 PROCEDURE — 99213 OFFICE O/P EST LOW 20 MIN: CPT

## 2024-02-09 RX ORDER — FLUCONAZOLE 150 MG/1
150 TABLET ORAL
Qty: 3 TABLET | Refills: 0 | Status: SHIPPED | OUTPATIENT
Start: 2024-02-09 | End: 2024-02-16

## 2024-02-10 LAB
BV BACTERIA RRNA VAG QL NAA+PROBE: POSITIVE
C GLABRATA RNA VAG QL NAA+PROBE: NOT DETECTED
C TRACH RRNA SPEC QL NAA+PROBE: NOT DETECTED
CANDIDA RRNA VAG QL PROBE: DETECTED
N GONORRHOEA RRNA SPEC QL NAA+PROBE: NOT DETECTED
T VAGINALIS RRNA SPEC QL NAA+PROBE: NOT DETECTED

## 2024-02-12 DIAGNOSIS — N76.0 BACTERIAL VAGINOSIS: Primary | ICD-10-CM

## 2024-02-12 DIAGNOSIS — B96.89 BACTERIAL VAGINOSIS: Primary | ICD-10-CM

## 2024-02-12 RX ORDER — METRONIDAZOLE 7.5 MG/G
1 GEL VAGINAL
Qty: 5 G | Refills: 0 | Status: SHIPPED | OUTPATIENT
Start: 2024-02-12 | End: 2024-02-17

## 2024-02-29 ENCOUNTER — TELEPHONE (OUTPATIENT)
Dept: OBGYN CLINIC | Facility: CLINIC | Age: 24
End: 2024-02-29

## 2024-04-12 DIAGNOSIS — E11.65 UNCONTROLLED TYPE 2 DIABETES MELLITUS WITH HYPERGLYCEMIA (HCC): Primary | ICD-10-CM
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
## 2024-07-29 NOTE — TELEPHONE ENCOUNTER
Pt has script already
This medication was refilled on 11/7/19  Was there a problem in the pharmacy receiving the prescription?
To get better and follow your care plan as instructed.
Yes

## 2024-09-13 ENCOUNTER — TELEPHONE (OUTPATIENT)
Age: 24
End: 2024-09-13

## 2024-09-13 NOTE — TELEPHONE ENCOUNTER
Caller: Annette CONWAY    Doctor and/or Office: Bill/Henrietta JIANG#: [phone]     Escalation: Appointment MAN Bryant called and stated she just saw ymron  and she is very concerned abut her toe , she already did a round of Keeflex and the toe is not looking good she said she is a diabetic and she is very concerned.  She asked if here is anyway you can get her in sooner asked if you can reach out to the patient and if she does not answer to leave a detailed message Thank you

## 2024-10-09 DIAGNOSIS — E11.65 UNCONTROLLED TYPE 2 DIABETES MELLITUS WITH HYPERGLYCEMIA (HCC): Primary | ICD-10-CM

## 2024-12-30 ENCOUNTER — TELEPHONE (OUTPATIENT)
Dept: FAMILY MEDICINE CLINIC | Facility: CLINIC | Age: 24
End: 2024-12-30

## 2024-12-30 NOTE — TELEPHONE ENCOUNTER
12/30/24 11:12 AM        The office's request has been received, reviewed, and the patient chart updated. The PCP has successfully been removed with a patient attribution note. This message will now be completed.        Thank you  Khadijah Soares

## (undated) DEVICE — PACK MAJOR ORTHO W/SPLITS PBDS

## (undated) DEVICE — GLOVE SRG BIOGEL ECLIPSE 6.5

## (undated) DEVICE — GLOVE SRG BIOGEL ORTHOPEDIC 7.5

## (undated) DEVICE — D + E SUCTION CANISTER

## (undated) DEVICE — PLUMEPEN PRO 10FT

## (undated) DEVICE — D + E CONNECTION HOSE

## (undated) DEVICE — CULTURE TUBE ANAEROBIC

## (undated) DEVICE — 3000CC GUARDIAN II: Brand: GUARDIAN

## (undated) DEVICE — INTENDED FOR TISSUE SEPARATION, AND OTHER PROCEDURES THAT REQUIRE A SHARP SURGICAL BLADE TO PUNCTURE OR CUT.: Brand: BARD-PARKER SAFETY BLADES SIZE 15, STERILE

## (undated) DEVICE — STERILE SURGICAL LUBRICANT,  TUBE: Brand: SURGILUBE

## (undated) DEVICE — PREMIUM DRY TRAY LF: Brand: MEDLINE INDUSTRIES, INC.

## (undated) DEVICE — GLOVE INDICATOR PI UNDERGLOVE SZ 7 BLUE

## (undated) DEVICE — CURETTE VACURETTE CRVD 7MM

## (undated) DEVICE — CHLORAPREP HI-LITE 26ML ORANGE

## (undated) DEVICE — PVC URETHRAL CATHETER: Brand: DOVER

## (undated) DEVICE — SPONGE LAP 18 X 18 IN STRL RFD

## (undated) DEVICE — CHLORHEXIDINE 4PCT 4 OZ

## (undated) DEVICE — STRL ALLENTOWN HYSTEROSCOPY PK: Brand: CARDINAL HEALTH

## (undated) DEVICE — ABDOMINAL PAD: Brand: DERMACEA

## (undated) DEVICE — COLLECTION SET, DISPOSABLE WITH HANDLE AND TAPERED FITTINGS TUBING, 6 FT (183 CM): Brand: GYRUS ACMI

## (undated) DEVICE — CULTURE TUBE AEROBIC

## (undated) DEVICE — CURITY PLAIN PACKING STRIP: Brand: CURITY

## (undated) DEVICE — D + E SAFE TOUCH TISSUE TRAP (CIRCON)